# Patient Record
Sex: FEMALE | Race: WHITE | NOT HISPANIC OR LATINO | URBAN - METROPOLITAN AREA
[De-identification: names, ages, dates, MRNs, and addresses within clinical notes are randomized per-mention and may not be internally consistent; named-entity substitution may affect disease eponyms.]

---

## 2017-01-23 ENCOUNTER — EMERGENCY (EMERGENCY)
Facility: HOSPITAL | Age: 32
LOS: 1 days | Discharge: HOME | End: 2017-01-23
Attending: EMERGENCY MEDICINE | Admitting: EMERGENCY MEDICINE
Payer: COMMERCIAL

## 2017-01-23 VITALS
SYSTOLIC BLOOD PRESSURE: 115 MMHG | RESPIRATION RATE: 16 BRPM | HEART RATE: 115 BPM | OXYGEN SATURATION: 95 % | TEMPERATURE: 99 F | DIASTOLIC BLOOD PRESSURE: 74 MMHG

## 2017-01-23 VITALS
DIASTOLIC BLOOD PRESSURE: 76 MMHG | TEMPERATURE: 98 F | SYSTOLIC BLOOD PRESSURE: 109 MMHG | RESPIRATION RATE: 17 BRPM | HEART RATE: 102 BPM | OXYGEN SATURATION: 97 %

## 2017-01-23 DIAGNOSIS — Z91.013 ALLERGY TO SEAFOOD: ICD-10-CM

## 2017-01-23 DIAGNOSIS — R55 SYNCOPE AND COLLAPSE: ICD-10-CM

## 2017-01-23 DIAGNOSIS — Z98.890 OTHER SPECIFIED POSTPROCEDURAL STATES: ICD-10-CM

## 2017-01-23 DIAGNOSIS — Z98.89 OTHER SPECIFIED POSTPROCEDURAL STATES: Chronic | ICD-10-CM

## 2017-01-23 DIAGNOSIS — Z88.0 ALLERGY STATUS TO PENICILLIN: ICD-10-CM

## 2017-01-23 DIAGNOSIS — R19.7 DIARRHEA, UNSPECIFIED: ICD-10-CM

## 2017-01-23 DIAGNOSIS — R10.32 LEFT LOWER QUADRANT PAIN: ICD-10-CM

## 2017-01-23 DIAGNOSIS — R11.0 NAUSEA: ICD-10-CM

## 2017-01-23 LAB
ALBUMIN SERPL ELPH-MCNC: 4.4 G/DL — SIGNIFICANT CHANGE UP (ref 3.3–5)
ALP SERPL-CCNC: 72 U/L — SIGNIFICANT CHANGE UP (ref 40–120)
ALT FLD-CCNC: 19 U/L RC — SIGNIFICANT CHANGE UP (ref 10–45)
ANION GAP SERPL CALC-SCNC: 15 MMOL/L — SIGNIFICANT CHANGE UP (ref 5–17)
AST SERPL-CCNC: 21 U/L — SIGNIFICANT CHANGE UP (ref 10–40)
BASOPHILS # BLD AUTO: 0 K/UL — SIGNIFICANT CHANGE UP (ref 0–0.2)
BASOPHILS NFR BLD AUTO: 0.1 % — SIGNIFICANT CHANGE UP (ref 0–2)
BILIRUB SERPL-MCNC: 1.8 MG/DL — HIGH (ref 0.2–1.2)
BUN SERPL-MCNC: 12 MG/DL — SIGNIFICANT CHANGE UP (ref 7–23)
CALCIUM SERPL-MCNC: 9 MG/DL — SIGNIFICANT CHANGE UP (ref 8.4–10.5)
CHLORIDE SERPL-SCNC: 102 MMOL/L — SIGNIFICANT CHANGE UP (ref 96–108)
CO2 SERPL-SCNC: 22 MMOL/L — SIGNIFICANT CHANGE UP (ref 22–31)
CREAT SERPL-MCNC: 0.67 MG/DL — SIGNIFICANT CHANGE UP (ref 0.5–1.3)
EOSINOPHIL # BLD AUTO: 0 K/UL — SIGNIFICANT CHANGE UP (ref 0–0.5)
EOSINOPHIL NFR BLD AUTO: 0.2 % — SIGNIFICANT CHANGE UP (ref 0–6)
GLUCOSE SERPL-MCNC: 117 MG/DL — HIGH (ref 70–99)
HCT VFR BLD CALC: 43 % — SIGNIFICANT CHANGE UP (ref 34.5–45)
HGB BLD-MCNC: 15.1 G/DL — SIGNIFICANT CHANGE UP (ref 11.5–15.5)
LYMPHOCYTES # BLD AUTO: 0.3 K/UL — LOW (ref 1–3.3)
LYMPHOCYTES # BLD AUTO: 3 % — LOW (ref 13–44)
MAGNESIUM SERPL-MCNC: 1.7 MG/DL — SIGNIFICANT CHANGE UP (ref 1.6–2.6)
MCHC RBC-ENTMCNC: 34.1 PG — HIGH (ref 27–34)
MCHC RBC-ENTMCNC: 35 GM/DL — SIGNIFICANT CHANGE UP (ref 32–36)
MCV RBC AUTO: 97.2 FL — SIGNIFICANT CHANGE UP (ref 80–100)
MONOCYTES # BLD AUTO: 0.2 K/UL — SIGNIFICANT CHANGE UP (ref 0–0.9)
MONOCYTES NFR BLD AUTO: 2 % — SIGNIFICANT CHANGE UP (ref 2–14)
NEUTROPHILS # BLD AUTO: 9.7 K/UL — HIGH (ref 1.8–7.4)
NEUTROPHILS NFR BLD AUTO: 94.7 % — HIGH (ref 43–77)
PLAT MORPH BLD: NORMAL — SIGNIFICANT CHANGE UP
PLATELET # BLD AUTO: 163 K/UL — SIGNIFICANT CHANGE UP (ref 150–400)
POTASSIUM SERPL-MCNC: 4.1 MMOL/L — SIGNIFICANT CHANGE UP (ref 3.5–5.3)
POTASSIUM SERPL-SCNC: 4.1 MMOL/L — SIGNIFICANT CHANGE UP (ref 3.5–5.3)
PROT SERPL-MCNC: 7.4 G/DL — SIGNIFICANT CHANGE UP (ref 6–8.3)
RBC # BLD: 4.43 M/UL — SIGNIFICANT CHANGE UP (ref 3.8–5.2)
RBC # FLD: 10.8 % — SIGNIFICANT CHANGE UP (ref 10.3–14.5)
RBC BLD AUTO: SIGNIFICANT CHANGE UP
SODIUM SERPL-SCNC: 139 MMOL/L — SIGNIFICANT CHANGE UP (ref 135–145)
WBC # BLD: 10.2 K/UL — SIGNIFICANT CHANGE UP (ref 3.8–10.5)
WBC # FLD AUTO: 10.2 K/UL — SIGNIFICANT CHANGE UP (ref 3.8–10.5)

## 2017-01-23 PROCEDURE — 99284 EMERGENCY DEPT VISIT MOD MDM: CPT | Mod: 25

## 2017-01-23 PROCEDURE — 80053 COMPREHEN METABOLIC PANEL: CPT

## 2017-01-23 PROCEDURE — 85027 COMPLETE CBC AUTOMATED: CPT

## 2017-01-23 PROCEDURE — 96374 THER/PROPH/DIAG INJ IV PUSH: CPT

## 2017-01-23 PROCEDURE — 93010 ELECTROCARDIOGRAM REPORT: CPT

## 2017-01-23 PROCEDURE — 83735 ASSAY OF MAGNESIUM: CPT

## 2017-01-23 PROCEDURE — 93005 ELECTROCARDIOGRAM TRACING: CPT

## 2017-01-23 RX ORDER — KETOROLAC TROMETHAMINE 30 MG/ML
30 SYRINGE (ML) INJECTION ONCE
Qty: 0 | Refills: 0 | Status: DISCONTINUED | OUTPATIENT
Start: 2017-01-23 | End: 2017-01-23

## 2017-01-23 RX ADMIN — Medication 30 MILLIGRAM(S): at 15:01

## 2017-01-23 RX ADMIN — Medication 30 MILLIGRAM(S): at 15:31

## 2017-01-23 NOTE — ED PROVIDER NOTE - PROGRESS NOTE DETAILS
Study Investigator note: Dereck Strickland MD.  This patient has been evaluated for an investigational randomized placebo controlled study investigating extended release from of ondansetron.  Past  medical history unremarkable. No history of abdominal obstruction. The patient met all inclusion/exclusion criteria for the [facility] ondansetron clinical trial. The study evaluates the use of an ondansetron tablet which provides blood levels for up to 24 hours. The goal of the study is to determine if this tablet can prevent the need for IV hydration and decrease relapse after leaving the ED. If the patient leaves the ED, the patient receives 3 additional tablets to take every 24 hours. The physical exam was unremarkable.  The patients was asked to participate in the trial and the study was explained, including the placebo arm and potential side effects of ondansetron, 60% receive the active drug and 40% receive placebo.  If the patient is unable to tolerate oral rehydration or remains uncomfortable, an IV may be placed and the trial ended and metoclopramide would be given.   The patient was given the consent and opportunity to read and ask questions. Consent was obtained. Dereck Strickland MD (attending) No further vomiting in ED, soft abdomen, patient requesting to leave sooner than planned due to care of 18 month old and on coming storm.  Patient tolerated at least 1500 cc at this time. Informational study packet and medications given to patient Dereck Strickland MD (attending)

## 2017-01-23 NOTE — ED PROVIDER NOTE - MEDICAL DECISION MAKING DETAILS
After careful review of history, physical, and supporting data, there is very low suspicion for an acute abdomen.  The differential includes appendicitis, torsion, perforated viscus, ischemic bowel, obstruction, and infarct. Dereck Strickland MD (attending)

## 2017-01-23 NOTE — ED PROVIDER NOTE - INTERPRETATION
normal sinus rhythm, Normal axis, Normal DC interval and QRS complex. There are no acute ischemic ST or T-wave changes.

## 2017-01-23 NOTE — ED PROVIDER NOTE - ENMT NEGATIVE STATEMENT, MLM
.Nose: no nasal congestion and no nasal drainage.Mouth/Throat: no dysphagia, no hoarseness and no throat pain.Neck: no lumps, no pain, no stiffness and no swollen glands.

## 2017-01-23 NOTE — ED PROVIDER NOTE - PSYCHIATRIC, MLM
Alert and oriented to person, place, time/situation. normal mood and affect. no apparent risk to self or others. Tearful, crying

## 2017-01-23 NOTE — ED ADULT NURSE REASSESSMENT NOTE - NS ED NURSE REASSESS COMMENT FT1
Patient was brought to CDU from Triage. Patient in ED to receive investigational medicine. IV inserted. Toradol 30 mg IV given for pain.

## 2017-08-10 ENCOUNTER — APPOINTMENT (OUTPATIENT)
Dept: FAMILY MEDICINE | Facility: CLINIC | Age: 32
End: 2017-08-10
Payer: COMMERCIAL

## 2017-08-10 VITALS
BODY MASS INDEX: 27.29 KG/M2 | WEIGHT: 139 LBS | RESPIRATION RATE: 14 BRPM | HEART RATE: 73 BPM | TEMPERATURE: 98.2 F | DIASTOLIC BLOOD PRESSURE: 60 MMHG | HEIGHT: 60 IN | SYSTOLIC BLOOD PRESSURE: 100 MMHG | OXYGEN SATURATION: 98 %

## 2017-08-10 PROCEDURE — 99385 PREV VISIT NEW AGE 18-39: CPT

## 2017-08-11 ENCOUNTER — TRANSCRIPTION ENCOUNTER (OUTPATIENT)
Age: 32
End: 2017-08-11

## 2017-10-09 ENCOUNTER — APPOINTMENT (OUTPATIENT)
Dept: FAMILY MEDICINE | Facility: CLINIC | Age: 32
End: 2017-10-09
Payer: COMMERCIAL

## 2017-10-09 VITALS — TEMPERATURE: 98.6 F

## 2017-10-09 PROCEDURE — G0008: CPT

## 2017-10-09 PROCEDURE — 90688 IIV4 VACCINE SPLT 0.5 ML IM: CPT

## 2017-11-24 ENCOUNTER — TRANSCRIPTION ENCOUNTER (OUTPATIENT)
Age: 32
End: 2017-11-24

## 2017-12-06 ENCOUNTER — EMERGENCY (EMERGENCY)
Facility: HOSPITAL | Age: 32
LOS: 1 days | Discharge: HOME | End: 2017-12-06
Attending: EMERGENCY MEDICINE | Admitting: EMERGENCY MEDICINE
Payer: COMMERCIAL

## 2017-12-06 VITALS
OXYGEN SATURATION: 99 % | TEMPERATURE: 98 F | DIASTOLIC BLOOD PRESSURE: 63 MMHG | RESPIRATION RATE: 20 BRPM | HEART RATE: 114 BPM | SYSTOLIC BLOOD PRESSURE: 112 MMHG

## 2017-12-06 VITALS
DIASTOLIC BLOOD PRESSURE: 72 MMHG | SYSTOLIC BLOOD PRESSURE: 107 MMHG | OXYGEN SATURATION: 97 % | HEART RATE: 95 BPM | RESPIRATION RATE: 18 BRPM

## 2017-12-06 DIAGNOSIS — Z98.89 OTHER SPECIFIED POSTPROCEDURAL STATES: Chronic | ICD-10-CM

## 2017-12-06 LAB
ALBUMIN SERPL ELPH-MCNC: 4 G/DL — SIGNIFICANT CHANGE UP (ref 3.3–5)
ALP SERPL-CCNC: 72 U/L — SIGNIFICANT CHANGE UP (ref 40–120)
ALT FLD-CCNC: 19 U/L RC — SIGNIFICANT CHANGE UP (ref 10–45)
ANION GAP SERPL CALC-SCNC: 12 MMOL/L — SIGNIFICANT CHANGE UP (ref 5–17)
APPEARANCE UR: CLEAR — SIGNIFICANT CHANGE UP
AST SERPL-CCNC: 23 U/L — SIGNIFICANT CHANGE UP (ref 10–40)
BACTERIA # UR AUTO: ABNORMAL /HPF
BASOPHILS # BLD AUTO: 0 K/UL — SIGNIFICANT CHANGE UP (ref 0–0.2)
BASOPHILS NFR BLD AUTO: 0.1 % — SIGNIFICANT CHANGE UP (ref 0–2)
BILIRUB SERPL-MCNC: 0.8 MG/DL — SIGNIFICANT CHANGE UP (ref 0.2–1.2)
BILIRUB UR-MCNC: NEGATIVE — SIGNIFICANT CHANGE UP
BUN SERPL-MCNC: 10 MG/DL — SIGNIFICANT CHANGE UP (ref 7–23)
CALCIUM SERPL-MCNC: 9.3 MG/DL — SIGNIFICANT CHANGE UP (ref 8.4–10.5)
CHLORIDE SERPL-SCNC: 102 MMOL/L — SIGNIFICANT CHANGE UP (ref 96–108)
CO2 SERPL-SCNC: 26 MMOL/L — SIGNIFICANT CHANGE UP (ref 22–31)
COLOR SPEC: COLORLESS — SIGNIFICANT CHANGE UP
CREAT SERPL-MCNC: 0.69 MG/DL — SIGNIFICANT CHANGE UP (ref 0.5–1.3)
DIFF PNL FLD: ABNORMAL
EOSINOPHIL # BLD AUTO: 0 K/UL — SIGNIFICANT CHANGE UP (ref 0–0.5)
EOSINOPHIL NFR BLD AUTO: 0.3 % — SIGNIFICANT CHANGE UP (ref 0–6)
EPI CELLS # UR: SIGNIFICANT CHANGE UP /HPF
GLUCOSE SERPL-MCNC: 106 MG/DL — HIGH (ref 70–99)
GLUCOSE UR QL: NEGATIVE — SIGNIFICANT CHANGE UP
HCT VFR BLD CALC: 40.9 % — SIGNIFICANT CHANGE UP (ref 34.5–45)
HGB BLD-MCNC: 14.6 G/DL — SIGNIFICANT CHANGE UP (ref 11.5–15.5)
KETONES UR-MCNC: NEGATIVE — SIGNIFICANT CHANGE UP
LEUKOCYTE ESTERASE UR-ACNC: NEGATIVE — SIGNIFICANT CHANGE UP
LYMPHOCYTES # BLD AUTO: 0.7 K/UL — LOW (ref 1–3.3)
LYMPHOCYTES # BLD AUTO: 4.9 % — LOW (ref 13–44)
MCHC RBC-ENTMCNC: 35.2 PG — HIGH (ref 27–34)
MCHC RBC-ENTMCNC: 35.6 GM/DL — SIGNIFICANT CHANGE UP (ref 32–36)
MCV RBC AUTO: 99 FL — SIGNIFICANT CHANGE UP (ref 80–100)
MONOCYTES # BLD AUTO: 0.7 K/UL — SIGNIFICANT CHANGE UP (ref 0–0.9)
MONOCYTES NFR BLD AUTO: 4.9 % — SIGNIFICANT CHANGE UP (ref 2–14)
NEUTROPHILS # BLD AUTO: 12.2 K/UL — HIGH (ref 1.8–7.4)
NEUTROPHILS NFR BLD AUTO: 89.8 % — HIGH (ref 43–77)
NITRITE UR-MCNC: NEGATIVE — SIGNIFICANT CHANGE UP
PH UR: 6.5 — SIGNIFICANT CHANGE UP (ref 5–8)
PLATELET # BLD AUTO: 232 K/UL — SIGNIFICANT CHANGE UP (ref 150–400)
POTASSIUM SERPL-MCNC: 4.1 MMOL/L — SIGNIFICANT CHANGE UP (ref 3.5–5.3)
POTASSIUM SERPL-SCNC: 4.1 MMOL/L — SIGNIFICANT CHANGE UP (ref 3.5–5.3)
PROT SERPL-MCNC: 7.3 G/DL — SIGNIFICANT CHANGE UP (ref 6–8.3)
PROT UR-MCNC: NEGATIVE — SIGNIFICANT CHANGE UP
RBC # BLD: 4.13 M/UL — SIGNIFICANT CHANGE UP (ref 3.8–5.2)
RBC # FLD: 10.8 % — SIGNIFICANT CHANGE UP (ref 10.3–14.5)
SODIUM SERPL-SCNC: 140 MMOL/L — SIGNIFICANT CHANGE UP (ref 135–145)
SP GR SPEC: 1.01 — LOW (ref 1.01–1.02)
UROBILINOGEN FLD QL: NEGATIVE — SIGNIFICANT CHANGE UP
WBC # BLD: 13.5 K/UL — HIGH (ref 3.8–10.5)
WBC # FLD AUTO: 13.5 K/UL — HIGH (ref 3.8–10.5)
WBC UR QL: SIGNIFICANT CHANGE UP /HPF (ref 0–5)

## 2017-12-06 PROCEDURE — 99284 EMERGENCY DEPT VISIT MOD MDM: CPT | Mod: 25

## 2017-12-06 PROCEDURE — 99285 EMERGENCY DEPT VISIT HI MDM: CPT | Mod: 25

## 2017-12-06 PROCEDURE — 80053 COMPREHEN METABOLIC PANEL: CPT

## 2017-12-06 PROCEDURE — 76705 ECHO EXAM OF ABDOMEN: CPT | Mod: 26

## 2017-12-06 PROCEDURE — 81001 URINALYSIS AUTO W/SCOPE: CPT

## 2017-12-06 PROCEDURE — 96374 THER/PROPH/DIAG INJ IV PUSH: CPT

## 2017-12-06 PROCEDURE — 76705 ECHO EXAM OF ABDOMEN: CPT

## 2017-12-06 PROCEDURE — 85027 COMPLETE CBC AUTOMATED: CPT

## 2017-12-06 RX ORDER — ONDANSETRON 8 MG/1
1 TABLET, FILM COATED ORAL
Qty: 6 | Refills: 0 | OUTPATIENT
Start: 2017-12-06 | End: 2017-12-08

## 2017-12-06 RX ORDER — ONDANSETRON 8 MG/1
4 TABLET, FILM COATED ORAL ONCE
Qty: 0 | Refills: 0 | Status: COMPLETED | OUTPATIENT
Start: 2017-12-06 | End: 2017-12-06

## 2017-12-06 RX ADMIN — ONDANSETRON 4 MILLIGRAM(S): 8 TABLET, FILM COATED ORAL at 02:38

## 2017-12-06 NOTE — ED PROVIDER NOTE - MEDICAL DECISION MAKING DETAILS
Pt is a 32 yoF with no significant PMH who presents to ED with complaint of new nausea and vomiting, abd discomfort.   Diff diagnosis: gastroenteritis vs food poisoning  - check labs  - zofran  appears euvolemic Pt is a 32 yoF with no significant PMH who presents to ED with complaint of new nausea and vomiting, abd discomfort.   Diff diagnosis: gastroenteritis vs food poisoning vs appendicitis   - check labs - zofran for nausea. appears euvolemic currently   - appendicitis appears less likely given mild tenderness on exam and nontoxic appearance Pt is a 32 yoF with no significant PMH who presents to ED with complaint of new nausea and vomiting, abd discomfort. - check labs - zofran for nausea. appears euvolemic currently   - appendicitis appears less likely given mild tenderness on exam and nontoxic appearance  Attending Quique: 31 y/o female presenting with abdominal discomfort associated with nausea. on exam abd soft. low risk for appendicitis. no free fluid in the pelvis on pocus making ruptured cyst less likely. pt well appearing. initially tachycardic, which resolved without intervention. urine pregnancy negative making ectopic less likely. pt well appearing. close return precautions given. no vaginal discharge or risk factors for std. plan to d/c

## 2017-12-06 NOTE — ED PROCEDURE NOTE - PROCEDURE ADDITIONAL DETAILS
POCUS: Emergency Department Focused Ultrasound performed at patient's bedside.  The complete report will be available in PACS. no evidence of acute choelcystitis. no signficant free fluid in the pelvis

## 2017-12-06 NOTE — ED PROVIDER NOTE - PHYSICAL EXAMINATION
Gen: NAD, A&O x 3, lying in bed conversant  HEENT: PERRL, EOMI, mucous membranes moist, no oropharyngeal exudates  Neck:  supple no lymphadenopathy  Pulmonary: CTAB, no rhonci, wheezes or rales  Cardiac: regular rate and rhythm, normal S1S2, no r/m/g  GI:  soft, nondistended, mild tenderness to palpation  Extremities: warm, well perfused, no peripheral edema  Skin: skin intact, no skin tears or rashes or other lesions  Neuro: A&O x3, moves extremities voluntarily Gen: NAD, A&O x 3, lying in bed conversant  HEENT: PERRL, EOMI, mucous membranes moist, no oropharyngeal exudates  Neck:  supple no lymphadenopathy  Pulmonary: CTAB, no rhonci, wheezes or rales  Cardiac: regular rate and rhythm, normal S1S2, no r/m/g  GI:  soft, nondistended, mild tenderness to palpation  Extremities: warm, well perfused, no peripheral edema  Skin: skin intact, no skin tears or rashes or other lesions  Neuro: A&O x3, moves extremities voluntarily  Attending Lei: Gen: NAD, heent: atrauamtic, eomi, perrla, mmm, op pink, uvula midline, neck; nttp, no nuchal rigidity, chest: nttp, no crepitus, cv: rrr, no murmurs, lungs: ctab, abd: soft, nontender, nondistended, no peritoneal signs, +BS, no guarding, ext: wwp, neg homans, skin: no rash, neuro: awake and alert, following commands, speech clear, sensation and strength intact, no focal deficits

## 2017-12-06 NOTE — ED PROVIDER NOTE - NS ED ROS FT
ROS  Gen:  no fevers,  no chills, + lightheadedness   HEENT: no hearing loss, no pharygnitis, no oral lesions + rhinorrhea  Pulm: no cough, no SOB, no sputum production, no wheezing  Cardiac: no chest pain, no SOB, no orthopnea  GI: + abdominal pain, + N, + V, no diarrhea, no constipation  : no dysuria, no hematuria, no decreased urine output  Skin: no skin lesions  Neuro:  no headache, no focal weakness, no numbness, no syncope

## 2017-12-06 NOTE — ED PROVIDER NOTE - OBJECTIVE STATEMENT
Pt is a 32 yoF with no significant PMH who presents to ED with complaint of new nausea and vomiting, abd discomfort.  Pt was previously at normal state of health this evening. Woke up at midnight with rigors, abdominal pain, nausea. Had normal, nonbloody bowel movement at home, but endorses persistent nausea and nonbloody nonbilious emesis.  Endorses recent rhinorrhea, lightheadedness. Daughter had been ill recently.  Denies recent travel.  Consumed Japanese food earlier this evening, no new foods.

## 2017-12-06 NOTE — ED PROVIDER NOTE - CARE PLAN
Instructions for follow-up, activity and diet:	You were seen in the emergency department for nausea, vomiting and abdominal pain. You had blood work, results are included here.  Please continue to stay hydrated.  If you experience fevers, chills, recurrence of abdominal pain, nausea, vomiting or diarrhea, please notify your primary care doctor and return to the emergency department. Principal Discharge DX:	Abdominal pain  Instructions for follow-up, activity and diet:	You were seen in the emergency department for nausea, vomiting and abdominal pain. You had blood work, results are included here.  Please continue to stay hydrated.  If you experience fevers, chills, recurrence of abdominal pain, nausea, vomiting or diarrhea, please notify your primary care doctor and return to the emergency department.

## 2017-12-06 NOTE — ED PROVIDER NOTE - PROGRESS NOTE DETAILS
Attending Quique: on repeat exam abd soft. d/w pt close return precautions for worsening pain. RLQ discomfort or vomiting. pt comfortable with plan and willl return for worsening

## 2017-12-06 NOTE — ED PROVIDER NOTE - PLAN OF CARE
You were seen in the emergency department for nausea, vomiting and abdominal pain. You had blood work, results are included here.  Please continue to stay hydrated.  If you experience fevers, chills, recurrence of abdominal pain, nausea, vomiting or diarrhea, please notify your primary care doctor and return to the emergency department.

## 2017-12-06 NOTE — ED ADULT NURSE NOTE - OBJECTIVE STATEMENT
woke up from sleeping w chills and nausea w 1 episode of vomiting, denies any blood in the vomiting  c/o LLQ abd pain and discomfort ongoing since last february.  and pressure and cramping in the epigastric, w lightheadedness   denies any chest pain, no shortness of breath

## 2018-05-25 ENCOUNTER — APPOINTMENT (OUTPATIENT)
Dept: FAMILY MEDICINE | Facility: CLINIC | Age: 33
End: 2018-05-25
Payer: COMMERCIAL

## 2018-05-25 VITALS — SYSTOLIC BLOOD PRESSURE: 120 MMHG | TEMPERATURE: 98.2 F | DIASTOLIC BLOOD PRESSURE: 70 MMHG

## 2018-05-25 DIAGNOSIS — J06.9 ACUTE UPPER RESPIRATORY INFECTION, UNSPECIFIED: ICD-10-CM

## 2018-05-25 PROCEDURE — 99213 OFFICE O/P EST LOW 20 MIN: CPT

## 2018-05-25 NOTE — PHYSICAL EXAM
[No Acute Distress] : no acute distress [Normal Oropharynx] : the oropharynx was normal [Supple] : supple [Regular Rhythm] : with a regular rhythm [Normal Anterior Cervical Nodes] : no anterior cervical lymphadenopathy [de-identified] : mild wheeze

## 2018-05-25 NOTE — ASSESSMENT
[FreeTextEntry1] : Pt presents to office for 3 day hx getting worse of sore throat , cough ,congestion and wheezing. Greenish mucus and coughing a lot giving her HA>  No fever. Got worse this early am with HA and cough and  wheezing.  Has asthma triggered by URI. Has pro air inhaler uses infrequently\par \par Pt will start Mucinex-D and Tlenol\par If no improvement in 3-4 days can start Zpack\par Renew Proair

## 2018-05-25 NOTE — HISTORY OF PRESENT ILLNESS
[FreeTextEntry8] : Pt presents to office for 3 day hx getting worse of sore throat , cough ,congestion and wheezing. Greenish mucus and coughing a lot giving her HA>  No fever. Got worse this early am with HA and cough and  wheezing.  Has asthma triggered by URI. Has pro air inhaler uses infrequently

## 2018-05-25 NOTE — REVIEW OF SYSTEMS
[Fatigue] : fatigue [Nasal Discharge] : nasal discharge [Sore Throat] : sore throat [Postnasal Drip] : postnasal drip [Wheezing] : wheezing [Cough] : cough [Negative] : Heme/Lymph

## 2018-06-21 ENCOUNTER — APPOINTMENT (OUTPATIENT)
Dept: FAMILY MEDICINE | Facility: CLINIC | Age: 33
End: 2018-06-21

## 2018-06-22 ENCOUNTER — APPOINTMENT (OUTPATIENT)
Dept: FAMILY MEDICINE | Facility: CLINIC | Age: 33
End: 2018-06-22
Payer: COMMERCIAL

## 2018-06-22 VITALS
DIASTOLIC BLOOD PRESSURE: 74 MMHG | OXYGEN SATURATION: 99 % | HEART RATE: 84 BPM | SYSTOLIC BLOOD PRESSURE: 100 MMHG | TEMPERATURE: 98.8 F | RESPIRATION RATE: 16 BRPM

## 2018-06-22 PROCEDURE — 99213 OFFICE O/P EST LOW 20 MIN: CPT

## 2018-06-22 RX ORDER — AZITHROMYCIN DIHYDRATE 250 MG/1
250 TABLET, FILM COATED ORAL
Qty: 1 | Refills: 0 | Status: DISCONTINUED | COMMUNITY
Start: 2018-05-25 | End: 2018-06-22

## 2018-06-22 RX ORDER — TOBRAMYCIN AND DEXAMETHASONE 3; 1 MG/ML; MG/ML
0.3-0.1 SUSPENSION/ DROPS OPHTHALMIC
Qty: 5 | Refills: 0 | Status: DISCONTINUED | COMMUNITY
Start: 2017-12-21

## 2018-06-22 RX ORDER — CLINDAMYCIN PHOSPHATE 1 G/10ML
1 GEL TOPICAL
Qty: 60 | Refills: 0 | Status: DISCONTINUED | COMMUNITY
Start: 2017-10-23

## 2018-06-22 RX ORDER — NYSTATIN 100000 U/G
100000 OINTMENT TOPICAL
Qty: 15 | Refills: 0 | Status: DISCONTINUED | COMMUNITY
Start: 2018-04-23

## 2018-06-22 RX ORDER — TRIAMCINOLONE ACETONIDE 1 MG/G
0.1 CREAM TOPICAL
Qty: 15 | Refills: 0 | Status: DISCONTINUED | COMMUNITY
Start: 2018-04-23

## 2018-06-28 ENCOUNTER — APPOINTMENT (OUTPATIENT)
Dept: CARDIOLOGY | Facility: CLINIC | Age: 33
End: 2018-06-28
Payer: COMMERCIAL

## 2018-06-28 VITALS
SYSTOLIC BLOOD PRESSURE: 120 MMHG | WEIGHT: 138 LBS | BODY MASS INDEX: 27.09 KG/M2 | HEIGHT: 60 IN | HEART RATE: 76 BPM | DIASTOLIC BLOOD PRESSURE: 68 MMHG

## 2018-06-28 PROCEDURE — 93000 ELECTROCARDIOGRAM COMPLETE: CPT

## 2018-06-28 PROCEDURE — 99204 OFFICE O/P NEW MOD 45 MIN: CPT

## 2018-07-23 ENCOUNTER — APPOINTMENT (OUTPATIENT)
Dept: FAMILY MEDICINE | Facility: CLINIC | Age: 33
End: 2018-07-23
Payer: COMMERCIAL

## 2018-07-23 VITALS
TEMPERATURE: 98.6 F | HEART RATE: 75 BPM | RESPIRATION RATE: 16 BRPM | DIASTOLIC BLOOD PRESSURE: 62 MMHG | BODY MASS INDEX: 27.09 KG/M2 | HEIGHT: 60 IN | SYSTOLIC BLOOD PRESSURE: 98 MMHG | WEIGHT: 138 LBS | OXYGEN SATURATION: 98 %

## 2018-07-23 PROCEDURE — 99395 PREV VISIT EST AGE 18-39: CPT

## 2018-07-26 ENCOUNTER — APPOINTMENT (OUTPATIENT)
Dept: CARDIOLOGY | Facility: CLINIC | Age: 33
End: 2018-07-26
Payer: COMMERCIAL

## 2018-07-26 PROCEDURE — 93306 TTE W/DOPPLER COMPLETE: CPT

## 2018-07-30 ENCOUNTER — NON-APPOINTMENT (OUTPATIENT)
Age: 33
End: 2018-07-30

## 2018-08-09 ENCOUNTER — APPOINTMENT (OUTPATIENT)
Dept: CARDIOLOGY | Facility: CLINIC | Age: 33
End: 2018-08-09
Payer: COMMERCIAL

## 2018-08-09 VITALS
SYSTOLIC BLOOD PRESSURE: 115 MMHG | HEART RATE: 92 BPM | WEIGHT: 138 LBS | DIASTOLIC BLOOD PRESSURE: 74 MMHG | BODY MASS INDEX: 26.95 KG/M2 | OXYGEN SATURATION: 93 %

## 2018-08-09 PROCEDURE — 93306 TTE W/DOPPLER COMPLETE: CPT

## 2018-08-09 PROCEDURE — 99215 OFFICE O/P EST HI 40 MIN: CPT

## 2018-08-16 ENCOUNTER — APPOINTMENT (OUTPATIENT)
Dept: MRI IMAGING | Facility: CLINIC | Age: 33
End: 2018-08-16
Payer: COMMERCIAL

## 2018-08-16 ENCOUNTER — OUTPATIENT (OUTPATIENT)
Dept: OUTPATIENT SERVICES | Facility: HOSPITAL | Age: 33
LOS: 1 days | End: 2018-08-16
Payer: COMMERCIAL

## 2018-08-16 DIAGNOSIS — Z98.89 OTHER SPECIFIED POSTPROCEDURAL STATES: Chronic | ICD-10-CM

## 2018-08-16 DIAGNOSIS — Z00.8 ENCOUNTER FOR OTHER GENERAL EXAMINATION: ICD-10-CM

## 2018-08-16 PROCEDURE — A9585: CPT

## 2018-08-16 PROCEDURE — 75561 CARDIAC MRI FOR MORPH W/DYE: CPT | Mod: 26

## 2018-08-16 PROCEDURE — 75561 CARDIAC MRI FOR MORPH W/DYE: CPT

## 2018-08-17 ENCOUNTER — NON-APPOINTMENT (OUTPATIENT)
Age: 33
End: 2018-08-17

## 2018-08-23 ENCOUNTER — NON-APPOINTMENT (OUTPATIENT)
Age: 33
End: 2018-08-23

## 2018-08-23 ENCOUNTER — APPOINTMENT (OUTPATIENT)
Dept: ELECTROPHYSIOLOGY | Facility: CLINIC | Age: 33
End: 2018-08-23
Payer: COMMERCIAL

## 2018-08-23 VITALS — DIASTOLIC BLOOD PRESSURE: 71 MMHG | OXYGEN SATURATION: 98 % | HEART RATE: 72 BPM | SYSTOLIC BLOOD PRESSURE: 103 MMHG

## 2018-08-23 PROCEDURE — 93000 ELECTROCARDIOGRAM COMPLETE: CPT

## 2018-08-23 PROCEDURE — 99205 OFFICE O/P NEW HI 60 MIN: CPT

## 2018-09-17 ENCOUNTER — APPOINTMENT (OUTPATIENT)
Dept: FAMILY MEDICINE | Facility: CLINIC | Age: 33
End: 2018-09-17
Payer: COMMERCIAL

## 2018-09-17 VITALS — TEMPERATURE: 98.3 F | DIASTOLIC BLOOD PRESSURE: 72 MMHG | SYSTOLIC BLOOD PRESSURE: 102 MMHG

## 2018-09-17 PROCEDURE — 99213 OFFICE O/P EST LOW 20 MIN: CPT | Mod: 25

## 2018-09-17 RX ADMIN — TRIAMCINOLONE ACETONIDE 0.5 MG/ML: 40 INJECTION, SUSPENSION INTRA-ARTICULAR; INTRAMUSCULAR at 00:00

## 2018-09-18 RX ORDER — TRIAMCINOLONE ACETONIDE 40 MG/ML
40 SUSPENSION INTRA-ARTERIAL; INTRAMUSCULAR
Qty: 1 | Refills: 0 | Status: COMPLETED | OUTPATIENT
Start: 2018-09-17

## 2018-10-14 ENCOUNTER — TRANSCRIPTION ENCOUNTER (OUTPATIENT)
Age: 33
End: 2018-10-14

## 2018-10-24 ENCOUNTER — APPOINTMENT (OUTPATIENT)
Dept: FAMILY MEDICINE | Facility: CLINIC | Age: 33
End: 2018-10-24
Payer: COMMERCIAL

## 2018-10-24 VITALS — TEMPERATURE: 98.5 F | SYSTOLIC BLOOD PRESSURE: 128 MMHG | DIASTOLIC BLOOD PRESSURE: 80 MMHG

## 2018-10-24 DIAGNOSIS — Z87.09 PERSONAL HISTORY OF OTHER DISEASES OF THE RESPIRATORY SYSTEM: ICD-10-CM

## 2018-10-24 LAB — S PYO AG SPEC QL IA: NEGATIVE

## 2018-10-24 PROCEDURE — 99213 OFFICE O/P EST LOW 20 MIN: CPT | Mod: 25

## 2018-10-24 PROCEDURE — 87880 STREP A ASSAY W/OPTIC: CPT | Mod: QW

## 2018-10-29 LAB — BACTERIA THROAT CULT: NORMAL

## 2018-12-17 ENCOUNTER — TRANSCRIPTION ENCOUNTER (OUTPATIENT)
Age: 33
End: 2018-12-17

## 2018-12-22 ENCOUNTER — APPOINTMENT (OUTPATIENT)
Dept: ORTHOPEDIC SURGERY | Facility: CLINIC | Age: 33
End: 2018-12-22
Payer: COMMERCIAL

## 2018-12-22 VITALS
HEART RATE: 84 BPM | SYSTOLIC BLOOD PRESSURE: 114 MMHG | HEIGHT: 60 IN | DIASTOLIC BLOOD PRESSURE: 77 MMHG | WEIGHT: 144 LBS | BODY MASS INDEX: 28.27 KG/M2

## 2018-12-22 DIAGNOSIS — Z78.9 OTHER SPECIFIED HEALTH STATUS: ICD-10-CM

## 2018-12-22 PROCEDURE — 99202 OFFICE O/P NEW SF 15 MIN: CPT

## 2019-01-02 ENCOUNTER — APPOINTMENT (OUTPATIENT)
Dept: FAMILY MEDICINE | Facility: CLINIC | Age: 34
End: 2019-01-02
Payer: COMMERCIAL

## 2019-01-02 VITALS — SYSTOLIC BLOOD PRESSURE: 110 MMHG | TEMPERATURE: 98 F | DIASTOLIC BLOOD PRESSURE: 64 MMHG

## 2019-01-02 PROCEDURE — 99213 OFFICE O/P EST LOW 20 MIN: CPT

## 2019-01-23 ENCOUNTER — APPOINTMENT (OUTPATIENT)
Dept: ORTHOPEDIC SURGERY | Facility: CLINIC | Age: 34
End: 2019-01-23

## 2019-02-17 ENCOUNTER — TRANSCRIPTION ENCOUNTER (OUTPATIENT)
Age: 34
End: 2019-02-17

## 2019-02-22 ENCOUNTER — APPOINTMENT (OUTPATIENT)
Dept: PAIN MANAGEMENT | Facility: CLINIC | Age: 34
End: 2019-02-22
Payer: COMMERCIAL

## 2019-02-22 VITALS
HEIGHT: 60 IN | HEART RATE: 68 BPM | DIASTOLIC BLOOD PRESSURE: 77 MMHG | WEIGHT: 141 LBS | BODY MASS INDEX: 27.68 KG/M2 | SYSTOLIC BLOOD PRESSURE: 115 MMHG

## 2019-02-22 PROCEDURE — 99204 OFFICE O/P NEW MOD 45 MIN: CPT

## 2019-02-22 NOTE — HISTORY OF PRESENT ILLNESS
[FreeTextEntry1] : Pt notes that her first migraine was 2007 following college where she was nauseated, vomiting, likely hypotensive and then had LOC.  Has tendency to vomit and then will feel better  however, as this progressed the headaches were less frequent and further apart but not as easily responsible.  Did start to increase again following birth of her daughter.  In last 7 months has variability in her frequency.\par She did start to develop other associated features in last 6 months including icepick events with head turning.\par Now her pressure runs on the low side of normal and drop in bp very rapidly will lead her to pass out.\par On New Year's Renea she had an event where she had a severe migraine where she fainted, fell and hit her head on left.  Was examined 1/2 by primary and recommended to see a neurologist.  (see Dr. Leonard's note from 1/2)  \par She generally uses excedrin successfully.  However, despite her known triggers \par She does note some pulsatile tinnitus in her left ear, some recent visual changes in correction.\par Has had cardiac monitoring for episode of palpitation and noted to have "triplet" but no other cardiac symptoms.\par \par \par  [Headache] : headache [Aura] : aura [Nausea] : nausea [Photophobia] : photophobia [Phonophobia] : phonophobia [Scalp Tenderness] : scalp tenderness [___ Times Per Month] : [unfilled] times per month [> 4 hours] : > 4 hours [stayed the same] : stayed the same [4] : a minimum pain level of 4/10 [9] : a maximum pain level of 9/10 [Stable] : The patient reports ~his/her~ symptoms since the last visit are stable

## 2019-02-22 NOTE — PHYSICAL EXAM
[General Appearance - Alert] : alert [General Appearance - In No Acute Distress] : in no acute distress [General Appearance - Well Nourished] : well nourished [General Appearance - Well Developed] : well developed [General Appearance - Well-Appearing] : healthy appearing [Oriented To Time, Place, And Person] : oriented to person, place, and time [Impaired Insight] : insight and judgment were intact [Affect] : the affect was normal [Mood] : the mood was normal [Memory Recent] : recent memory was not impaired [Memory Remote] : remote memory was not impaired [Person] : oriented to person [Place] : oriented to place [Time] : oriented to time [Short Term Intact] : short term memory intact [Remote Intact] : remote memory intact [Registration Intact] : recent registration memory intact [Concentration Intact] : normal concentrating ability [Visual Intact] : visual attention was ~T not ~L decreased [Naming Objects] : no difficulty naming common objects [Writing A Sentence] : no difficulty writing a sentence [Fluency] : fluency intact [Comprehension] : comprehension intact [Reading] : reading intact [Current Events] : adequate knowledge of current events [Past History] : adequate knowledge of personal past history [Vocabulary] : adequate range of vocabulary [Cranial Nerves Oculomotor (III)] : extraocular motion intact [Cranial Nerves Trigeminal (V)] : facial sensation intact symmetrically [Cranial Nerves Facial (VII)] : face symmetrical [Cranial Nerves Vestibulocochlear (VIII)] : hearing was intact bilaterally [Cranial Nerves Accessory (XI - Cranial And Spinal)] : head turning and shoulder shrug symmetric [Cranial Nerves Hypoglossal (XII)] : there was no tongue deviation with protrusion [Motor Strength] : muscle strength was normal in all four extremities [No Muscle Atrophy] : normal bulk in all four extremities [Motor Handedness Right-Handed] : the patient is right hand dominant [Motor Strength Upper Extremities Bilaterally] : strength was normal in both upper extremities [Motor Strength Lower Extremities Bilaterally] : strength was normal in both lower extremities [Sensation Tactile Decrease] : light touch was intact [Allodynia] : no ~T allodynia present [Balance] : balance was intact [Limited Balance] : balance was intact [Past-pointing] : there was no past-pointing [Tremor] : no tremor present [Dysdiadochokinesia Bilaterally] : not present [Coordination - Dysmetria Impaired Finger-to-Nose Bilateral] : not present [2+] : Patella left 2+ [Sclera] : the sclera and conjunctiva were normal [PERRL With Normal Accommodation] : pupils were equal in size, round, reactive to light, with normal accommodation [Extraocular Movements] : extraocular movements were intact [Outer Ear] : the ears and nose were normal in appearance [FreeTextEntry1] : moderate decrease rom in both paraspinals [Exaggerated Use Of Accessory Muscles For Inspiration] : no accessory muscle use [Edema] : there was no peripheral edema [Abdomen Tenderness] : non-tender [No CVA Tenderness] : no ~M costovertebral angle tenderness [No Spinal Tenderness] : no spinal tenderness [Abnormal Walk] : normal gait [Nail Clubbing] : no clubbing  or cyanosis of the fingernails [Involuntary Movements] : no involuntary movements were seen [Musculoskeletal - Swelling] : no joint swelling seen [Motor Tone] : muscle strength and tone were normal [Skin Color & Pigmentation] : normal skin color and pigmentation [Skin Turgor] : normal skin turgor [] : no rash

## 2019-02-22 NOTE — REVIEW OF SYSTEMS
[Fever] : no fever [Chills] : no chills [Feeling Poorly] : feeling poorly [Feeling Tired] : feeling tired [Eyesight Problems] : no eyesight problems [Loss Of Hearing] : no hearing loss [Chest Pain] : no chest pain [Palpitations] : no palpitations [Cough] : no cough [Constipation] : no constipation [Diarrhea] : no diarrhea [Arthralgias] : arthralgias [Neck Pain] : neck pain [Skin Lesions] : no skin lesions [Skin Wound] : no skin wound [Itching] : no itching [As Noted in HPI] : as noted in HPI [Dizziness] : dizziness [Fainting] : fainting [Sleep Disturbances] : sleep disturbances [Muscle Weakness] : no muscle weakness [Swollen Glands] : no swollen glands [Swollen Glands In The Neck] : no swollen glands in the neck

## 2019-02-28 ENCOUNTER — APPOINTMENT (OUTPATIENT)
Dept: ELECTROPHYSIOLOGY | Facility: CLINIC | Age: 34
End: 2019-02-28
Payer: COMMERCIAL

## 2019-02-28 ENCOUNTER — NON-APPOINTMENT (OUTPATIENT)
Age: 34
End: 2019-02-28

## 2019-02-28 ENCOUNTER — MESSAGE (OUTPATIENT)
Age: 34
End: 2019-02-28

## 2019-02-28 VITALS
BODY MASS INDEX: 27.88 KG/M2 | OXYGEN SATURATION: 99 % | HEART RATE: 69 BPM | DIASTOLIC BLOOD PRESSURE: 71 MMHG | SYSTOLIC BLOOD PRESSURE: 110 MMHG | HEIGHT: 60 IN | WEIGHT: 142 LBS

## 2019-02-28 PROCEDURE — 99213 OFFICE O/P EST LOW 20 MIN: CPT

## 2019-02-28 PROCEDURE — 93000 ELECTROCARDIOGRAM COMPLETE: CPT

## 2019-02-28 RX ORDER — BENZONATATE 200 MG/1
200 CAPSULE ORAL 3 TIMES DAILY
Qty: 30 | Refills: 0 | Status: DISCONTINUED | COMMUNITY
Start: 2018-10-24 | End: 2019-02-28

## 2019-02-28 RX ORDER — ONDANSETRON 4 MG/1
4 TABLET, ORALLY DISINTEGRATING ORAL
Qty: 30 | Refills: 2 | Status: DISCONTINUED | COMMUNITY
Start: 2019-02-22 | End: 2019-02-28

## 2019-02-28 NOTE — HISTORY OF PRESENT ILLNESS
[FreeTextEntry1] : No further episodes.  She has rare palpitations that over the past few weeks/months have withered away, ie happen less often, less intense, and shorter.  She is eating and hydrating better, although she has coffee with her this AM.

## 2019-02-28 NOTE — PHYSICAL EXAM
[General Appearance - Well Developed] : well developed [Normal Appearance] : normal appearance [Well Groomed] : well groomed [General Appearance - Well Nourished] : well nourished [No Deformities] : no deformities [General Appearance - In No Acute Distress] : no acute distress [Normal Conjunctiva] : the conjunctiva exhibited no abnormalities [Eyelids - No Xanthelasma] : the eyelids demonstrated no xanthelasmas [Normal Oral Mucosa] : normal oral mucosa [No Oral Pallor] : no oral pallor [No Oral Cyanosis] : no oral cyanosis [Normal Jugular Venous A Waves Present] : normal jugular venous A waves present [Normal Jugular Venous V Waves Present] : normal jugular venous V waves present [No Jugular Venous Fragoso A Waves] : no jugular venous fragoso A waves [Respiration, Rhythm And Depth] : normal respiratory rhythm and effort [Exaggerated Use Of Accessory Muscles For Inspiration] : no accessory muscle use [Auscultation Breath Sounds / Voice Sounds] : lungs were clear to auscultation bilaterally [Heart Rate And Rhythm] : heart rate and rhythm were normal [Heart Sounds] : normal S1 and S2 [Murmurs] : no murmurs present [Abdomen Soft] : soft [Abdomen Tenderness] : non-tender [Abdomen Mass (___ Cm)] : no abdominal mass palpated [Abnormal Walk] : normal gait [Gait - Sufficient For Exercise Testing] : the gait was sufficient for exercise testing [Nail Clubbing] : no clubbing of the fingernails [Cyanosis, Localized] : no localized cyanosis [Petechial Hemorrhages (___cm)] : no petechial hemorrhages [Skin Color & Pigmentation] : normal skin color and pigmentation [] : no rash [No Venous Stasis] : no venous stasis [Skin Lesions] : no skin lesions [No Skin Ulcers] : no skin ulcer [No Xanthoma] : no  xanthoma was observed

## 2019-03-01 ENCOUNTER — TRANSCRIPTION ENCOUNTER (OUTPATIENT)
Age: 34
End: 2019-03-01

## 2019-03-05 ENCOUNTER — MESSAGE (OUTPATIENT)
Age: 34
End: 2019-03-05

## 2019-03-06 ENCOUNTER — APPOINTMENT (OUTPATIENT)
Dept: NEUROSURGERY | Facility: CLINIC | Age: 34
End: 2019-03-06
Payer: COMMERCIAL

## 2019-03-06 PROCEDURE — 99204 OFFICE O/P NEW MOD 45 MIN: CPT

## 2019-03-07 ENCOUNTER — APPOINTMENT (OUTPATIENT)
Dept: SURGERY | Facility: CLINIC | Age: 34
End: 2019-03-07

## 2019-03-07 ENCOUNTER — FORM ENCOUNTER (OUTPATIENT)
Age: 34
End: 2019-03-07

## 2019-03-08 ENCOUNTER — APPOINTMENT (OUTPATIENT)
Dept: MRI IMAGING | Facility: IMAGING CENTER | Age: 34
End: 2019-03-08
Payer: COMMERCIAL

## 2019-03-08 ENCOUNTER — OUTPATIENT (OUTPATIENT)
Dept: OUTPATIENT SERVICES | Facility: HOSPITAL | Age: 34
LOS: 1 days | End: 2019-03-08
Payer: COMMERCIAL

## 2019-03-08 ENCOUNTER — APPOINTMENT (OUTPATIENT)
Dept: PAIN MANAGEMENT | Facility: CLINIC | Age: 34
End: 2019-03-08

## 2019-03-08 ENCOUNTER — TRANSCRIPTION ENCOUNTER (OUTPATIENT)
Age: 34
End: 2019-03-08

## 2019-03-08 VITALS
DIASTOLIC BLOOD PRESSURE: 74 MMHG | RESPIRATION RATE: 12 BRPM | OXYGEN SATURATION: 100 % | HEART RATE: 74 BPM | TEMPERATURE: 98 F | SYSTOLIC BLOOD PRESSURE: 109 MMHG | HEIGHT: 60 IN | WEIGHT: 136.03 LBS

## 2019-03-08 DIAGNOSIS — Z98.89 OTHER SPECIFIED POSTPROCEDURAL STATES: Chronic | ICD-10-CM

## 2019-03-08 DIAGNOSIS — D32.9 BENIGN NEOPLASM OF MENINGES, UNSPECIFIED: ICD-10-CM

## 2019-03-08 DIAGNOSIS — R00.2 PALPITATIONS: ICD-10-CM

## 2019-03-08 DIAGNOSIS — Z29.9 ENCOUNTER FOR PROPHYLACTIC MEASURES, UNSPECIFIED: ICD-10-CM

## 2019-03-08 DIAGNOSIS — F41.9 ANXIETY DISORDER, UNSPECIFIED: ICD-10-CM

## 2019-03-08 DIAGNOSIS — Z01.818 ENCOUNTER FOR OTHER PREPROCEDURAL EXAMINATION: ICD-10-CM

## 2019-03-08 LAB
ANION GAP SERPL CALC-SCNC: 20 MMOL/L — HIGH (ref 5–17)
BLD GP AB SCN SERPL QL: NEGATIVE — SIGNIFICANT CHANGE UP
BUN SERPL-MCNC: 12 MG/DL — SIGNIFICANT CHANGE UP (ref 7–23)
CALCIUM SERPL-MCNC: 10 MG/DL — SIGNIFICANT CHANGE UP (ref 8.4–10.5)
CHLORIDE SERPL-SCNC: 101 MMOL/L — SIGNIFICANT CHANGE UP (ref 96–108)
CO2 SERPL-SCNC: 20 MMOL/L — LOW (ref 22–31)
CREAT SERPL-MCNC: 0.63 MG/DL — SIGNIFICANT CHANGE UP (ref 0.5–1.3)
GLUCOSE SERPL-MCNC: 117 MG/DL — HIGH (ref 70–99)
HCG SERPL-ACNC: <2 MIU/ML — SIGNIFICANT CHANGE UP
HCT VFR BLD CALC: 42.6 % — SIGNIFICANT CHANGE UP (ref 34.5–45)
HGB BLD-MCNC: 14.3 G/DL — SIGNIFICANT CHANGE UP (ref 11.5–15.5)
MCHC RBC-ENTMCNC: 33.1 PG — SIGNIFICANT CHANGE UP (ref 27–34)
MCHC RBC-ENTMCNC: 33.6 GM/DL — SIGNIFICANT CHANGE UP (ref 32–36)
MCV RBC AUTO: 98.6 FL — SIGNIFICANT CHANGE UP (ref 80–100)
MRSA PCR RESULT.: SIGNIFICANT CHANGE UP
PLATELET # BLD AUTO: 220 K/UL — SIGNIFICANT CHANGE UP (ref 150–400)
POTASSIUM SERPL-MCNC: 3.8 MMOL/L — SIGNIFICANT CHANGE UP (ref 3.5–5.3)
POTASSIUM SERPL-SCNC: 3.8 MMOL/L — SIGNIFICANT CHANGE UP (ref 3.5–5.3)
RBC # BLD: 4.32 M/UL — SIGNIFICANT CHANGE UP (ref 3.8–5.2)
RBC # FLD: 11.8 % — SIGNIFICANT CHANGE UP (ref 10.3–14.5)
RH IG SCN BLD-IMP: POSITIVE — SIGNIFICANT CHANGE UP
S AUREUS DNA NOSE QL NAA+PROBE: DETECTED
SODIUM SERPL-SCNC: 141 MMOL/L — SIGNIFICANT CHANGE UP (ref 135–145)
WBC # BLD: 8.84 K/UL — SIGNIFICANT CHANGE UP (ref 3.8–10.5)
WBC # FLD AUTO: 8.84 K/UL — SIGNIFICANT CHANGE UP (ref 3.8–10.5)

## 2019-03-08 PROCEDURE — 86850 RBC ANTIBODY SCREEN: CPT

## 2019-03-08 PROCEDURE — 80048 BASIC METABOLIC PNL TOTAL CA: CPT

## 2019-03-08 PROCEDURE — 70553 MRI BRAIN STEM W/O & W/DYE: CPT | Mod: 26

## 2019-03-08 PROCEDURE — 85027 COMPLETE CBC AUTOMATED: CPT

## 2019-03-08 PROCEDURE — 87640 STAPH A DNA AMP PROBE: CPT

## 2019-03-08 PROCEDURE — 70553 MRI BRAIN STEM W/O & W/DYE: CPT

## 2019-03-08 PROCEDURE — A9585: CPT

## 2019-03-08 PROCEDURE — 87641 MR-STAPH DNA AMP PROBE: CPT

## 2019-03-08 PROCEDURE — G0463: CPT

## 2019-03-08 PROCEDURE — 84702 CHORIONIC GONADOTROPIN TEST: CPT

## 2019-03-08 PROCEDURE — 86901 BLOOD TYPING SEROLOGIC RH(D): CPT

## 2019-03-08 PROCEDURE — 86900 BLOOD TYPING SEROLOGIC ABO: CPT

## 2019-03-08 RX ORDER — VANCOMYCIN HCL 1 G
1000 VIAL (EA) INTRAVENOUS ONCE
Qty: 0 | Refills: 0 | Status: DISCONTINUED | OUTPATIENT
Start: 2019-03-09 | End: 2019-03-11

## 2019-03-08 RX ORDER — LIDOCAINE HCL 20 MG/ML
0.2 VIAL (ML) INJECTION ONCE
Qty: 0 | Refills: 0 | Status: DISCONTINUED | OUTPATIENT
Start: 2019-03-09 | End: 2019-03-09

## 2019-03-08 RX ORDER — SODIUM CHLORIDE 9 MG/ML
3 INJECTION INTRAMUSCULAR; INTRAVENOUS; SUBCUTANEOUS EVERY 8 HOURS
Qty: 0 | Refills: 0 | Status: DISCONTINUED | OUTPATIENT
Start: 2019-03-09 | End: 2019-03-09

## 2019-03-08 NOTE — H&P PST ADULT - ASSESSMENT
CAPRINI SCORE [CLOT]    AGE RELATED RISK FACTORS                                                       MOBILITY RELATED FACTORS  [ ] Age 41-60 years                                            (1 Point)                  [ ] Bed rest                                                        (1 Point)  [ ] Age: 61-74 years                                           (2 Points)                 [ ] Plaster cast                                                   (2 Points)  [ ] Age= 75 years                                              (3 Points)                 [ ] Bed bound for more than 72 hours                 (2 Points)    DISEASE RELATED RISK FACTORS                                               GENDER SPECIFIC FACTORS  [ ] Edema in the lower extremities                       (1 Point)                  [ ] Pregnancy                                                     (1 Point)  [ ] Varicose veins                                               (1 Point)                  [ ] Post-partum < 6 weeks                                   (1 Point)             [x ] BMI > 25 Kg/m2                                            (1 Point)                  [ ] Hormonal therapy  or oral contraception          (1 Point)                 [ ] Sepsis (in the previous month)                        (1 Point)                  [ ] History of pregnancy complications                 (1 point)  [ ] Pneumonia or serious lung disease                                               [ ] Unexplained or recurrent                     (1 Point)           (in the previous month)                               (1 Point)  [ ] Abnormal pulmonary function test                     (1 Point)                 SURGERY RELATED RISK FACTORS  [ ] Acute myocardial infarction                              (1 Point)                 [ ]  Section                                             (1 Point)  [ ] Congestive heart failure (in the previous month)  (1 Point)               [ ] Minor surgery                                                  (1 Point)   [ ] Inflammatory bowel disease                             (1 Point)                 [ ] Arthroscopic surgery                                        (2 Points)  [ ] Central venous access                                      (2 Points)                [x ] General surgery lasting more than 45 minutes   (2 Points)       [ ] Stroke (in the previous month)                          (5 Points)               [ ] Elective arthroplasty                                         (5 Points)                                                                                                                                               HEMATOLOGY RELATED FACTORS                                                 TRAUMA RELATED RISK FACTORS  [ ] Prior episodes of VTE                                     (3 Points)                 [ ] Fracture of the hip, pelvis, or leg                       (5 Points)  [ ] Positive family history for VTE                         (3 Points)                 [ ] Acute spinal cord injury (in the previous month)  (5 Points)  [ ] Prothrombin 89748 A                                     (3 Points)                 [ ] Paralysis  (less than 1 month)                             (5 Points)  [ ] Factor V Leiden                                             (3 Points)                  [ ] Multiple Trauma within 1 month                        (5 Points)  [ ] Lupus anticoagulants                                     (3 Points)                                                           [ ] Anticardiolipin antibodies                               (3 Points)                                                       [ ] High homocysteine in the blood                      (3 Points)                                             [ ] Other congenital or acquired thrombophilia      (3 Points)                                                [ ] Heparin induced thrombocytopenia                  (3 Points)                                          Total Score [    3      ]

## 2019-03-08 NOTE — H&P PST ADULT - ATTENDING COMMENTS
66 year old male POD 3 s/p flex bronch, robotic assisted left VATS, left upper lobe volume reduction, mechanical pleurodesis  -Posterior chest tube removed, follow up x-ray  -Pain control  -OOB and ambulate  -Plan to discharge today vs. 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 Her headache pattern has changed recently and she had an episode of vomiting 3 days ago without associated migraine. Plan: right posterior fossa craniotomy and ventriculostomy placement. I have discussed the risks, benefits, and alternatives to surgery with the patient and answered all questions.

## 2019-03-08 NOTE — H&P PST ADULT - NSANTHOSAYNRD_GEN_A_CORE
No. TESS screening performed.  STOP BANG Legend: 0-2 = LOW Risk; 3-4 = INTERMEDIATE Risk; 5-8 = HIGH Risk

## 2019-03-08 NOTE — H&P PST ADULT - PROBLEM SELECTOR PLAN 2
Instructed to take Xanax as needed including DOS with small sip of water.  She is requesting some medication before being brought into OR due to her high anxiety.

## 2019-03-08 NOTE — H&P PST ADULT - HISTORY OF PRESENT ILLNESS
Mrs. Schaefer is a 33 year old woman with PMH Migraines and anxiety who has been having increasing headaches with vomiting and her neurologist Dr. James ordered an MRI/MRV and diagnosed with meningioma which she is now scheduled for left posterior fossa craniotomy for tumor removal 3/9/19.  Dr. Garner started her on dexamethasone 4mg every 8 hours until surgery and due to increasing anxiety about diagnosis and planned surgery started her on Alprazolam 0.25mg.  She has no c/o headache today, however is extremely anxious about the surgery tomorrow.

## 2019-03-08 NOTE — H&P PST ADULT - PROBLEM SELECTOR PLAN 1
Scheduled for left posterior fossa craniotomy for tumor.  Preop instructions given.  Labs pending including pregnancy test, nasal swab for MRSA

## 2019-03-09 ENCOUNTER — APPOINTMENT (OUTPATIENT)
Dept: NEUROSURGERY | Facility: HOSPITAL | Age: 34
End: 2019-03-09

## 2019-03-09 ENCOUNTER — INPATIENT (INPATIENT)
Facility: HOSPITAL | Age: 34
LOS: 4 days | Discharge: HOME | DRG: 25 | End: 2019-03-14
Attending: NEUROLOGICAL SURGERY | Admitting: NEUROLOGICAL SURGERY
Payer: COMMERCIAL

## 2019-03-09 VITALS
DIASTOLIC BLOOD PRESSURE: 74 MMHG | RESPIRATION RATE: 18 BRPM | WEIGHT: 136.03 LBS | HEART RATE: 76 BPM | OXYGEN SATURATION: 99 % | TEMPERATURE: 98 F | HEIGHT: 60 IN | SYSTOLIC BLOOD PRESSURE: 118 MMHG

## 2019-03-09 DIAGNOSIS — Z98.89 OTHER SPECIFIED POSTPROCEDURAL STATES: Chronic | ICD-10-CM

## 2019-03-09 DIAGNOSIS — D32.9 BENIGN NEOPLASM OF MENINGES, UNSPECIFIED: ICD-10-CM

## 2019-03-09 LAB
GAS PNL BLDA: SIGNIFICANT CHANGE UP
HCG UR QL: NEGATIVE — SIGNIFICANT CHANGE UP

## 2019-03-09 PROCEDURE — 61519 REMOVE BRAIN LINING LESION: CPT | Mod: 22

## 2019-03-09 PROCEDURE — 61120 BURR HOLE FOR VENTR PUNCTURE: CPT

## 2019-03-09 PROCEDURE — 61781 SCAN PROC CRANIAL INTRA: CPT

## 2019-03-09 PROCEDURE — 99291 CRITICAL CARE FIRST HOUR: CPT

## 2019-03-09 RX ORDER — PANTOPRAZOLE SODIUM 20 MG/1
40 TABLET, DELAYED RELEASE ORAL DAILY
Qty: 0 | Refills: 0 | Status: DISCONTINUED | OUTPATIENT
Start: 2019-03-09 | End: 2019-03-11

## 2019-03-09 RX ORDER — HYDROMORPHONE HYDROCHLORIDE 2 MG/ML
0.5 INJECTION INTRAMUSCULAR; INTRAVENOUS; SUBCUTANEOUS
Qty: 0 | Refills: 0 | Status: DISCONTINUED | OUTPATIENT
Start: 2019-03-09 | End: 2019-03-09

## 2019-03-09 RX ORDER — DOCUSATE SODIUM 100 MG
100 CAPSULE ORAL THREE TIMES A DAY
Qty: 0 | Refills: 0 | Status: DISCONTINUED | OUTPATIENT
Start: 2019-03-09 | End: 2019-03-14

## 2019-03-09 RX ORDER — OXYCODONE AND ACETAMINOPHEN 5; 325 MG/1; MG/1
1 TABLET ORAL EVERY 4 HOURS
Qty: 0 | Refills: 0 | Status: DISCONTINUED | OUTPATIENT
Start: 2019-03-09 | End: 2019-03-14

## 2019-03-09 RX ORDER — OXYCODONE AND ACETAMINOPHEN 5; 325 MG/1; MG/1
2 TABLET ORAL EVERY 6 HOURS
Qty: 0 | Refills: 0 | Status: DISCONTINUED | OUTPATIENT
Start: 2019-03-09 | End: 2019-03-14

## 2019-03-09 RX ORDER — METOCLOPRAMIDE HCL 10 MG
10 TABLET ORAL ONCE
Qty: 0 | Refills: 0 | Status: COMPLETED | OUTPATIENT
Start: 2019-03-09 | End: 2019-03-09

## 2019-03-09 RX ORDER — NAFCILLIN 10 G/100ML
1 INJECTION, POWDER, FOR SOLUTION INTRAVENOUS EVERY 4 HOURS
Qty: 0 | Refills: 0 | Status: DISCONTINUED | OUTPATIENT
Start: 2019-03-09 | End: 2019-03-09

## 2019-03-09 RX ORDER — MORPHINE SULFATE 50 MG/1
1 CAPSULE, EXTENDED RELEASE ORAL EVERY 4 HOURS
Qty: 0 | Refills: 0 | Status: DISCONTINUED | OUTPATIENT
Start: 2019-03-09 | End: 2019-03-09

## 2019-03-09 RX ORDER — VANCOMYCIN HCL 1 G
1000 VIAL (EA) INTRAVENOUS DAILY
Qty: 0 | Refills: 0 | Status: COMPLETED | OUTPATIENT
Start: 2019-03-09 | End: 2019-03-10

## 2019-03-09 RX ORDER — ALPRAZOLAM 0.25 MG
0.25 TABLET ORAL EVERY 6 HOURS
Qty: 0 | Refills: 0 | Status: DISCONTINUED | OUTPATIENT
Start: 2019-03-09 | End: 2019-03-10

## 2019-03-09 RX ORDER — DEXAMETHASONE 0.5 MG/5ML
4 ELIXIR ORAL EVERY 6 HOURS
Qty: 0 | Refills: 0 | Status: DISCONTINUED | OUTPATIENT
Start: 2019-03-09 | End: 2019-03-11

## 2019-03-09 RX ORDER — DEXTROSE MONOHYDRATE, SODIUM CHLORIDE, AND POTASSIUM CHLORIDE 50; .745; 4.5 G/1000ML; G/1000ML; G/1000ML
1000 INJECTION, SOLUTION INTRAVENOUS
Qty: 0 | Refills: 0 | Status: DISCONTINUED | OUTPATIENT
Start: 2019-03-09 | End: 2019-03-10

## 2019-03-09 RX ORDER — ONDANSETRON 8 MG/1
4 TABLET, FILM COATED ORAL ONCE
Qty: 0 | Refills: 0 | Status: COMPLETED | OUTPATIENT
Start: 2019-03-09 | End: 2019-03-09

## 2019-03-09 RX ADMIN — HYDROMORPHONE HYDROCHLORIDE 0.5 MILLIGRAM(S): 2 INJECTION INTRAMUSCULAR; INTRAVENOUS; SUBCUTANEOUS at 16:00

## 2019-03-09 RX ADMIN — HYDROMORPHONE HYDROCHLORIDE 0.5 MILLIGRAM(S): 2 INJECTION INTRAMUSCULAR; INTRAVENOUS; SUBCUTANEOUS at 16:25

## 2019-03-09 RX ADMIN — DEXTROSE MONOHYDRATE, SODIUM CHLORIDE, AND POTASSIUM CHLORIDE 85 MILLILITER(S): 50; .745; 4.5 INJECTION, SOLUTION INTRAVENOUS at 18:29

## 2019-03-09 RX ADMIN — Medication 250 MILLIGRAM(S): at 18:18

## 2019-03-09 RX ADMIN — Medication 10 MILLIGRAM(S): at 17:14

## 2019-03-09 RX ADMIN — HYDROMORPHONE HYDROCHLORIDE 0.5 MILLIGRAM(S): 2 INJECTION INTRAMUSCULAR; INTRAVENOUS; SUBCUTANEOUS at 16:50

## 2019-03-09 RX ADMIN — Medication 4 MILLIGRAM(S): at 18:11

## 2019-03-09 RX ADMIN — HYDROMORPHONE HYDROCHLORIDE 0.5 MILLIGRAM(S): 2 INJECTION INTRAMUSCULAR; INTRAVENOUS; SUBCUTANEOUS at 15:30

## 2019-03-09 RX ADMIN — ONDANSETRON 4 MILLIGRAM(S): 8 TABLET, FILM COATED ORAL at 15:10

## 2019-03-09 RX ADMIN — HYDROMORPHONE HYDROCHLORIDE 0.5 MILLIGRAM(S): 2 INJECTION INTRAMUSCULAR; INTRAVENOUS; SUBCUTANEOUS at 18:25

## 2019-03-09 RX ADMIN — Medication 4 MILLIGRAM(S): at 23:00

## 2019-03-09 RX ADMIN — HYDROMORPHONE HYDROCHLORIDE 0.5 MILLIGRAM(S): 2 INJECTION INTRAMUSCULAR; INTRAVENOUS; SUBCUTANEOUS at 15:10

## 2019-03-09 RX ADMIN — HYDROMORPHONE HYDROCHLORIDE 0.5 MILLIGRAM(S): 2 INJECTION INTRAMUSCULAR; INTRAVENOUS; SUBCUTANEOUS at 18:11

## 2019-03-09 RX ADMIN — Medication 0.25 MILLIGRAM(S): at 17:15

## 2019-03-09 RX ADMIN — SODIUM CHLORIDE 3 MILLILITER(S): 9 INJECTION INTRAMUSCULAR; INTRAVENOUS; SUBCUTANEOUS at 06:37

## 2019-03-09 NOTE — PROGRESS NOTE ADULT - SUBJECTIVE AND OBJECTIVE BOX
34yo woman POD#0 L SOC for meningioma resection    Vitals/labs/meds/imaging reviewed  alert, fully oriented, PERRL, EOMI, FS, TML  BUE no drift 5/5, BLE 5/5    CTH in am  decadron for cerebral edema  EVD at [id number] per neurosurgery  pain control  -160  incentive spirometry  advance diet as tolerated  IVL  D/C rapp  monitor for fevers    critical care time 30min

## 2019-03-09 NOTE — PROGRESS NOTE ADULT - SUBJECTIVE AND OBJECTIVE BOX
Chief Complaint/Reason for Visit/HPI:    Reason for Admission:  Reason for Admission	I am having a meningioma removed from my brain     History of Present Illness:  History of Present Illness	  Mrs. Schaefer is a 33 year old woman with PMH Migraines and anxiety who has been having increasing headaches with vomiting and her neurologist Dr. James ordered an MRI/MRV and diagnosed with meningioma which she is now scheduled for left posterior fossa craniotomy for tumor removal 3/9/19.  Dr. Garner started her on dexamethasone 4mg every 8 hours until surgery and due to increasing anxiety about diagnosis and planned surgery started her on Alprazolam 0.25mg.  She has no c/o headache today, however is extremely anxious about the surgery tomorrow.    Allergies/Medications:   Allergies:        Allergies:  	dermabond: Miscellaneous, Hives, Blisters, dermabond  	penicillin: Drug, Unknown, Pt does not know reaction  	shellfish: Food, Unknown, shrimp/crab- pt unsure of reaction found on allergy testing    Home Medications:   * Patient Currently Takes Medications as of 08-Mar-2019 08:01 documented in Structured Notes  · 	Co Q-10 100 mg oral capsule: Last Dose Taken:  , 1 cap(s) orally once a day  · 	magnesium: Last Dose Taken:  , 400 tab(s) orally once a day  · 	ALPRAZolam 0.5 mg oral tablet: Last Dose Taken:  , 0.5 tab(s) orally 3 times a day, As Needed  · 	dexamethasone 4 mg oral tablet: Last Dose Taken:  , 1 tab(s) orally every 8 hours until surgery  · 	Colace 50 mg oral capsule: Last Dose Taken:  , 1 cap(s) orally 2 times a day  · 	famotidine 40 mg oral tablet: Last Dose Taken:  , 1 tab(s) orally once a day (at bedtime)    PMH/PSH/FH/SH:    Past Medical History:  Anxiety  Since dx with meningioma  Migraine without aura and without status migrainosus, not intractable    Mild intermittent asthma without complication  only when ill with cold  Palpitations  Work up done by Dr. Noel 2018  Syncope, unspecified syncope type  Only from intense vomiting from migraine, followed by Bert.     Past Surgical History:  H/O:   .     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  · Lives With	children; spouse     Substance Use History:  · Substance Use	caffeine  · Caffeine Type	coffee  · Caffeine Amount/Frequency	3-4 cups/cans per day  · Caffeine Withdrawal Pattern	na     Alcohol Use History:  · Alcohol Use Comment	once a week, one glass of wine or beer     Tobacco Usage:  · Tobacco Usage: Never smoker      SURGERY: Craniotomy          REVIEW OF SYSTEMS: [ ] Unable to Assess due to neurologic exam   [ ] All ROS addressed below are non-contributory, except:  Neuro: [ ] Headache [ ] Back pain [ ] Numbness [ ] Weakness [ ] Ataxia [ ] Dizziness [ ] Aphasia [ ] Dysarthria [ ] Visual disturbance  Resp: [ ] Shortness of breath/dyspnea, [ ] Orthopnea [ ] Cough  CV: [ ] Chest pain [ ] Palpitation [ ] Lightheadedness [ ] Syncope  Renal: [ ] Thirst [ ] Edema  GI: [ ] Nausea [ ] Emesis [ ] Abdominal pain [ ] Constipation [ ] Diarrhea  Hem: [ ] Hematemesis [ ] bright red blood per rectum  ID: [ ] Fever [ ] Chills [ ] Dysuria  ENT: [ ] Rhinorrhea          ICU Vital Signs Last 24 Hrs  T(C): 36.8 (09 Mar 2019 07:16), Max: 36.8 (09 Mar 2019 06:18)  T(F): 98.2 (09 Mar 2019 06:18), Max: 98.2 (09 Mar 2019 06:18)  HR: 76 (09 Mar 2019 07:16) (76 - 76)  BP: 118/74 (09 Mar 2019 07:16) (118/74 - 118/74)  BP(mean): --  ABP: --  ABP(mean): --  RR: 18 (09 Mar 2019 07:16) (18 - 18)  SpO2: 99% (09 Mar 2019 07:16) (99% - 99%)                            14.3   8.84  )-----------( 220      ( 08 Mar 2019 13:46 )             42.6    03-08    141  |  101  |  12  ----------------------------<  117<H>  3.8   |  20<L>  |  0.63    Ca    10.0      08 Mar 2019 09:58     ABG - ( 09 Mar 2019 10:38 )  pH, Arterial: 7.51  pH, Blood: x     /  pCO2: 28    /  pO2: 571   / HCO3: 22    / Base Excess: .7    /  SaO2: 100                      PHYSICAL EXAM:    General: No Acute Distress     Neurological: Awake, alert oriented to person, place and time, Following Commands, PERRL, EOMI, Face Symmetrical, Speech Fluent, Moving all extremities, Muscle Strength normal in all four extremities, No Drift, Sensation to Light Touch Intact    Pulmonary: Clear to Auscultation, No Rales, No Rhonchi, No Wheezes     Cardiovascular: S1, S2, Regular Rate and Rhythm     Gastrointestinal: Soft, Nontender, Nondistended     Extremities: No calf tenderness     Incision:       MEDICATIONS:  Antibiotics:   vancomycin  IVPB 1000 milliGRAM(s) IV Intermittent once     Neurological:     Cardiac:     Pulm:    Heme:     Other:        DEVICES: [] Restraints [] SHINE/HMV []LD [] ET tube [] Trach [] Chest Tube [] A-line [] Rapp [] NGT [] Rectal Tube       Impression: Homogeneous enhancing extra axial lesion involving the left   posterior fossa region is identified.    A/P:  Mrs. Schaefer is a 34 yo with left posterior fossa mass, most likely meningeoma, s/p craniotomy today     Neuro:  CT head tomorrow morning neuro checks q 1 hr, MRI brain wwo in few days, decadron for vasogenic edema   EVD at 10 cm water for mild hydrocephalus per NS   keep ICP< 20 mmhg, CPP> 60 mmhg   Respiratory: RA  CV:SBP goal 100-150 mmhg , a line   Endocrine: finger sticks q 6 hrs, ISS  Heme/Onc:   cbc, INR           DVT ppx: SCD, hold lovenox as she is POD0, will need dopplers as she is high risk for DVT given brain tumor   Renal: NS 75 ml/hr , rapp   ID: afebrile, mery-op ancef   GI: advance diet as tolerated   Social/Family: updated at bedside   Discharge planning: ICU     Code Status: [x] Full Code [] DNR [] DNI [] Goals of Care:   Disposition: [x] ICU [] Stroke Unit [] RCU []PCU []Floor [] Discharge Home     Patient at high risk for neurologic deterioration, critical care time, excluding procedures: 35 minutes Chief Complaint/Reason for Visit/HPI:    Reason for Admission:  Reason for Admission	I am having a meningioma removed from my brain     History of Present Illness:  History of Present Illness	  Mrs. Schaefer is a 33 year old woman with PMH Migraines and anxiety who has been having increasing headaches with vomiting and her neurologist Dr. James ordered an MRI/MRV and diagnosed with meningioma which she is now scheduled for left posterior fossa craniotomy for tumor removal 3/9/19.  Dr. Garner started her on dexamethasone 4mg every 8 hours until surgery and due to increasing anxiety about diagnosis and planned surgery started her on Alprazolam 0.25mg.  She has no c/o headache today, however is extremely anxious about the surgery tomorrow.    Allergies/Medications:   Allergies:        Allergies:  	dermabond: Miscellaneous, Hives, Blisters, dermabond  	penicillin: Drug, Unknown, Pt does not know reaction  	shellfish: Food, Unknown, shrimp/crab- pt unsure of reaction found on allergy testing    Home Medications:   * Patient Currently Takes Medications as of 08-Mar-2019 08:01 documented in Structured Notes  · 	Co Q-10 100 mg oral capsule: Last Dose Taken:  , 1 cap(s) orally once a day  · 	magnesium: Last Dose Taken:  , 400 tab(s) orally once a day  · 	ALPRAZolam 0.5 mg oral tablet: Last Dose Taken:  , 0.5 tab(s) orally 3 times a day, As Needed  · 	dexamethasone 4 mg oral tablet: Last Dose Taken:  , 1 tab(s) orally every 8 hours until surgery  · 	Colace 50 mg oral capsule: Last Dose Taken:  , 1 cap(s) orally 2 times a day  · 	famotidine 40 mg oral tablet: Last Dose Taken:  , 1 tab(s) orally once a day (at bedtime)    PMH/PSH/FH/SH:    Past Medical History:  Anxiety  Since dx with meningioma  Migraine without aura and without status migrainosus, not intractable    Mild intermittent asthma without complication  only when ill with cold  Palpitations  Work up done by Dr. Noel 2018  Syncope, unspecified syncope type  Only from intense vomiting from migraine, followed by Bert.     Past Surgical History:  H/O:   .     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  · Lives With	children; spouse     Substance Use History:  · Substance Use	caffeine  · Caffeine Type	coffee  · Caffeine Amount/Frequency	3-4 cups/cans per day  · Caffeine Withdrawal Pattern	na     Alcohol Use History:  · Alcohol Use Comment	once a week, one glass of wine or beer     Tobacco Usage:  · Tobacco Usage: Never smoker      SURGERY: Craniotomy          REVIEW OF SYSTEMS: [ ] Unable to Assess due to neurologic exam   [x ] All ROS addressed below are non-contributory, except:  Neuro: [x ] Headache [ ] Back pain [ ] Numbness [ ] Weakness [ ] Ataxia [ ] Dizziness [ ] Aphasia [ ] Dysarthria [ ] Visual disturbance anxiety   Resp: [ ] Shortness of breath/dyspnea, [ ] Orthopnea [ ] Cough  CV: [ ] Chest pain [ ] Palpitation [ ] Lightheadedness [ ] Syncope  Renal: [ ] Thirst [ ] Edema  GI: [ ] Nausea [ ] Emesis [ ] Abdominal pain [ ] Constipation [ ] Diarrhea  Hem: [ ] Hematemesis [ ] bright red blood per rectum  ID: [ ] Fever [ ] Chills [ ] Dysuria  ENT: [ ] Rhinorrhea          ICU Vital Signs Last 24 Hrs  T(C): 36.8 (09 Mar 2019 07:16), Max: 36.8 (09 Mar 2019 06:18)  T(F): 98.2 (09 Mar 2019 06:18), Max: 98.2 (09 Mar 2019 06:18)  HR: 76 (09 Mar 2019 07:16) (76 - 76)  BP: 118/74 (09 Mar 2019 07:16) (118/74 - 118/74)  BP(mean): --  ABP: --  ABP(mean): --  RR: 18 (09 Mar 2019 07:16) (18 - 18)  SpO2: 99% (09 Mar 2019 07:16) (99% - 99%)                            14.3   8.84  )-----------( 220      ( 08 Mar 2019 13:46 )             42.6    03-08    141  |  101  |  12  ----------------------------<  117<H>  3.8   |  20<L>  |  0.63    Ca    10.0      08 Mar 2019 09:58     ABG - ( 09 Mar 2019 10:38 )  pH, Arterial: 7.51  pH, Blood: x     /  pCO2: 28    /  pO2: 571   / HCO3: 22    / Base Excess: .7    /  SaO2: 100                      PHYSICAL EXAM:    General: No Acute Distress     Neurological: Awake, alert oriented to person, place and time, Following Commands, PERRL, EOMI, Face Symmetrical, Speech Fluent, Moving all extremities, Muscle Strength normal in all four extremities, No Drift, Sensation to Light Touch Intact    Pulmonary: Clear to Auscultation, No Rales, No Rhonchi, No Wheezes     Cardiovascular: S1, S2, Regular Rate and Rhythm     Gastrointestinal: Soft, Nontender, Nondistended     Extremities: No calf tenderness     Incision:       MEDICATIONS:  Antibiotics:   vancomycin  IVPB 1000 milliGRAM(s) IV Intermittent once     Neurological:     Cardiac:     Pulm:    Heme:     Other:        DEVICES: [] Restraints [] SHINE/HMV []LD [] ET tube [] Trach [] Chest Tube [] A-line [] Rapp [] NGT [] Rectal Tube       Impression: Homogeneous enhancing extra axial lesion involving the left   posterior fossa region is identified.    A/P:  Mrs. Schaefer is a 32 yo with left posterior fossa mass, most likely meningeoma, s/p craniotomy today     Neuro:  CT head tomorrow morning neuro checks q 1 hr, MRI brain wwo in few days, decadron for vasogenic edema   EVD at 10 cm water for mild hydrocephalus per NS   xanax for anxiety   keep ICP< 20 mmhg, CPP> 60 mmhg   Respiratory: RA  CV:SBP goal 100-150 mmhg , a line   Endocrine: finger sticks q 6 hrs, ISS  Heme/Onc:   cbc, INR           DVT ppx: SCD, hold lovenox as she is POD0, will need dopplers as she is high risk for DVT given brain tumor   Renal: NS 75 ml/hr , rapp   ID: afebrile, mery-op ancef   GI: advance diet as tolerated   Social/Family: updated at bedside   Discharge planning: ICU     Code Status: [x] Full Code [] DNR [] DNI [] Goals of Care:   Disposition: [x] ICU [] Stroke Unit [] RCU []PCU []Floor [] Discharge Home     Patient at high risk for neurologic deterioration, critical care time, excluding procedures: 35 minutes

## 2019-03-09 NOTE — CHART NOTE - NSCHARTNOTEFT_GEN_A_CORE
CAPRINI SCORE [CLOT] Score on Admission for     AGE RELATED RISK FACTORS                                                       MOBILITY RELATED FACTORS  [ ] Age 41-60 years                                            (1 Point)                  [ ] Bed rest                                                        (1 Point)  [ ] Age: 61-74 years                                           (2 Points)                 [ ] Plaster cast                                                   (2 Points)  [ ] Age= 75 years                                              (3 Points)                 [ ] Bed bound for more than 72 hours                 (2 Points)    DISEASE RELATED RISK FACTORS                                               GENDER SPECIFIC FACTORS  [ ] Edema in the lower extremities                       (1 Point)                  [ ] Pregnancy                                                     (1 Point)  [ ] Varicose veins                                               (1 Point)                  [ ] Post-partum < 6 weeks                                   (1 Point)             [x ] BMI > 25 Kg/m2                                            (1 Point)                  [ ] Hormonal therapy  or oral contraception          (1 Point)                 [ ] Sepsis (in the previous month)                        (1 Point)                  [ ] History of pregnancy complications                 (1 point)  [ ] Pneumonia or serious lung disease                                               [ ] Unexplained or recurrent                     (1 Point)           (in the previous month)                               (1 Point)  [ ] Abnormal pulmonary function test                     (1 Point)                 SURGERY RELATED RISK FACTORS (include planned surgeries)  [ ] Acute myocardial infarction                              (1 Point)                 [ ]  Section                                             (1 Point)  [ ] Congestive heart failure (in the previous month)  (1 Point)               [ ] Minor surgery                                                  (1 Point)   [ ] Inflammatory bowel disease                             (1 Point)                 [ ] Arthroscopic surgery                                        (2 Points)  [ ] Central venous access                                      (2 Points)                [x ] General surgery lasting more than 45 minutes   (2 Points)       [ ] Stroke (in the previous month)                          (5 Points)               [ ] Elective arthroplasty                                         (5 Points)            [ ] Current or past malignancy                                (2 Points)                                                                                                     HEMATOLOGY RELATED FACTORS                                                 TRAUMA RELATED RISK FACTORS  [ ] Prior episodes of VTE                                     (3 Points)                [ ] Fracture of the hip, pelvis, or leg                       (5 Points)  [ ] Positive family history for VTE                         (3 Points)                 [ ] Acute spinal cord injury (in the previous month)  (5 Points)  [ ] Prothrombin 19899 A                                     (3 Points)                 [ ] Paralysis  (less than 1 month)                             (5 Points)  [ ] Factor V Leiden                                             (3 Points)                  [ ] Multiple Trauma within 1 month                        (5 Points)  [ ] Lupus anticoagulants                                     (3 Points)                                                           [ ] Anticardiolipin antibodies                               (3 Points)                                                       [ ] High homocysteine in the blood                      (3 Points)                                             [ ] Other congenital or acquired thrombophilia      (3 Points)                                                [ ] Heparin induced thrombocytopenia                  (3 Points)                                          Total Score [   3       ]    Risk:  Very low 0   Low 1 to 2   Moderate 3 to 4   High =5       VTE Prophylasix Recommednations:  [x ] mechanical pneumatic compression devices                                      [ ] contraindicated: _____________________  [ ] chemo prophylasix                                                                                   [x ] contraindicated __Patient is day 0 from s/p lt SOC for tumor___________________    **** HIGH LIKELIHOOD DVT PRESENT ON ADMISSION  [ x] (please order LE dopplers within 24 hours of admission)

## 2019-03-10 ENCOUNTER — RESULT REVIEW (OUTPATIENT)
Age: 34
End: 2019-03-10

## 2019-03-10 LAB
ANION GAP SERPL CALC-SCNC: 14 MMOL/L — SIGNIFICANT CHANGE UP (ref 5–17)
ANION GAP SERPL CALC-SCNC: 15 MMOL/L — SIGNIFICANT CHANGE UP (ref 5–17)
BUN SERPL-MCNC: 7 MG/DL — SIGNIFICANT CHANGE UP (ref 7–23)
BUN SERPL-MCNC: 7 MG/DL — SIGNIFICANT CHANGE UP (ref 7–23)
CALCIUM SERPL-MCNC: 8.4 MG/DL — SIGNIFICANT CHANGE UP (ref 8.4–10.5)
CALCIUM SERPL-MCNC: 8.7 MG/DL — SIGNIFICANT CHANGE UP (ref 8.4–10.5)
CHLORIDE SERPL-SCNC: 101 MMOL/L — SIGNIFICANT CHANGE UP (ref 96–108)
CHLORIDE SERPL-SCNC: 104 MMOL/L — SIGNIFICANT CHANGE UP (ref 96–108)
CO2 SERPL-SCNC: 20 MMOL/L — LOW (ref 22–31)
CO2 SERPL-SCNC: 20 MMOL/L — LOW (ref 22–31)
CREAT SERPL-MCNC: 0.41 MG/DL — LOW (ref 0.5–1.3)
CREAT SERPL-MCNC: 0.52 MG/DL — SIGNIFICANT CHANGE UP (ref 0.5–1.3)
GLUCOSE SERPL-MCNC: 134 MG/DL — HIGH (ref 70–99)
GLUCOSE SERPL-MCNC: 158 MG/DL — HIGH (ref 70–99)
HCT VFR BLD CALC: 34.7 % — SIGNIFICANT CHANGE UP (ref 34.5–45)
HCT VFR BLD CALC: 36.4 % — SIGNIFICANT CHANGE UP (ref 34.5–45)
HGB BLD-MCNC: 12.4 G/DL — SIGNIFICANT CHANGE UP (ref 11.5–15.5)
HGB BLD-MCNC: 13.1 G/DL — SIGNIFICANT CHANGE UP (ref 11.5–15.5)
MAGNESIUM SERPL-MCNC: 2 MG/DL — SIGNIFICANT CHANGE UP (ref 1.6–2.6)
MAGNESIUM SERPL-MCNC: 2 MG/DL — SIGNIFICANT CHANGE UP (ref 1.6–2.6)
MCHC RBC-ENTMCNC: 34.5 PG — HIGH (ref 27–34)
MCHC RBC-ENTMCNC: 34.6 PG — HIGH (ref 27–34)
MCHC RBC-ENTMCNC: 35.8 GM/DL — SIGNIFICANT CHANGE UP (ref 32–36)
MCHC RBC-ENTMCNC: 35.9 GM/DL — SIGNIFICANT CHANGE UP (ref 32–36)
MCV RBC AUTO: 96.3 FL — SIGNIFICANT CHANGE UP (ref 80–100)
MCV RBC AUTO: 96.4 FL — SIGNIFICANT CHANGE UP (ref 80–100)
PHOSPHATE SERPL-MCNC: 2.5 MG/DL — SIGNIFICANT CHANGE UP (ref 2.5–4.5)
PHOSPHATE SERPL-MCNC: 2.7 MG/DL — SIGNIFICANT CHANGE UP (ref 2.5–4.5)
PLATELET # BLD AUTO: 213 K/UL — SIGNIFICANT CHANGE UP (ref 150–400)
PLATELET # BLD AUTO: 245 K/UL — SIGNIFICANT CHANGE UP (ref 150–400)
POTASSIUM SERPL-MCNC: 3.6 MMOL/L — SIGNIFICANT CHANGE UP (ref 3.5–5.3)
POTASSIUM SERPL-MCNC: 3.7 MMOL/L — SIGNIFICANT CHANGE UP (ref 3.5–5.3)
POTASSIUM SERPL-SCNC: 3.6 MMOL/L — SIGNIFICANT CHANGE UP (ref 3.5–5.3)
POTASSIUM SERPL-SCNC: 3.7 MMOL/L — SIGNIFICANT CHANGE UP (ref 3.5–5.3)
RBC # BLD: 3.61 M/UL — LOW (ref 3.8–5.2)
RBC # BLD: 3.77 M/UL — LOW (ref 3.8–5.2)
RBC # FLD: 11 % — SIGNIFICANT CHANGE UP (ref 10.3–14.5)
RBC # FLD: 11.5 % — SIGNIFICANT CHANGE UP (ref 10.3–14.5)
SODIUM SERPL-SCNC: 136 MMOL/L — SIGNIFICANT CHANGE UP (ref 135–145)
SODIUM SERPL-SCNC: 138 MMOL/L — SIGNIFICANT CHANGE UP (ref 135–145)
WBC # BLD: 11.7 K/UL — HIGH (ref 3.8–10.5)
WBC # BLD: 13 K/UL — HIGH (ref 3.8–10.5)
WBC # FLD AUTO: 11.7 K/UL — HIGH (ref 3.8–10.5)
WBC # FLD AUTO: 13 K/UL — HIGH (ref 3.8–10.5)

## 2019-03-10 PROCEDURE — 99291 CRITICAL CARE FIRST HOUR: CPT

## 2019-03-10 PROCEDURE — 88342 IMHCHEM/IMCYTCHM 1ST ANTB: CPT | Mod: 26,59

## 2019-03-10 PROCEDURE — 88341 IMHCHEM/IMCYTCHM EA ADD ANTB: CPT | Mod: 26,59

## 2019-03-10 PROCEDURE — 88360 TUMOR IMMUNOHISTOCHEM/MANUAL: CPT | Mod: 26

## 2019-03-10 PROCEDURE — 99292 CRITICAL CARE ADDL 30 MIN: CPT

## 2019-03-10 PROCEDURE — 70450 CT HEAD/BRAIN W/O DYE: CPT | Mod: 26

## 2019-03-10 PROCEDURE — 88307 TISSUE EXAM BY PATHOLOGIST: CPT | Mod: 26

## 2019-03-10 RX ORDER — POTASSIUM CHLORIDE 20 MEQ
40 PACKET (EA) ORAL ONCE
Qty: 0 | Refills: 0 | Status: COMPLETED | OUTPATIENT
Start: 2019-03-10 | End: 2019-03-10

## 2019-03-10 RX ORDER — LACOSAMIDE 50 MG/1
100 TABLET ORAL
Qty: 0 | Refills: 0 | Status: DISCONTINUED | OUTPATIENT
Start: 2019-03-10 | End: 2019-03-14

## 2019-03-10 RX ORDER — CHLORHEXIDINE GLUCONATE 213 G/1000ML
1 SOLUTION TOPICAL
Qty: 0 | Refills: 0 | Status: DISCONTINUED | OUTPATIENT
Start: 2019-03-10 | End: 2019-03-14

## 2019-03-10 RX ORDER — LEVETIRACETAM 250 MG/1
500 TABLET, FILM COATED ORAL
Qty: 0 | Refills: 0 | Status: DISCONTINUED | OUTPATIENT
Start: 2019-03-10 | End: 2019-03-10

## 2019-03-10 RX ORDER — ALPRAZOLAM 0.25 MG
0.25 TABLET ORAL EVERY 6 HOURS
Qty: 0 | Refills: 0 | Status: DISCONTINUED | OUTPATIENT
Start: 2019-03-10 | End: 2019-03-14

## 2019-03-10 RX ADMIN — OXYCODONE AND ACETAMINOPHEN 2 TABLET(S): 5; 325 TABLET ORAL at 22:00

## 2019-03-10 RX ADMIN — Medication 0.25 MILLIGRAM(S): at 12:06

## 2019-03-10 RX ADMIN — Medication 100 MILLIGRAM(S): at 22:01

## 2019-03-10 RX ADMIN — PANTOPRAZOLE SODIUM 40 MILLIGRAM(S): 20 TABLET, DELAYED RELEASE ORAL at 12:06

## 2019-03-10 RX ADMIN — LACOSAMIDE 100 MILLIGRAM(S): 50 TABLET ORAL at 17:16

## 2019-03-10 RX ADMIN — Medication 40 MILLIEQUIVALENT(S): at 23:29

## 2019-03-10 RX ADMIN — Medication 100 MILLIGRAM(S): at 05:14

## 2019-03-10 RX ADMIN — OXYCODONE AND ACETAMINOPHEN 1 TABLET(S): 5; 325 TABLET ORAL at 06:30

## 2019-03-10 RX ADMIN — Medication 0.25 MILLIGRAM(S): at 05:15

## 2019-03-10 RX ADMIN — OXYCODONE AND ACETAMINOPHEN 2 TABLET(S): 5; 325 TABLET ORAL at 22:30

## 2019-03-10 RX ADMIN — OXYCODONE AND ACETAMINOPHEN 1 TABLET(S): 5; 325 TABLET ORAL at 00:30

## 2019-03-10 RX ADMIN — Medication 4 MILLIGRAM(S): at 12:06

## 2019-03-10 RX ADMIN — OXYCODONE AND ACETAMINOPHEN 2 TABLET(S): 5; 325 TABLET ORAL at 15:07

## 2019-03-10 RX ADMIN — Medication 0.25 MILLIGRAM(S): at 23:15

## 2019-03-10 RX ADMIN — Medication 100 MILLIGRAM(S): at 14:02

## 2019-03-10 RX ADMIN — Medication 250 MILLIGRAM(S): at 12:17

## 2019-03-10 RX ADMIN — OXYCODONE AND ACETAMINOPHEN 1 TABLET(S): 5; 325 TABLET ORAL at 11:30

## 2019-03-10 RX ADMIN — CHLORHEXIDINE GLUCONATE 1 APPLICATION(S): 213 SOLUTION TOPICAL at 22:01

## 2019-03-10 RX ADMIN — Medication 4 MILLIGRAM(S): at 05:14

## 2019-03-10 RX ADMIN — OXYCODONE AND ACETAMINOPHEN 1 TABLET(S): 5; 325 TABLET ORAL at 00:00

## 2019-03-10 RX ADMIN — Medication 4 MILLIGRAM(S): at 23:30

## 2019-03-10 RX ADMIN — OXYCODONE AND ACETAMINOPHEN 1 TABLET(S): 5; 325 TABLET ORAL at 10:56

## 2019-03-10 RX ADMIN — Medication 0.25 MILLIGRAM(S): at 19:35

## 2019-03-10 RX ADMIN — OXYCODONE AND ACETAMINOPHEN 2 TABLET(S): 5; 325 TABLET ORAL at 15:45

## 2019-03-10 RX ADMIN — OXYCODONE AND ACETAMINOPHEN 1 TABLET(S): 5; 325 TABLET ORAL at 06:00

## 2019-03-10 RX ADMIN — Medication 4 MILLIGRAM(S): at 17:16

## 2019-03-10 NOTE — PROGRESS NOTE ADULT - SUBJECTIVE AND OBJECTIVE BOX
Chief Complaint/Reason for Visit/HPI:    Reason for Admission:  Reason for Admission	I am having a meningioma removed from my brain     History of Present Illness:  History of Present Illness	  Mrs. Schaefer is a 33 year old woman with PMH Migraines and anxiety who has been having increasing headaches with vomiting and her neurologist Dr. James ordered an MRI/MRV and diagnosed with meningioma which she is now scheduled for left posterior fossa craniotomy for tumor removal 3/9/19.  Dr. Garner started her on dexamethasone 4mg every 8 hours until surgery and due to increasing anxiety about diagnosis and planned surgery started her on Alprazolam 0.25mg.  She has no c/o headache today, however is extremely anxious about the surgery tomorrow.    Allergies/Medications:   Allergies:        Allergies:  	dermabond: Miscellaneous, Hives, Blisters, dermabond  	penicillin: Drug, Unknown, Pt does not know reaction  	shellfish: Food, Unknown, shrimp/crab- pt unsure of reaction found on allergy testing    Home Medications:   * Patient Currently Takes Medications as of 08-Mar-2019 08:01 documented in Structured Notes  · 	Co Q-10 100 mg oral capsule: Last Dose Taken:  , 1 cap(s) orally once a day  · 	magnesium: Last Dose Taken:  , 400 tab(s) orally once a day  · 	ALPRAZolam 0.5 mg oral tablet: Last Dose Taken:  , 0.5 tab(s) orally 3 times a day, As Needed  · 	dexamethasone 4 mg oral tablet: Last Dose Taken:  , 1 tab(s) orally every 8 hours until surgery  · 	Colace 50 mg oral capsule: Last Dose Taken:  , 1 cap(s) orally 2 times a day  · 	famotidine 40 mg oral tablet: Last Dose Taken:  , 1 tab(s) orally once a day (at bedtime)    PMH/PSH/FH/SH:    Past Medical History:  Anxiety  Since dx with meningioma  Migraine without aura and without status migrainosus, not intractable    Mild intermittent asthma without complication  only when ill with cold  Palpitations  Work up done by Dr. Noel 2018  Syncope, unspecified syncope type  Only from intense vomiting from migraine, followed by Bert.     Past Surgical History:  H/O:   .     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  · Lives With	children; spouse     Substance Use History:  · Substance Use	caffeine  · Caffeine Type	coffee  · Caffeine Amount/Frequency	3-4 cups/cans per day  · Caffeine Withdrawal Pattern	na     Alcohol Use History:  · Alcohol Use Comment	once a week, one glass of wine or beer     Tobacco Usage:  · Tobacco Usage: Never smoker      SURGERY: Craniotomy          REVIEW OF SYSTEMS: [ ] Unable to Assess due to neurologic exam   [x ] All ROS addressed below are non-contributory, except:  Neuro: [x ] Headache [ ] Back pain [ ] Numbness [ ] Weakness [ ] Ataxia [ ] Dizziness [ ] Aphasia [ ] Dysarthria [ ] Visual disturbance anxiety   Resp: [ ] Shortness of breath/dyspnea, [ ] Orthopnea [ ] Cough  CV: [ ] Chest pain [ ] Palpitation [ ] Lightheadedness [ ] Syncope  Renal: [ ] Thirst [ ] Edema  GI: [ ] Nausea [ ] Emesis [ ] Abdominal pain [ ] Constipation [ ] Diarrhea  Hem: [ ] Hematemesis [ ] bright red blood per rectum  ID: [ ] Fever [ ] Chills [ ] Dysuria  ENT: [ ] Rhinorrhea          ICU Vital Signs Last 24 Hrs  T(C): 36.8 (09 Mar 2019 07:16), Max: 36.8 (09 Mar 2019 06:18)  T(F): 98.2 (09 Mar 2019 06:18), Max: 98.2 (09 Mar 2019 06:18)  HR: 76 (09 Mar 2019 07:16) (76 - 76)  BP: 118/74 (09 Mar 2019 07:16) (118/74 - 118/74)  BP(mean): --  ABP: --  ABP(mean): --  RR: 18 (09 Mar 2019 07:16) (18 - 18)  SpO2: 99% (09 Mar 2019 07:16) (99% - 99%)                            14.3   8.84  )-----------( 220      ( 08 Mar 2019 13:46 )             42.6    03-08    141  |  101  |  12  ----------------------------<  117<H>  3.8   |  20<L>  |  0.63    Ca    10.0      08 Mar 2019 09:58     ABG - ( 09 Mar 2019 10:38 )  pH, Arterial: 7.51  pH, Blood: x     /  pCO2: 28    /  pO2: 571   / HCO3: 22    / Base Excess: .7    /  SaO2: 100                      PHYSICAL EXAM:    General: No Acute Distress     Neurological: Awake, alert oriented to person, place and time, Following Commands, PERRL, EOMI, Face Symmetrical, Speech Fluent, Moving all extremities, Muscle Strength normal in all four extremities, No Drift, Sensation to Light Touch Intact    Pulmonary: Clear to Auscultation, No Rales, No Rhonchi, No Wheezes     Cardiovascular: S1, S2, Regular Rate and Rhythm     Gastrointestinal: Soft, Nontender, Nondistended     Extremities: No calf tenderness     Incision:       MEDICATIONS:  Antibiotics:   vancomycin  IVPB 1000 milliGRAM(s) IV Intermittent once     Neurological:     Cardiac:     Pulm:    Heme:     Other:        DEVICES: [] Restraints [] SHINE/HMV []LD [] ET tube [] Trach [] Chest Tube [] A-line [] Rapp [] NGT [] Rectal Tube       Impression: Homogeneous enhancing extra axial lesion involving the left   posterior fossa region is identified.    A/P:  Mrs. Schaefer is a 34 yo with left posterior fossa mass, most likely meningeoma, s/p craniotomy today     Neuro:  CT head tomorrow morning neuro checks q 1 hr, MRI brain wwo in few days, decadron for vasogenic edema   EVD at 10 cm water for mild hydrocephalus per NS   xanax for anxiety   keep ICP< 20 mmhg, CPP> 60 mmhg   Respiratory: RA  CV:SBP goal 100-150 mmhg , a line   Endocrine: finger sticks q 6 hrs, ISS  Heme/Onc:   cbc, INR           DVT ppx: SCD, hold lovenox as she is POD0, will need dopplers as she is high risk for DVT given brain tumor   Renal: NS 75 ml/hr , rapp   ID: afebrile, mery-op ancef   GI: advance diet as tolerated   Social/Family: updated at bedside   Discharge planning: ICU     Code Status: [x] Full Code [] DNR [] DNI [] Goals of Care:   Disposition: [x] ICU [] Stroke Unit [] RCU []PCU []Floor [] Discharge Home     Patient at high risk for neurologic deterioration, critical care time, excluding procedures: 35 minutes Chief Complaint/Reason for Visit/HPI:    Reason for Admission:  Reason for Admission	I am having a meningioma removed from my brain     History of Present Illness:  History of Present Illness	  Mrs. Schaefer is a 33 year old woman with PMH Migraines and anxiety who has been having increasing headaches with vomiting and her neurologist Dr. James ordered an MRI/MRV and diagnosed with meningioma which she is now scheduled for left posterior fossa craniotomy for tumor removal 3/9/19.  Dr. Garner started her on dexamethasone 4mg every 8 hours until surgery and due to increasing anxiety about diagnosis and planned surgery started her on Alprazolam 0.25mg.  She has no c/o headache today, however is extremely anxious about the surgery tomorrow.    Allergies/Medications:   Allergies:        Allergies:  	dermabond: Miscellaneous, Hives, Blisters, dermabond  	penicillin: Drug, Unknown, Pt does not know reaction  	shellfish: Food, Unknown, shrimp/crab- pt unsure of reaction found on allergy testing    Home Medications:   * Patient Currently Takes Medications as of 08-Mar-2019 08:01 documented in Structured Notes  · 	Co Q-10 100 mg oral capsule: Last Dose Taken:  , 1 cap(s) orally once a day  · 	magnesium: Last Dose Taken:  , 400 tab(s) orally once a day  · 	ALPRAZolam 0.5 mg oral tablet: Last Dose Taken:  , 0.5 tab(s) orally 3 times a day, As Needed  · 	dexamethasone 4 mg oral tablet: Last Dose Taken:  , 1 tab(s) orally every 8 hours until surgery  · 	Colace 50 mg oral capsule: Last Dose Taken:  , 1 cap(s) orally 2 times a day  · 	famotidine 40 mg oral tablet: Last Dose Taken:  , 1 tab(s) orally once a day (at bedtime)    PMH/PSH/FH/SH:    Past Medical History:  Anxiety  Since dx with meningioma  Migraine without aura and without status migrainosus, not intractable    Mild intermittent asthma without complication  only when ill with cold  Palpitations  Work up done by Dr. Noel 2018  Syncope, unspecified syncope type  Only from intense vomiting from migraine, followed by Bert.     Past Surgical History:  H/O:   .     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  · Lives With	children; spouse     Substance Use History:  · Substance Use	caffeine  · Caffeine Type	coffee  · Caffeine Amount/Frequency	3-4 cups/cans per day  · Caffeine Withdrawal Pattern	na     Alcohol Use History:  · Alcohol Use Comment	once a week, one glass of wine or beer     Tobacco Usage:  · Tobacco Usage: Never smoker      SURGERY: Craniotomy          REVIEW OF SYSTEMS: [ ] Unable to Assess due to neurologic exam   [x ] All ROS addressed below are non-contributory, except:  Neuro: [x ] Headache [ ] Back pain [ ] Numbness [ ] Weakness [ ] Ataxia [ ] Dizziness [ ] Aphasia [ ] Dysarthria [ ] Visual disturbance anxiety   Resp: [ ] Shortness of breath/dyspnea, [ ] Orthopnea [ ] Cough  CV: [ ] Chest pain [ ] Palpitation [ ] Lightheadedness [ ] Syncope  Renal: [ ] Thirst [ ] Edema  GI: [ ] Nausea [ ] Emesis [ ] Abdominal pain [ ] Constipation [ ] Diarrhea  Hem: [ ] Hematemesis [ ] bright red blood per rectum  ID: [ ] Fever [ ] Chills [ ] Dysuria  ENT: [ ] Rhinorrhea    O: EVD 10 cm water       T(C): 37.3 (03-10-19 @ 07:00), Max: 37.3 (03-10-19 @ 07:00)  HR: 63 (03-10-19 @ 10:00) (60 - 89)  BP: 116/75 (19 @ 22:00) (99/58 - 147/74)  RR: 14 (03-10-19 @ 10:00) (11 - 20)  SpO2: 100% (03-10-19 @ 10:00) (99% - 100%)  19 @ 06:01  -  03-10-19 @ 07:00  --------------------------------------------------------  IN: 1930 mL / OUT: 1192 mL / NET: 738 mL    03-10-19 @ 07:01  -  03-10-19 @ 10:51  --------------------------------------------------------  IN: 0 mL / OUT: 34 mL / NET: -34 mL    ALPRAZolam 0.25 milliGRAM(s) Oral every 6 hours PRN  chlorhexidine 4% Liquid 1 Application(s) Topical <User Schedule>  dexamethasone  Injectable 4 milliGRAM(s) IV Push every 6 hours  docusate sodium 100 milliGRAM(s) Oral three times a day  levETIRAcetam 500 milliGRAM(s) Oral two times a day  oxyCODONE    5 mG/acetaminophen 325 mG 1 Tablet(s) Oral every 4 hours PRN  oxyCODONE    5 mG/acetaminophen 325 mG 2 Tablet(s) Oral every 6 hours PRN  pantoprazole  Injectable 40 milliGRAM(s) IV Push daily  vancomycin  IVPB 1000 milliGRAM(s) IV Intermittent once  vancomycin  IVPB 1000 milliGRAM(s) IV Intermittent daily                 PHYSICAL EXAM:    General: No Acute Distress     Neurological: Awake, alert oriented to person, place and time, Following Commands, PERRL, EOMI, Face Symmetrical, Speech Fluent, Moving all extremities, Muscle Strength normal in all four extremities, No Drift, Sensation to Light Touch Intact    Pulmonary: Clear to Auscultation, No Rales, No Rhonchi, No Wheezes     Cardiovascular: S1, S2, Regular Rate and Rhythm     Gastrointestinal: Soft, Nontender, Nondistended     Extremities: No calf tenderness     Incision:       Impression: Homogeneous enhancing extra axial lesion involving the left   posterior fossa region is identified.    A/P:  Mrs. Schaefer is a 34 yo with left posterior fossa mass, most likely meningeoma, s/p craniotomy, EVD for hydrocephalus   post-op ICH   Neuro:  CT head tomorrow as she has small IPH, neuro  q 1 hr, MRI brain wwo in few days, decadron for vasogenic edema   EVD at 10 cm water for mild hydrocephalus per NS   xanax for anxiety   change keprpa to vimpat as she has lots of anxiety   keep ICP< 20 mmhg, CPP> 60 mmhg   Respiratory: RA  CV:SBP goal 100-150 mmhg  d/c a line   Endocrine: finger sticks q 6 hrs, ISS   Heme/Onc:   cbc, INR           DVT ppx: SCD, hold lovenox as she has post-op ICH , will repeat CT head if ICH stable, will start lovenox, dopplers    Renal: IVL   ID: afebrile, mery-op ancef   GI: advance diet as tolerated   Social/Family: updated at bedside   Discharge planning: ICU     Code Status: [x] Full Code [] DNR [] DNI [] Goals of Care:   Disposition: [x] ICU [] Stroke Unit [] RCU []PCU []Floor [] Discharge Home     Patient at high risk for neurologic deterioration, critical care time, excluding procedures: 35 minutes Chief Complaint/Reason for Visit/HPI:    Reason for Admission:  Reason for Admission	I am having a meningioma removed from my brain     History of Present Illness:  History of Present Illness	  Mrs. Schaefer is a 33 year old woman with PMH Migraines and anxiety who has been having increasing headaches with vomiting and her neurologist Dr. James ordered an MRI/MRV and diagnosed with meningioma which she is now scheduled for left posterior fossa craniotomy for tumor removal 3/9/19.  Dr. Garner started her on dexamethasone 4mg every 8 hours until surgery and due to increasing anxiety about diagnosis and planned surgery started her on Alprazolam 0.25mg.  She has no c/o headache today, however is extremely anxious about the surgery tomorrow.    Allergies/Medications:   Allergies:        Allergies:  	dermabond: Miscellaneous, Hives, Blisters, dermabond  	penicillin: Drug, Unknown, Pt does not know reaction  	shellfish: Food, Unknown, shrimp/crab- pt unsure of reaction found on allergy testing    Home Medications:   * Patient Currently Takes Medications as of 08-Mar-2019 08:01 documented in Structured Notes  · 	Co Q-10 100 mg oral capsule: Last Dose Taken:  , 1 cap(s) orally once a day  · 	magnesium: Last Dose Taken:  , 400 tab(s) orally once a day  · 	ALPRAZolam 0.5 mg oral tablet: Last Dose Taken:  , 0.5 tab(s) orally 3 times a day, As Needed  · 	dexamethasone 4 mg oral tablet: Last Dose Taken:  , 1 tab(s) orally every 8 hours until surgery  · 	Colace 50 mg oral capsule: Last Dose Taken:  , 1 cap(s) orally 2 times a day  · 	famotidine 40 mg oral tablet: Last Dose Taken:  , 1 tab(s) orally once a day (at bedtime)    PMH/PSH/FH/SH:    Past Medical History:  Anxiety  Since dx with meningioma  Migraine without aura and without status migrainosus, not intractable    Mild intermittent asthma without complication  only when ill with cold  Palpitations  Work up done by Dr. Noel 2018  Syncope, unspecified syncope type  Only from intense vomiting from migraine, followed by Bert.     Past Surgical History:  H/O:   .     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  · Lives With	children; spouse     Substance Use History:  · Substance Use	caffeine  · Caffeine Type	coffee  · Caffeine Amount/Frequency	3-4 cups/cans per day  · Caffeine Withdrawal Pattern	na     Alcohol Use History:  · Alcohol Use Comment	once a week, one glass of wine or beer     Tobacco Usage:  · Tobacco Usage: Never smoker      SURGERY: Craniotomy          REVIEW OF SYSTEMS: [ ] Unable to Assess due to neurologic exam   [x ] All ROS addressed below are non-contributory, except:  Neuro: [x ] Headache [ ] Back pain [ ] Numbness [ ] Weakness [ ] Ataxia [ ] Dizziness [ ] Aphasia [ ] Dysarthria [ ] Visual disturbance anxiety   Resp: [ ] Shortness of breath/dyspnea, [ ] Orthopnea [ ] Cough  CV: [ ] Chest pain [ ] Palpitation [ ] Lightheadedness [ ] Syncope  Renal: [ ] Thirst [ ] Edema  GI: [ ] Nausea [ ] Emesis [ ] Abdominal pain [ ] Constipation [ ] Diarrhea  Hem: [ ] Hematemesis [ ] bright red blood per rectum  ID: [ ] Fever [ ] Chills [ ] Dysuria  ENT: [ ] Rhinorrhea    O: EVD 10 cm water       T(C): 37.3 (03-10-19 @ 07:00), Max: 37.3 (03-10-19 @ 07:00)  HR: 63 (03-10-19 @ 10:00) (60 - 89)  BP: 116/75 (19 @ 22:00) (99/58 - 147/74)  RR: 14 (03-10-19 @ 10:00) (11 - 20)  SpO2: 100% (03-10-19 @ 10:00) (99% - 100%)  19 @ 06:01  -  03-10-19 @ 07:00  --------------------------------------------------------  IN: 1930 mL / OUT: 1192 mL / NET: 738 mL    03-10-19 @ 07:01  -  03-10-19 @ 10:51  --------------------------------------------------------  IN: 0 mL / OUT: 34 mL / NET: -34 mL    ALPRAZolam 0.25 milliGRAM(s) Oral every 6 hours PRN  chlorhexidine 4% Liquid 1 Application(s) Topical <User Schedule>  dexamethasone  Injectable 4 milliGRAM(s) IV Push every 6 hours  docusate sodium 100 milliGRAM(s) Oral three times a day  levETIRAcetam 500 milliGRAM(s) Oral two times a day  oxyCODONE    5 mG/acetaminophen 325 mG 1 Tablet(s) Oral every 4 hours PRN  oxyCODONE    5 mG/acetaminophen 325 mG 2 Tablet(s) Oral every 6 hours PRN  pantoprazole  Injectable 40 milliGRAM(s) IV Push daily  vancomycin  IVPB 1000 milliGRAM(s) IV Intermittent once  vancomycin  IVPB 1000 milliGRAM(s) IV Intermittent daily                 PHYSICAL EXAM:    General: No Acute Distress     Neurological: Awake, alert oriented to person, place and time, Following Commands, PERRL, EOMI, Face Symmetrical, Speech Fluent, Moving all extremities, Muscle Strength normal in all four extremities, No Drift, Sensation to Light Touch Intact    Pulmonary: Clear to Auscultation, No Rales, No Rhonchi, No Wheezes     Cardiovascular: S1, S2, Regular Rate and Rhythm     Gastrointestinal: Soft, Nontender, Nondistended     Extremities: No calf tenderness     Incision:       Impression: Homogeneous enhancing extra axial lesion involving the left   posterior fossa region is identified.    A/P:  Mrs. Schaefer is a 32 yo with left posterior fossa mass, most likely meningeoma, s/p craniotomy, EVD for hydrocephalus   post-op ICH   Neuro:  CT head tomorrow as she has small IPH, neuro  q 4 hr, MRI brain wwo in few days, decadron for vasogenic edema   EVD at 10 cm water for mild hydrocephalus per NS   xanax for anxiety   PT/OT  change keprpa to vimpat as she has lots of anxiety   keep ICP< 20 mmhg, CPP> 60 mmhg   Respiratory: RA  CV:SBP goal 100-150 mmhg  d/c a line   Endocrine: finger sticks q 6 hrs, ISS   Heme/Onc:   cbc, INR           DVT ppx: SCD, hold lovenox as she has post-op ICH , will repeat CT head if ICH stable, will start lovenox, dopplers    Renal: IVL   ID: afebrile, mery-op ancef   GI: advance diet as tolerated   Social/Family: updated at bedside   Discharge planning: ICU     Code Status: [x] Full Code [] DNR [] DNI [] Goals of Care:   Disposition: [x] ICU [] Stroke Unit [] RCU []PCU []Floor [] Discharge Home     Patient at high risk for neurologic deterioration, critical care time, excluding procedures: 35 minutes

## 2019-03-10 NOTE — PHYSICAL THERAPY INITIAL EVALUATION ADULT - ACTIVE RANGE OF MOTION EXAMINATION, REHAB EVAL
R shoulder flexion - limited due to pain/bilateral  lower extremity Active ROM was WFL (within functional limits)/deficits as listed below/bilateral upper extremity Active ROM was WFL (within functional limits)

## 2019-03-10 NOTE — PROGRESS NOTE ADULT - ASSESSMENT
33F s/p suboccipital craniotomy for resection of meningioma. Patient is doing well.    Q1 hour neurocheck  - continue EVD  - CTH in AM

## 2019-03-10 NOTE — PROGRESS NOTE ADULT - ASSESSMENT
Meningioma, post-operative day 1  - external ventricular drain for posterior fossa compression and risk for hydrocephalus   - steroids for brain edema  - pain control  - Xanax for anxiety    40 minutes critical care time

## 2019-03-10 NOTE — PHYSICAL THERAPY INITIAL EVALUATION ADULT - GENERAL OBSERVATIONS, REHAB EVAL
. Pt rec'ed seated in bedside chair, +EVD (clamped by RN/Janee for session), R radial a-line, +IV, +tele, dagoberto PT eval w/o adverse reaction

## 2019-03-10 NOTE — PHYSICAL THERAPY INITIAL EVALUATION ADULT - ADDITIONAL COMMENTS
Pt lives in a private house w/  & 3 steps to enter (no handrails) and a flight of steps to get to bedroom/bathroom w/ handrails. Pt notes she has a couch & half bath on the first floor. PTA pt (I) w/ functional mobility & ADL w/o AD

## 2019-03-10 NOTE — PHYSICAL THERAPY INITIAL EVALUATION ADULT - PLANNED THERAPY INTERVENTIONS, PT EVAL
gait training/transfer training/balance training/GOAL: pt will negotiate a flight of steps (I) w/i 2wks

## 2019-03-10 NOTE — PROGRESS NOTE ADULT - SUBJECTIVE AND OBJECTIVE BOX
Neurosurgery Resident Note    Overnight Events: no acute events    Clinical Course: 33yof s/p suboccipital craniotomy for resection of posterior fossa meningioma. POD # 1. The patient has been doing well since surgery.    VS: T(C): 36.9 (03-09-19 @ 23:00)  HR: 79 (03-10-19 @ 01:00)  BP: 116/75 (03-09-19 @ 22:00)  RR: 20 (03-10-19 @ 01:00)  SpO2: 100% (03-10-19 @ 01:00)  Wt(kg): --    Exam:   AAOx3  PERRL, EOMI, face symmetric, no tongue deviation  5/5 throughout, no pronator drift  Sensation intact to light touch throughout  Reflexes 2+ throughout  No dysmetria    Drains:   evd draining up to 20 cc/hr    Medications:                           12.4   13.0  )-----------( 213      ( 10 Mar 2019 00:05 )             34.7     03-10    138  |  104  |  7   ----------------------------<  134<H>  3.6   |  20<L>  |  0.52    Ca    8.4      10 Mar 2019 00:05  Phos  2.5     03-10  Mg     2.0     03-10

## 2019-03-10 NOTE — PHYSICAL THERAPY INITIAL EVALUATION ADULT - PERTINENT HX OF CURRENT PROBLEM, REHAB EVAL
33yoF w/ PMH Migraines & anxiety who has been having increasing HA w/ vomiting & her neurologist Dr. James ordered an MRI/MRV & dx w/ meningioma. Pt now s/p left suboccipital craniotomy for tumor removal 3/9/19.

## 2019-03-11 PROCEDURE — 99291 CRITICAL CARE FIRST HOUR: CPT

## 2019-03-11 PROCEDURE — 70450 CT HEAD/BRAIN W/O DYE: CPT | Mod: 26

## 2019-03-11 PROCEDURE — 99292 CRITICAL CARE ADDL 30 MIN: CPT

## 2019-03-11 RX ORDER — DEXAMETHASONE 0.5 MG/5ML
4 ELIXIR ORAL EVERY 6 HOURS
Qty: 0 | Refills: 0 | Status: DISCONTINUED | OUTPATIENT
Start: 2019-03-11 | End: 2019-03-14

## 2019-03-11 RX ORDER — ENOXAPARIN SODIUM 100 MG/ML
40 INJECTION SUBCUTANEOUS
Qty: 0 | Refills: 0 | Status: DISCONTINUED | OUTPATIENT
Start: 2019-03-11 | End: 2019-03-14

## 2019-03-11 RX ORDER — FAMOTIDINE 10 MG/ML
20 INJECTION INTRAVENOUS DAILY
Qty: 0 | Refills: 0 | Status: DISCONTINUED | OUTPATIENT
Start: 2019-03-11 | End: 2019-03-11

## 2019-03-11 RX ORDER — FAMOTIDINE 10 MG/ML
20 INJECTION INTRAVENOUS DAILY
Qty: 0 | Refills: 0 | Status: DISCONTINUED | OUTPATIENT
Start: 2019-03-11 | End: 2019-03-14

## 2019-03-11 RX ADMIN — Medication 0.25 MILLIGRAM(S): at 08:36

## 2019-03-11 RX ADMIN — FAMOTIDINE 20 MILLIGRAM(S): 10 INJECTION INTRAVENOUS at 12:01

## 2019-03-11 RX ADMIN — Medication 4 MILLIGRAM(S): at 06:30

## 2019-03-11 RX ADMIN — Medication 0.25 MILLIGRAM(S): at 21:15

## 2019-03-11 RX ADMIN — Medication 4 MILLIGRAM(S): at 12:01

## 2019-03-11 RX ADMIN — OXYCODONE AND ACETAMINOPHEN 1 TABLET(S): 5; 325 TABLET ORAL at 21:30

## 2019-03-11 RX ADMIN — Medication 100 MILLIGRAM(S): at 06:30

## 2019-03-11 RX ADMIN — Medication 100 MILLIGRAM(S): at 21:16

## 2019-03-11 RX ADMIN — OXYCODONE AND ACETAMINOPHEN 2 TABLET(S): 5; 325 TABLET ORAL at 06:30

## 2019-03-11 RX ADMIN — CHLORHEXIDINE GLUCONATE 1 APPLICATION(S): 213 SOLUTION TOPICAL at 21:16

## 2019-03-11 RX ADMIN — OXYCODONE AND ACETAMINOPHEN 1 TABLET(S): 5; 325 TABLET ORAL at 21:00

## 2019-03-11 RX ADMIN — OXYCODONE AND ACETAMINOPHEN 2 TABLET(S): 5; 325 TABLET ORAL at 12:38

## 2019-03-11 RX ADMIN — OXYCODONE AND ACETAMINOPHEN 2 TABLET(S): 5; 325 TABLET ORAL at 13:15

## 2019-03-11 RX ADMIN — Medication 100 MILLIGRAM(S): at 13:22

## 2019-03-11 RX ADMIN — LACOSAMIDE 100 MILLIGRAM(S): 50 TABLET ORAL at 06:32

## 2019-03-11 RX ADMIN — Medication 4 MILLIGRAM(S): at 17:18

## 2019-03-11 RX ADMIN — LACOSAMIDE 100 MILLIGRAM(S): 50 TABLET ORAL at 17:17

## 2019-03-11 RX ADMIN — OXYCODONE AND ACETAMINOPHEN 2 TABLET(S): 5; 325 TABLET ORAL at 07:00

## 2019-03-11 RX ADMIN — ENOXAPARIN SODIUM 40 MILLIGRAM(S): 100 INJECTION SUBCUTANEOUS at 17:17

## 2019-03-11 NOTE — PROGRESS NOTE ADULT - SUBJECTIVE AND OBJECTIVE BOX
Vital Signs Last 24 Hrs  T(C): 36.7 (10 Mar 2019 23:00), Max: 37.3 (10 Mar 2019 07:00)  T(F): 98 (10 Mar 2019 23:00), Max: 99.2 (10 Mar 2019 07:00)  HR: 57 (10 Mar 2019 23:00) (57 - 79)  BP: --  BP(mean): --  RR: 16 (10 Mar 2019 23:00) (11 - 22)  SpO2: 99% (10 Mar 2019 23:00) (99% - 100%)    Exam:   AAOx3  PERRL, EOMI, face symmetric, no tongue deviation  5/5 throughout, no pronator drift  Sensation intact to light touch throughout  No dysmetria

## 2019-03-11 NOTE — DIETITIAN INITIAL EVALUATION ADULT. - ENERGY NEEDS
32 y/o female with history of migraines and anxiety who has been having increasing headaches with vomiting. MRI/MRV showed meningioma, pt now POD #2 s/p suboccipital craniotomy for resection of meningioma  Ht: 60"         Wt: 136 lbs        BMI: 26.6 kg/m2         IBW: 100 lbs      %IBW: 136  %  As per chart: no pressure ulcers; no edema

## 2019-03-11 NOTE — ANESTHESIA FOLLOW-UP NOTE - NSEVALATION_GEN_ALL_CORE
No apparent complications or complaints regarding anesthesia care at this time
All questions were answered/No apparent complications or complaints regarding anesthesia care at this time

## 2019-03-11 NOTE — PROGRESS NOTE ADULT - ASSESSMENT
33F s/p suboccipital craniotomy for resection of meningioma. Patient is doing well.    Q1 hour neurocheck  - continue EVD  - CTH in AM  - dex 4q6 (wc 11.7)  - Na 136- eunatremic goal

## 2019-03-11 NOTE — DIETITIAN INITIAL EVALUATION ADULT. - NS AS NUTRI INTERV MEDICAL AND FOOD SUPPLEMENTS
Protein supplements available if PO intake can not meet estimated needs Commercial beverage/Pt requesting Promote drink 2 x daily to help meet protein needs

## 2019-03-11 NOTE — PROGRESS NOTE ADULT - ASSESSMENT
Meningioma, post-operative day 2  - external ventricular drain for posterior fossa compression and risk for hydrocephalus   - steroids for brain edema  - pain control  - Xanax for anxiety    40 minutes critical care time

## 2019-03-11 NOTE — PROGRESS NOTE ADULT - SUBJECTIVE AND OBJECTIVE BOX
Ambulated around unit with assistance.     Awake, alert, fully oriented, PERRL, face symmetric, no drift, full strength

## 2019-03-11 NOTE — DIETITIAN INITIAL EVALUATION ADULT. - ORAL INTAKE PTA
fair/Pt reports a good PO intake usually, however had a reduced appetite and intake for 3 days PTA due to anxiety about upcoming surgery. Confirms allergy to shellfish and reports taking CoQ-10 and Magnesium for the past 2 weeks.

## 2019-03-11 NOTE — PROGRESS NOTE ADULT - SUBJECTIVE AND OBJECTIVE BOX
Mrs. Schaefer is a 33 year old woman with PMH Migraines and anxiety who has been having increasing headaches with vomiting and her neurologist Dr. James ordered an MRI/MRV and diagnosed with meningioma which she is now scheduled for left posterior fossa craniotomy for tumor removal 3/9/19.  Dr. Garner started her on dexamethasone 4mg every 8 hours until surgery and due to increasing anxiety about diagnosis and planned surgery started her on Alprazolam 0.25mg.  She has no c/o headache today, however is extremely anxious about the surgery tomorrow.    REVIEW OF SYSTEMS: [ ] Unable to Assess due to neurologic exam   [x ] All ROS addressed below are non-contributory, except:  Neuro: [ ] Headache [ ] Back pain [ ] Numbness [ ] Weakness [ ] Ataxia [ ] Dizziness [ ] Aphasia [ ] Dysarthria [ ] Visual disturbance anxiety   Resp: [ ] Shortness of breath/dyspnea, [ ] Orthopnea [ ] Cough  CV: [ ] Chest pain [ ] Palpitation [ ] Lightheadedness [ ] Syncope  Renal: [ ] Thirst [ ] Edema  GI: [ ] Nausea [ ] Emesis [ ] Abdominal pain [ ] Constipation [ ] Diarrhea  Hem: [ ] Hematemesis [ ] bright red blood per rectum  ID: [ ] Fever [ ] Chills [ ] Dysuria  ENT: [ ] Rhinorrhea    EVD raised to [id number]    Vitals/labs/meds/imaging reviewed    PHYSICAL EXAM:    General: No Acute Distress     Neurological: Awake, alert oriented to person, place and time, Following Commands, PERRL, EOMI, Face Symmetrical, Speech Fluent, Moving all extremities, Muscle Strength normal in all four extremities, No Drift, Sensation to Light Touch Intact    Pulmonary: Clear to Auscultation, No Rales, No Rhonchi, No Wheezes     Cardiovascular: S1, S2, Regular Rate and Rhythm     Gastrointestinal: Soft, Nontender, Nondistended     Extremities: No calf tenderness     Incision:

## 2019-03-11 NOTE — DIETITIAN INITIAL EVALUATION ADULT. - OTHER INFO
Pt seen for LOS on NSCU. Pt seen for LOS on NSCU. Pt reports appetite has been improving and able to consume at least 50% of meals. No c/o nausea/ vomiting or constipation or diarrhea. Last BM 3/9. No difficulties chewing or swallowing reported. Encouraged pt to consume protein to aid in healing, pt requesting Promote drink 2 x daily to help meet increased protein needs.

## 2019-03-11 NOTE — PROGRESS NOTE ADULT - ASSESSMENT
Impression: Homogeneous enhancing extra axial lesion involving the left   posterior fossa region is identified.    A/P:  Mrs. Schaefer is a 34 yo with left posterior fossa mass, most likely meningeoma, s/p craniotomy, EVD for hydrocephalus   POD#2  repeat CTH stable   Neuro:  CT head tomorrow as she has small IPH  MRI brain wwo in few days  decadron for vasogenic edema   EVD at 20 cm water for mild hydrocephalus per NS   xanax for anxiety   PT/OT  change keprpa to vimpat as she has lots of anxiety   keep ICP< 20 mmhg, CPP> 60 mmhg   Respiratory: RA  CV:SBP goal 100-150 mmhg  d/c a line   Endocrine: finger sticks q 6 hrs, ISS   Heme/Onc:   cbc, INR           DVT ppx: SCD, start lovenox  Renal: IVL   ID: afebrile, mery-op ancef   GI: advance diet as tolerated   Social/Family: updated at bedside   Discharge planning: ICU     Code Status: [x] Full Code [] DNR [] DNI [] Goals of Care:   Disposition: [x] ICU [] Stroke Unit [] RCU []PCU []Floor [] Discharge Home     Patient at high risk for neurologic deterioration, critical care time, excluding procedures: 45 minutes

## 2019-03-12 LAB
ANION GAP SERPL CALC-SCNC: 13 MMOL/L — SIGNIFICANT CHANGE UP (ref 5–17)
BUN SERPL-MCNC: 14 MG/DL — SIGNIFICANT CHANGE UP (ref 7–23)
CALCIUM SERPL-MCNC: 8.9 MG/DL — SIGNIFICANT CHANGE UP (ref 8.4–10.5)
CHLORIDE SERPL-SCNC: 98 MMOL/L — SIGNIFICANT CHANGE UP (ref 96–108)
CO2 SERPL-SCNC: 24 MMOL/L — SIGNIFICANT CHANGE UP (ref 22–31)
CREAT SERPL-MCNC: 0.62 MG/DL — SIGNIFICANT CHANGE UP (ref 0.5–1.3)
GLUCOSE SERPL-MCNC: 160 MG/DL — HIGH (ref 70–99)
MAGNESIUM SERPL-MCNC: 1.8 MG/DL — SIGNIFICANT CHANGE UP (ref 1.6–2.6)
PHOSPHATE SERPL-MCNC: 2.6 MG/DL — SIGNIFICANT CHANGE UP (ref 2.5–4.5)
POTASSIUM SERPL-MCNC: 3.9 MMOL/L — SIGNIFICANT CHANGE UP (ref 3.5–5.3)
POTASSIUM SERPL-SCNC: 3.9 MMOL/L — SIGNIFICANT CHANGE UP (ref 3.5–5.3)
SODIUM SERPL-SCNC: 135 MMOL/L — SIGNIFICANT CHANGE UP (ref 135–145)
SURGICAL PATHOLOGY STUDY: SIGNIFICANT CHANGE UP

## 2019-03-12 PROCEDURE — 93970 EXTREMITY STUDY: CPT | Mod: 26

## 2019-03-12 PROCEDURE — 99291 CRITICAL CARE FIRST HOUR: CPT

## 2019-03-12 RX ORDER — SENNA PLUS 8.6 MG/1
2 TABLET ORAL AT BEDTIME
Qty: 0 | Refills: 0 | Status: DISCONTINUED | OUTPATIENT
Start: 2019-03-12 | End: 2019-03-14

## 2019-03-12 RX ORDER — POLYETHYLENE GLYCOL 3350 17 G/17G
17 POWDER, FOR SOLUTION ORAL EVERY 12 HOURS
Qty: 0 | Refills: 0 | Status: DISCONTINUED | OUTPATIENT
Start: 2019-03-12 | End: 2019-03-14

## 2019-03-12 RX ADMIN — FAMOTIDINE 20 MILLIGRAM(S): 10 INJECTION INTRAVENOUS at 11:26

## 2019-03-12 RX ADMIN — Medication 100 MILLIGRAM(S): at 13:51

## 2019-03-12 RX ADMIN — Medication 100 MILLIGRAM(S): at 21:02

## 2019-03-12 RX ADMIN — LACOSAMIDE 100 MILLIGRAM(S): 50 TABLET ORAL at 05:11

## 2019-03-12 RX ADMIN — OXYCODONE AND ACETAMINOPHEN 2 TABLET(S): 5; 325 TABLET ORAL at 04:30

## 2019-03-12 RX ADMIN — LACOSAMIDE 100 MILLIGRAM(S): 50 TABLET ORAL at 17:24

## 2019-03-12 RX ADMIN — SENNA PLUS 2 TABLET(S): 8.6 TABLET ORAL at 21:02

## 2019-03-12 RX ADMIN — OXYCODONE AND ACETAMINOPHEN 2 TABLET(S): 5; 325 TABLET ORAL at 13:30

## 2019-03-12 RX ADMIN — Medication 4 MILLIGRAM(S): at 17:24

## 2019-03-12 RX ADMIN — Medication 4 MILLIGRAM(S): at 11:26

## 2019-03-12 RX ADMIN — Medication 4 MILLIGRAM(S): at 00:25

## 2019-03-12 RX ADMIN — Medication 4 MILLIGRAM(S): at 05:11

## 2019-03-12 RX ADMIN — OXYCODONE AND ACETAMINOPHEN 2 TABLET(S): 5; 325 TABLET ORAL at 21:30

## 2019-03-12 RX ADMIN — Medication 0.25 MILLIGRAM(S): at 04:22

## 2019-03-12 RX ADMIN — OXYCODONE AND ACETAMINOPHEN 2 TABLET(S): 5; 325 TABLET ORAL at 12:59

## 2019-03-12 RX ADMIN — CHLORHEXIDINE GLUCONATE 1 APPLICATION(S): 213 SOLUTION TOPICAL at 21:02

## 2019-03-12 RX ADMIN — ENOXAPARIN SODIUM 40 MILLIGRAM(S): 100 INJECTION SUBCUTANEOUS at 17:24

## 2019-03-12 RX ADMIN — POLYETHYLENE GLYCOL 3350 17 GRAM(S): 17 POWDER, FOR SOLUTION ORAL at 17:24

## 2019-03-12 RX ADMIN — OXYCODONE AND ACETAMINOPHEN 2 TABLET(S): 5; 325 TABLET ORAL at 05:00

## 2019-03-12 RX ADMIN — OXYCODONE AND ACETAMINOPHEN 2 TABLET(S): 5; 325 TABLET ORAL at 22:30

## 2019-03-12 NOTE — PROGRESS NOTE ADULT - ASSESSMENT
Impression: Homogeneous enhancing extra axial lesion involving the left   posterior fossa region is identified.    A/P:  Mrs. Schaefer is a 32 yo with left posterior fossa mass, most likely meningeoma, s/p craniotomy, EVD for hydrocephalus   POD#3  repeat CTH stable   Neuro:  CT head tomorrow as she has small IPH  MRI brain wwo in few days  decadron for vasogenic edema   EVD at 20 cm water; clamp at 6pm per neurousrgery  xanax for anxiety   PT/OT  change keprpa to vimpat as she has lots of anxiety   keep ICP< 20 mmhg, CPP> 60 mmhg   Respiratory: RA  CV:SBP goal 100-150 mmhg  d/c a line   Endocrine: finger sticks q 6 hrs, ISS   Heme/Onc:   cbc, INR           DVT ppx: SCD, start lovenox  Renal: IVL   ID: afebrile, mery-op ancef   GI: advance diet as tolerated   Social/Family: updated at bedside   Discharge planning: ICU     Code Status: [x] Full Code [] DNR [] DNI [] Goals of Care:   Disposition: [x] ICU [] Stroke Unit [] RCU []PCU []Floor [] Discharge Home     Patient at high risk for neurologic deterioration, critical care time, excluding procedures: 30 minutes

## 2019-03-12 NOTE — PROGRESS NOTE ADULT - SUBJECTIVE AND OBJECTIVE BOX
Mrs. Schaefer is a 33 year old woman with PMH Migraines and anxiety who has been having increasing headaches with vomiting and her neurologist Dr. James ordered an MRI/MRV and diagnosed with meningioma which she is now scheduled for left posterior fossa craniotomy for tumor removal 3/9/19.  Dr. Garner started her on dexamethasone 4mg every 8 hours until surgery and due to increasing anxiety about diagnosis and planned surgery started her on Alprazolam 0.25mg.  She has no c/o headache today, however is extremely anxious about the surgery tomorrow.    REVIEW OF SYSTEMS: [ ] Unable to Assess due to neurologic exam   [x ] All ROS addressed below are non-contributory, except:  Neuro: [ ] Headache [ ] Back pain [ ] Numbness [ ] Weakness [ ] Ataxia [ ] Dizziness [ ] Aphasia [ ] Dysarthria [ ] Visual disturbance anxiety   Resp: [ ] Shortness of breath/dyspnea, [ ] Orthopnea [ ] Cough  CV: [ ] Chest pain [ ] Palpitation [ ] Lightheadedness [ ] Syncope  Renal: [ ] Thirst [ ] Edema  GI: [ ] Nausea [ ] Emesis [ ] Abdominal pain [ ] Constipation [ ] Diarrhea  Hem: [ ] Hematemesis [ ] bright red blood per rectum  ID: [ ] Fever [ ] Chills [ ] Dysuria  ENT: [ ] Rhinorrhea    EVD raised to [id number]  ambulating well    Vitals/labs/meds/imaging reviewed    PHYSICAL EXAM:    General: No Acute Distress     Neurological: Awake, alert oriented to person, place and time, Following Commands, PERRL, EOMI, Face Symmetrical, Speech Fluent, Moving all extremities, Muscle Strength normal in all four extremities, No Drift, Sensation to Light Touch Intact    Pulmonary: Clear to Auscultation, No Rales, No Rhonchi, No Wheezes     Cardiovascular: S1, S2, Regular Rate and Rhythm     Gastrointestinal: Soft, Nontender, Nondistended     Extremities: No calf tenderness     Incision:

## 2019-03-12 NOTE — PROGRESS NOTE ADULT - SUBJECTIVE AND OBJECTIVE BOX
Vital Signs Last 24 Hrs  T(C): 36.7 (11 Mar 2019 19:00), Max: 36.9 (11 Mar 2019 03:00)  T(F): 98 (11 Mar 2019 19:00), Max: 98.4 (11 Mar 2019 03:00)  HR: 73 (11 Mar 2019 19:00) (59 - 78)  BP: 126/79 (11 Mar 2019 19:00) (114/76 - 126/79)  BP(mean): 92 (11 Mar 2019 19:00) (76 - 92)  RR: 14 (11 Mar 2019 19:00) (14 - 22)  SpO2: 100% (11 Mar 2019 19:00) (100% - 100%)    Exam:   AAOx3  PERRL, EOMI, face symmetric, no tongue deviation  5/5 throughout, no pronator drift  Sensation intact to light touch throughout

## 2019-03-12 NOTE — PROGRESS NOTE ADULT - ASSESSMENT
33F s/p suboccipital craniotomy for resection of meningioma. Patient is doing well.    Q1 hour neurocheck  - continue EVD @ 20  - dex 4q6 (wc 11.7)- possible taper  - Na 136- eunatremic goal

## 2019-03-13 ENCOUNTER — APPOINTMENT (OUTPATIENT)
Dept: NEUROSURGERY | Facility: CLINIC | Age: 34
End: 2019-03-13

## 2019-03-13 PROCEDURE — 99292 CRITICAL CARE ADDL 30 MIN: CPT

## 2019-03-13 PROCEDURE — 99291 CRITICAL CARE FIRST HOUR: CPT

## 2019-03-13 PROCEDURE — 70450 CT HEAD/BRAIN W/O DYE: CPT | Mod: 26

## 2019-03-13 RX ORDER — MAGNESIUM SULFATE 500 MG/ML
2 VIAL (ML) INJECTION ONCE
Qty: 0 | Refills: 0 | Status: COMPLETED | OUTPATIENT
Start: 2019-03-13 | End: 2019-03-13

## 2019-03-13 RX ADMIN — POLYETHYLENE GLYCOL 3350 17 GRAM(S): 17 POWDER, FOR SOLUTION ORAL at 17:03

## 2019-03-13 RX ADMIN — Medication 4 MILLIGRAM(S): at 17:02

## 2019-03-13 RX ADMIN — Medication 4 MILLIGRAM(S): at 11:06

## 2019-03-13 RX ADMIN — Medication 100 MILLIGRAM(S): at 21:22

## 2019-03-13 RX ADMIN — Medication 100 MILLIGRAM(S): at 13:25

## 2019-03-13 RX ADMIN — Medication 50 GRAM(S): at 03:38

## 2019-03-13 RX ADMIN — ENOXAPARIN SODIUM 40 MILLIGRAM(S): 100 INJECTION SUBCUTANEOUS at 17:02

## 2019-03-13 RX ADMIN — OXYCODONE AND ACETAMINOPHEN 1 TABLET(S): 5; 325 TABLET ORAL at 08:15

## 2019-03-13 RX ADMIN — OXYCODONE AND ACETAMINOPHEN 2 TABLET(S): 5; 325 TABLET ORAL at 12:40

## 2019-03-13 RX ADMIN — SENNA PLUS 2 TABLET(S): 8.6 TABLET ORAL at 21:22

## 2019-03-13 RX ADMIN — OXYCODONE AND ACETAMINOPHEN 2 TABLET(S): 5; 325 TABLET ORAL at 21:28

## 2019-03-13 RX ADMIN — Medication 4 MILLIGRAM(S): at 05:11

## 2019-03-13 RX ADMIN — Medication 100 MILLIGRAM(S): at 05:11

## 2019-03-13 RX ADMIN — OXYCODONE AND ACETAMINOPHEN 1 TABLET(S): 5; 325 TABLET ORAL at 07:43

## 2019-03-13 RX ADMIN — OXYCODONE AND ACETAMINOPHEN 2 TABLET(S): 5; 325 TABLET ORAL at 11:56

## 2019-03-13 RX ADMIN — LACOSAMIDE 100 MILLIGRAM(S): 50 TABLET ORAL at 05:11

## 2019-03-13 RX ADMIN — OXYCODONE AND ACETAMINOPHEN 2 TABLET(S): 5; 325 TABLET ORAL at 22:00

## 2019-03-13 RX ADMIN — CHLORHEXIDINE GLUCONATE 1 APPLICATION(S): 213 SOLUTION TOPICAL at 21:22

## 2019-03-13 RX ADMIN — POLYETHYLENE GLYCOL 3350 17 GRAM(S): 17 POWDER, FOR SOLUTION ORAL at 05:11

## 2019-03-13 RX ADMIN — FAMOTIDINE 20 MILLIGRAM(S): 10 INJECTION INTRAVENOUS at 11:06

## 2019-03-13 RX ADMIN — Medication 4 MILLIGRAM(S): at 00:50

## 2019-03-13 RX ADMIN — LACOSAMIDE 100 MILLIGRAM(S): 50 TABLET ORAL at 17:02

## 2019-03-13 NOTE — PROGRESS NOTE ADULT - ASSESSMENT
33F s/p suboccipital craniotomy for resection of meningioma. Patient is doing well.    Q1 hour neurocheck  - continue EVD @ clamped  - dex 4q6 (wc 11.7)- possible taper  - Na 135- eunatremic goal

## 2019-03-13 NOTE — PROGRESS NOTE ADULT - ASSESSMENT
ASSESSMENT:  32 yo with left posterior fossa mass, most likely meningeoma, s/p craniotomy POD # 5.   EVD clamped 3/12    Neuro:  CT head tomorrow as she has small IPH  MRI brain wwo in few days  decadron for vasogenic edema   EVD at 20 cm water; clamp at 6pm per neurousrgery  xanax for anxiety   PT/OT  change keprpa to vimpat as she has lots of anxiety     Respiratory: RA  CV:SBP goal 100-150 mmhg  d/c a line     GI: advance diet as tolerated     Renal: IVL     Endocrine: finger sticks q 6 hrs, ISS     Heme/Onc:   cbc, INR           DVT ppx: SCD, start lovenox    ID: afebrile, mery-op ancef     Social/Family: updated at bedside   Discharge planning: ICU     Code Status: [x] Full Code [] DNR [] DNI [] Goals of Care:   Disposition: [x] ICU [] Stroke Unit [] RCU []PCU []Floor [] Discharge Home     Patient at high risk for neurologic deterioration, critical care time, excluding procedures: 30 minutes ASSESSMENT:  34 yo with left posterior fossa mass, most likely meningeoma, s/p craniotomy POD # 4.   EVD clamped 3/12    Neuro:  CT head - increased ventricular size  Will check w/ NSGY regarding EVD  MRI brain wwo in few days  decadron for vasogenic edema   Cont Vimpat 100 BID  xanax for anxiety   PT/OT    Respiratory: RA  CV:SBP goal 100-150 mmhg  d/c a line     GI: advance diet as tolerated     Renal: IVL     Endocrine: finger sticks q 6 hrs, ISS     Heme/Onc:   cbc, INR           DVT ppx: SCD, on lovenox    ID: afebrile, mery-op ancef     Social/Family: updated at bedside   Discharge planning: ICU     Code Status: [x] Full Code [] DNR [] DNI [] Goals of Care:   Disposition: [x] ICU [] Stroke Unit [] RCU []PCU []Floor [] Discharge Home     Patient at high risk for neurologic deterioration, critical care time, excluding procedures: 30 minutes ASSESSMENT:  34 yo with left posterior fossa mass, most likely meningeoma, s/p craniotomy POD # 4.   EVD clamped 3/12    Neuro:  CT head - slightly increased ventricular size  Will check w/ NSGY regarding EVD  MRI brain wwo in few days  decadron for vasogenic edema   Cont Vimpat 100 BID  xanax for anxiety   PT/OT    Respiratory: RA  CV:SBP goal 100-150 mmhg  d/c a line     GI: advance diet as tolerated     Renal: IVL     Endocrine: finger sticks q 6 hrs, ISS     Heme/Onc:   cbc, INR           DVT ppx: SCD, on lovenox    ID: afebrile, mery-op ancef     Social/Family: updated at bedside   Discharge planning: ICU     Code Status: [x] Full Code [] DNR [] DNI [] Goals of Care:   Disposition: [x] ICU [] Stroke Unit [] RCU []PCU []Floor [] Discharge Home     Patient at high risk for neurologic deterioration, critical care time, excluding procedures: 30 minutes ASSESSMENT:  32 yo with left posterior fossa mass, most likely meningeoma, s/p craniotomy POD # 4.   EVD clamped 3/12    Neuro:  CT head - slightly increased ventricular size. However, will DC EVD.  MRI brain wwo in few days  decadron for vasogenic edema   Cont Vimpat 100 BID  xanax for anxiety   PT/OT    Respiratory: RA  CV:SBP goal 100-150 mmhg  d/c a line     GI: advance diet as tolerated     Renal: IVL     Endocrine: finger sticks q 6 hrs, ISS     Heme/Onc:   cbc, INR           DVT ppx: SCD, on lovenox    ID: afebrile, mery-op ancef     Social/Family: updated at bedside   Discharge planning: ICU     Code Status: [x] Full Code [] DNR [] DNI [] Goals of Care:   Disposition: [x] ICU [] Stroke Unit [] RCU []PCU []Floor [] Discharge Home     Patient at high risk for neurologic deterioration, critical care time, excluding procedures: 30 minutes

## 2019-03-13 NOTE — PROGRESS NOTE ADULT - SUBJECTIVE AND OBJECTIVE BOX
Vital Signs Last 24 Hrs  T(C): 36.7 (12 Mar 2019 23:00), Max: 36.9 (12 Mar 2019 07:00)  T(F): 98 (12 Mar 2019 23:00), Max: 98.4 (12 Mar 2019 07:00)  HR: 64 (12 Mar 2019 23:00) (64 - 89)  BP: 107/66 (12 Mar 2019 23:00) (107/66 - 123/73)  BP(mean): 78 (12 Mar 2019 23:00) (78 - 95)  RR: 14 (12 Mar 2019 23:00) (13 - 17)  SpO2: 98% (12 Mar 2019 23:00) (98% - 100%)    Exam:   AOx3, FC, PERRL, EOMI, no facial   5/5 throughout, no drift  SILT  no clonus

## 2019-03-13 NOTE — PROGRESS NOTE ADULT - ASSESSMENT
Meningioma, post-operative day 4  - CT Head post drain pull; monitor for posterior fossa edema  - steroids for brain edema  - pain control  - Xanax for anxiety    40 minutes critical care time

## 2019-03-13 NOTE — PROGRESS NOTE ADULT - ASSESSMENT
Meningioma, post-operative day 4  - external ventricular drain for posterior fossa compression and risk for hydrocephalus   - CT head in the AM   - steroids for cerebral edema  - pain control  - Xanax for anxiety    40 minutes critical care time

## 2019-03-13 NOTE — PROGRESS NOTE ADULT - SUBJECTIVE AND OBJECTIVE BOX
SUMMARY: 33 year old woman with PMH Migraines and anxiety who presented w/ worsening headaches and vomiting and found to have a meningioma s/p L SOC craniectomy for meningioma resection (3/8).    ADMISSION SCORES:  GCS: 15    HOSPITAL COURSE:  3/8: s/p L SOC craniectomy  3/12: EVD Clamped.    OVERNIGHT EVENTS: Neurologically stable.     Vitals/labs/meds/imaging reviewed    PHYSICAL EXAM:    General: No Acute Distress   Neurological: Awake, alert oriented to person, place and time, Following Commands, PERRL, EOMI, Face Symmetrical, Speech Fluent, Moving all extremities, Muscle Strength normal in all four extremities, No Drift, Sensation to Light Touch Intact  Pulmonary: Clear to Auscultation, No Rales, No Rhonchi, No Wheezes   Cardiovascular: S1, S2, Regular Rate and Rhythm   Gastrointestinal: Soft, Nontender, Nondistended   Extremities: No calf tenderness

## 2019-03-13 NOTE — CHART NOTE - NSCHARTNOTEFT_GEN_A_CORE
ALISTAIR MOBLEY [id number]      Drain type: []SD []SG [] SHINE [] HMV [] Lumbar drain [x] EVD [] ICP [place] [] Abd drain [] Central Line _________    Patient's position while drain removed: Supine    [x] Patient tolerated well [x] No complications [] complications:     Exit Site secured with: [x] _2_ staples [] __ suture (please specify how many of each): [] occlusive dressing    Additional Info:

## 2019-03-14 ENCOUNTER — TRANSCRIPTION ENCOUNTER (OUTPATIENT)
Age: 34
End: 2019-03-14

## 2019-03-14 VITALS
TEMPERATURE: 98 F | RESPIRATION RATE: 20 BRPM | SYSTOLIC BLOOD PRESSURE: 108 MMHG | HEART RATE: 75 BPM | OXYGEN SATURATION: 100 % | DIASTOLIC BLOOD PRESSURE: 66 MMHG

## 2019-03-14 DIAGNOSIS — F41.9 ANXIETY DISORDER, UNSPECIFIED: ICD-10-CM

## 2019-03-14 PROBLEM — J45.20 MILD INTERMITTENT ASTHMA, UNCOMPLICATED: Chronic | Status: ACTIVE | Noted: 2019-03-08

## 2019-03-14 PROCEDURE — 81025 URINE PREGNANCY TEST: CPT

## 2019-03-14 PROCEDURE — 83605 ASSAY OF LACTIC ACID: CPT

## 2019-03-14 PROCEDURE — 88360 TUMOR IMMUNOHISTOCHEM/MANUAL: CPT

## 2019-03-14 PROCEDURE — 82330 ASSAY OF CALCIUM: CPT

## 2019-03-14 PROCEDURE — 83735 ASSAY OF MAGNESIUM: CPT

## 2019-03-14 PROCEDURE — 99024 POSTOP FOLLOW-UP VISIT: CPT

## 2019-03-14 PROCEDURE — 88341 IMHCHEM/IMCYTCHM EA ADD ANTB: CPT

## 2019-03-14 PROCEDURE — 88307 TISSUE EXAM BY PATHOLOGIST: CPT

## 2019-03-14 PROCEDURE — C1769: CPT

## 2019-03-14 PROCEDURE — 84295 ASSAY OF SERUM SODIUM: CPT

## 2019-03-14 PROCEDURE — 70450 CT HEAD/BRAIN W/O DYE: CPT

## 2019-03-14 PROCEDURE — 70450 CT HEAD/BRAIN W/O DYE: CPT | Mod: 26

## 2019-03-14 PROCEDURE — C1729: CPT

## 2019-03-14 PROCEDURE — C1763: CPT

## 2019-03-14 PROCEDURE — 88342 IMHCHEM/IMCYTCHM 1ST ANTB: CPT

## 2019-03-14 PROCEDURE — 84132 ASSAY OF SERUM POTASSIUM: CPT

## 2019-03-14 PROCEDURE — 97116 GAIT TRAINING THERAPY: CPT

## 2019-03-14 PROCEDURE — C1889: CPT

## 2019-03-14 PROCEDURE — 93970 EXTREMITY STUDY: CPT

## 2019-03-14 PROCEDURE — 82803 BLOOD GASES ANY COMBINATION: CPT

## 2019-03-14 PROCEDURE — 85014 HEMATOCRIT: CPT

## 2019-03-14 PROCEDURE — 84100 ASSAY OF PHOSPHORUS: CPT

## 2019-03-14 PROCEDURE — 82947 ASSAY GLUCOSE BLOOD QUANT: CPT

## 2019-03-14 PROCEDURE — 85027 COMPLETE CBC AUTOMATED: CPT

## 2019-03-14 PROCEDURE — 82435 ASSAY OF BLOOD CHLORIDE: CPT

## 2019-03-14 PROCEDURE — 80048 BASIC METABOLIC PNL TOTAL CA: CPT

## 2019-03-14 PROCEDURE — C1713: CPT

## 2019-03-14 PROCEDURE — 97161 PT EVAL LOW COMPLEX 20 MIN: CPT

## 2019-03-14 RX ORDER — DEXAMETHASONE 0.5 MG/5ML
1 ELIXIR ORAL
Qty: 12 | Refills: 0
Start: 2019-03-14 | End: 2019-03-17

## 2019-03-14 RX ORDER — ALPRAZOLAM 0.25 MG
0.5 TABLET ORAL
Qty: 0 | Refills: 0 | COMMUNITY

## 2019-03-14 RX ORDER — ACETAMINOPHEN 500 MG
975 TABLET ORAL EVERY 6 HOURS
Qty: 0 | Refills: 0 | Status: DISCONTINUED | OUTPATIENT
Start: 2019-03-14 | End: 2019-03-14

## 2019-03-14 RX ORDER — DEXAMETHASONE 0.5 MG/5ML
1 ELIXIR ORAL
Qty: 0 | Refills: 0 | COMMUNITY

## 2019-03-14 RX ORDER — OXYCODONE HYDROCHLORIDE 5 MG/1
1 TABLET ORAL
Qty: 20 | Refills: 0
Start: 2019-03-14

## 2019-03-14 RX ORDER — DOCUSATE SODIUM 100 MG
1 CAPSULE ORAL
Qty: 0 | Refills: 0 | COMMUNITY

## 2019-03-14 RX ORDER — ALPRAZOLAM 0.25 MG
0.5 TABLET ORAL
Qty: 14 | Refills: 0
Start: 2019-03-14

## 2019-03-14 RX ORDER — LACOSAMIDE 50 MG/1
1 TABLET ORAL
Qty: 60 | Refills: 0
Start: 2019-03-14

## 2019-03-14 RX ORDER — OXYCODONE HYDROCHLORIDE 5 MG/1
5 TABLET ORAL EVERY 4 HOURS
Qty: 0 | Refills: 0 | Status: DISCONTINUED | OUTPATIENT
Start: 2019-03-14 | End: 2019-03-14

## 2019-03-14 RX ADMIN — POLYETHYLENE GLYCOL 3350 17 GRAM(S): 17 POWDER, FOR SOLUTION ORAL at 05:23

## 2019-03-14 RX ADMIN — Medication 975 MILLIGRAM(S): at 10:30

## 2019-03-14 RX ADMIN — FAMOTIDINE 20 MILLIGRAM(S): 10 INJECTION INTRAVENOUS at 11:44

## 2019-03-14 RX ADMIN — Medication 4 MILLIGRAM(S): at 00:08

## 2019-03-14 RX ADMIN — Medication 4 MILLIGRAM(S): at 11:44

## 2019-03-14 RX ADMIN — LACOSAMIDE 100 MILLIGRAM(S): 50 TABLET ORAL at 05:23

## 2019-03-14 RX ADMIN — Medication 100 MILLIGRAM(S): at 05:23

## 2019-03-14 RX ADMIN — Medication 975 MILLIGRAM(S): at 10:00

## 2019-03-14 RX ADMIN — Medication 4 MILLIGRAM(S): at 05:23

## 2019-03-14 NOTE — DISCHARGE NOTE PROVIDER - NSDCACTIVITY_GEN_ALL_CORE
Do not drive or operate machinery/Walking - Indoors allowed/No heavy lifting/straining/Stairs allowed/Do not make important decisions

## 2019-03-14 NOTE — DISCHARGE NOTE NURSING/CASE MANAGEMENT/SOCIAL WORK - NSDCDPATPORTLINK_GEN_ALL_CORE
You can access the [facility] Portal, offered by [facility], by registering with the following website: [email or web address]

## 2019-03-14 NOTE — PROGRESS NOTE ADULT - SUBJECTIVE AND OBJECTIVE BOX
Vital Signs Last 24 Hrs  T(C): 36.8 (13 Mar 2019 19:00), Max: 36.8 (13 Mar 2019 07:00)  T(F): 98.3 (13 Mar 2019 19:00), Max: 98.3 (13 Mar 2019 19:00)  HR: 75 (13 Mar 2019 19:00) (58 - 75)  BP: 116/60 (13 Mar 2019 19:00) (108/71 - 120/71)  BP(mean): 75 (13 Mar 2019 19:00) (75 - 86)  RR: 17 (13 Mar 2019 19:00) (15 - 17)  SpO2: 99% (13 Mar 2019 19:00) (98% - 99%)    Exam:   AOx3, FC, PERRL, EOMI, no facial   5/5 throughout, no drift  SILT  no clonus

## 2019-03-14 NOTE — PROGRESS NOTE ADULT - ASSESSMENT
ASSESSMENT:  34 yo with left posterior fossa mass, most likely meningeoma, s/p craniotomy POD # 4.   EVD d/c'ed 3/13    Neuro:  Post pull CT this AM  MRI brain wwo in few days  decadron for vasogenic edema   Cont Vimpat 100 BID  xanax for anxiety   PT/OT    Respiratory: RA  CV:SBP goal 100-150 mmhg  d/c a line     GI: advance diet as tolerated     Renal: IVL     Endocrine: finger sticks q 6 hrs, ISS     Heme/Onc:   cbc, INR           DVT ppx: SCD, on lovenox    ID: afebrile, mery-op ancef     Social/Family: updated at bedside   Discharge planning: ICU     Code Status: [x] Full Code [] DNR [] DNI [] Goals of Care:   Disposition: [x] ICU [] Stroke Unit [] RCU []PCU []Floor [] Discharge Home     Patient at high risk for neurologic deterioration, critical care time, excluding procedures: 30 minutes ASSESSMENT:  34 yo with left posterior fossa mass, most likely meningeoma, s/p craniotomy POD # 5.    EVD d/c'ed 3/13    Neuro:  Post pull CT this AM - no hemorrhage  MRI brain wwo in few days  decadron for vasogenic edema   Cont Vimpat 100 BID  xanax for anxiety   PT/OT    Respiratory: RA     CV: SBP goal 100-150 mmhg      GI: Regular diet    Renal: IVL     Endocrine: finger sticks q 6 hrs, ISS     Heme/Onc:   cbc, INR           DVT ppx: SCD, on lovenox    ID: afebrile, mery-op ancef     Social/Family: updated at bedside   Discharge planning: Floor vs DC home    Code Status: [x] Full Code [] DNR [] DNI [] Goals of Care:   Disposition: [x] ICU [] Stroke Unit [] RCU []PCU []Floor [] Discharge Home     Patient at high risk for neurologic deterioration, critical care time, excluding procedures: 30 minutes ASSESSMENT:  34 yo with left posterior fossa mass, most likely meningeoma, s/p craniotomy POD # 5.    EVD d/c'ed 3/13    Neuro:  Post pull CT this AM - no hemorrhage  MRI brain wwo in few days  decadron for vasogenic edema   Cont Vimpat 100 BID  xanax for anxiety   PT/OT    Respiratory: RA     CV: SBP goal 100-150 mmhg      GI: Regular diet    Renal: IVL     Endocrine: finger sticks q 6 hrs, ISS     Heme/Onc:   cbc, INR           DVT ppx: SCD, on lovenox    ID: afebrile, mery-op ancef     Social/Family: updated at bedside   Discharge planning: Floor vs DC home    Code Status: [x] Full Code [] DNR [] DNI [] Goals of Care:   Disposition: [] ICU [] Stroke Unit [] RCU []PCU [x]Floor vs [x] Discharge Home     not critically ill

## 2019-03-14 NOTE — PROGRESS NOTE ADULT - SUBJECTIVE AND OBJECTIVE BOX
SUMMARY: 33 year old woman with PMH Migraines and anxiety who presented w/ worsening headaches and vomiting and found to have a meningioma s/p L SOC craniectomy for meningioma resection (3/8).    ADMISSION SCORES:  GCS: 15    HOSPITAL COURSE:  3/8: s/p L SOC craniectomy  3/12: EVD Clamped.  3/13: EVD d/c'ed.     OVERNIGHT EVENTS: Neurologically stable.     Vitals/labs/meds/imaging reviewed    PHYSICAL EXAM:    General: No Acute Distress   Neurological: Awake, alert oriented to person, place and time, Following Commands, PERRL, EOMI, Face Symmetrical, Speech Fluent, Moving all extremities, Muscle Strength normal in all four extremities, No Drift, Sensation to Light Touch Intact  Pulmonary: Clear to Auscultation, No Rales, No Rhonchi, No Wheezes   Cardiovascular: S1, S2, Regular Rate and Rhythm   Gastrointestinal: Soft, Nontender, Nondistended   Extremities: No calf tenderness SUMMARY: 33 year old woman with PMH Migraines and anxiety who presented w/ worsening headaches and vomiting and found to have a meningioma s/p L SOC craniectomy for meningioma resection (3/8).    ADMISSION SCORES:  GCS: 15    HOSPITAL COURSE:  3/8: s/p L SOC craniectomy  3/12: EVD Clamped.  3/13: EVD d/c'ed.     OVERNIGHT EVENTS: Neurologically stable. Patient requesting she would like to go home instead of the floor.     Vitals/labs/meds/imaging reviewed    PHYSICAL EXAM:    General: No Acute Distress   Neurological: Awake, alert oriented to person, place and time, Following Commands, PERRL, EOMI, Face Symmetrical, Speech Fluent, Moving all extremities, Muscle Strength normal in all four extremities, No Drift, Sensation to Light Touch Intact  Pulmonary: Clear to Auscultation, No Rales, No Rhonchi, No Wheezes   Cardiovascular: S1, S2, Regular Rate and Rhythm   Gastrointestinal: Soft, Nontender, Nondistended   Extremities: No calf tenderness

## 2019-03-14 NOTE — DISCHARGE NOTE PROVIDER - CARE PROVIDER_API CALL
Thierry Garner)  Neurological Surgery  [address] [place] [address]  Phone: [phone]  Fax: [phone]  Follow Up Time:

## 2019-03-14 NOTE — PROGRESS NOTE ADULT - PROVIDER SPECIALTY LIST ADULT
NSICU
Neurosurgery

## 2019-03-14 NOTE — PROGRESS NOTE ADULT - ASSESSMENT
33F s/p suboccipital craniotomy for resection of meningioma. Patient is doing well.    Q1 hour neurocheck  - CTH post pull  - dex 4q6 (wc 11.7)- possible taper  - Na 135- eunatremic goal

## 2019-03-14 NOTE — DISCHARGE NOTE PROVIDER - NSDCCPCAREPLAN_GEN_ALL_CORE_FT
PRINCIPAL DISCHARGE DIAGNOSIS  Diagnosis: Status post resection of meningioma  Assessment and Plan of Treatment: continue vimpat, taper off of decdron      SECONDARY DISCHARGE DIAGNOSES  Diagnosis: Anxiety  Assessment and Plan of Treatment: continue xanax, follow up with PMD

## 2019-03-14 NOTE — DISCHARGE NOTE PROVIDER - HOSPITAL COURSE
34 yo female admitted electively for menigioma resection.  Patient went to OR on 3/9/19 for Left sub occipital craniotomy for resection of mass.  An EVD was placed intra-operatively, which was managed post op in the Neuro ICU.  Post op CT scan showed small amount of hemorrhage in post op bed.  The EVD was removed on 3/13/19, a repeat CT was done 3/14 and was stable.  The patient also has anxiety, which was managed with xanax prn.  Patient prophylactically placed on vimpat for seizures.  She has been cleared by PT for discharge home, she will remain on vimpat and be tappered off decadron.

## 2019-03-15 NOTE — DISCUSSION/SUMMARY
[Home] : patient was discharged to home [FreeTextEntry1] : "Reason for Admission	meningioma resection\par \par Hospital Course	\par 32 yo female admitted electively for menigioma resection.  Patient went to OR on 3/9/19 for Left sub occipital craniotomy for resection of mass.  An EVD was placed intra-operatively, which was managed post op in the Neuro ICU.  Post op CT scan showed small amount of hemorrhage in post op bed.  The EVD was removed on 3/13/19, a repeat CT was done 3/14 and was stable.  The patient also has anxiety, which was managed with xanax prn.  Patient prophylactically placed on vimpat for seizures.  She has been cleared by PT for discharge home, she will remain on vimpat and be tappered off decadron.  \par \par \par Care Plan/Procedures:\par Discharge Diagnoses, Assessment and Plan of Treatment	PRINCIPAL DISCHARGE DIAGNOSIS\par Diagnosis: Status post resection of meningioma\par Assessment and Plan of Treatment: continue vimpat, taper off of decdron\par \par \par SECONDARY DISCHARGE DIAGNOSES\par Diagnosis: Anxiety\par Assessment and Plan of Treatment: continue xanax, follow up with PMD\par Discharge Procedures, Findings and Treatment	PRINCIPAL PROCEDURE\par Procedure: Craniotomy for meningioma"\par \par \par  [FreeTextEntry3] : Spoke with patient over the phone. Patient states that she "is doing well and everything is going as expected". Patient states that she has not needed to take Xanax in the past three days. She has  a f/u appointment with the Surgeon Dr. Garner on 3/20/2019. Patient made aware to call surgeon or PCP office if patient has any concerns or issues. Patient does not want to f/u with the PCP office, stating "I do not want to go to any office I do not need to yet because I am afraid that I might catch something".

## 2019-03-18 RX ORDER — DEXAMETHASONE 0.5 MG/5ML
1 ELIXIR ORAL
Qty: 5 | Refills: 0
Start: 2019-03-18 | End: 2019-03-22

## 2019-03-20 ENCOUNTER — APPOINTMENT (OUTPATIENT)
Dept: NEUROSURGERY | Facility: CLINIC | Age: 34
End: 2019-03-20
Payer: COMMERCIAL

## 2019-03-20 PROCEDURE — 99024 POSTOP FOLLOW-UP VISIT: CPT

## 2019-04-01 ENCOUNTER — FORM ENCOUNTER (OUTPATIENT)
Age: 34
End: 2019-04-01

## 2019-04-02 ENCOUNTER — APPOINTMENT (OUTPATIENT)
Age: 34
End: 2019-04-02
Payer: COMMERCIAL

## 2019-04-02 ENCOUNTER — OUTPATIENT (OUTPATIENT)
Dept: OUTPATIENT SERVICES | Facility: HOSPITAL | Age: 34
LOS: 1 days | End: 2019-04-02
Payer: COMMERCIAL

## 2019-04-02 DIAGNOSIS — Z00.8 ENCOUNTER FOR OTHER GENERAL EXAMINATION: ICD-10-CM

## 2019-04-02 DIAGNOSIS — Z98.89 OTHER SPECIFIED POSTPROCEDURAL STATES: Chronic | ICD-10-CM

## 2019-04-02 PROCEDURE — 70553 MRI BRAIN STEM W/O & W/DYE: CPT

## 2019-04-02 PROCEDURE — 70553 MRI BRAIN STEM W/O & W/DYE: CPT | Mod: 26

## 2019-04-02 PROCEDURE — A9585: CPT

## 2019-04-03 ENCOUNTER — APPOINTMENT (OUTPATIENT)
Dept: NEUROSURGERY | Facility: CLINIC | Age: 34
End: 2019-04-03
Payer: COMMERCIAL

## 2019-04-03 DIAGNOSIS — Z87.09 PERSONAL HISTORY OF OTHER DISEASES OF THE RESPIRATORY SYSTEM: ICD-10-CM

## 2019-04-03 PROCEDURE — 99024 POSTOP FOLLOW-UP VISIT: CPT

## 2019-04-04 VITALS
SYSTOLIC BLOOD PRESSURE: 109 MMHG | OXYGEN SATURATION: 99 % | RESPIRATION RATE: 18 BRPM | DIASTOLIC BLOOD PRESSURE: 67 MMHG | HEART RATE: 75 BPM | TEMPERATURE: 98.2 F

## 2019-04-04 PROBLEM — Z87.09 HISTORY OF ASTHMA: Status: RESOLVED | Noted: 2018-12-22 | Resolved: 2019-04-04

## 2019-04-04 NOTE — REASON FOR VISIT
[Family Member] : family member [de-identified] : left suboccipital craniotomy for resection of meningioma [de-identified] : 3/9/2019

## 2019-04-04 NOTE — HISTORY OF PRESENT ILLNESS
[FreeTextEntry1] : Ms. ALISTAIR MOBLEY is a 34 yo female with recent MRI finding of a large left posterior fossa meningioma during migraine evaluation. On 3/9/19, she underwent left suboccipital craniotomy for resection with EVD placement which was subsequently removed. Post operative course was uneventful and repeat CT head on 3/14 was stable. Final patho result showed WHO grade I meningioma. She was discharged to home on 3/14 with Decadron taper and Vimpat for seizure prophylaxis. \par Today she returns for one month post op evaluation and states she is doing well at home. She finished course of vimpat/decadrone taper about a week ago. Her headache/nausea/vomiting almost completely resolved after the surgery and denies dizziness, visual changes, weakness, fever/chills, seizure activity or any other focal neurologic deficits. Surgical incision appears to be healing well. Repeat MRI on 4/2 showed good post op resection without evidence of residual/hemorrhage/hydrocephalus. \par She has been doing home exercise (eliptical) daily for 30 mins without difficulty.

## 2019-04-04 NOTE — PHYSICAL EXAM
[General Appearance - Alert] : alert [General Appearance - In No Acute Distress] : in no acute distress [General Appearance - Well Nourished] : well nourished [General Appearance - Well-Appearing] : healthy appearing [Longitudinal] : longitudinal [Clean] : clean [Dry] : dry [Healing Well] : healing well [No Drainage] : without drainage [Normal Skin] : normal [Oriented To Time, Place, And Person] : oriented to person, place, and time [Impaired Insight] : insight and judgment were intact [Affect] : the affect was normal [Memory Recent] : recent memory was not impaired [Person] : oriented to person [Place] : oriented to place [Time] : oriented to time [Short Term Intact] : short term memory intact [Remote Intact] : remote memory intact [Registration Intact] : recent registration memory intact [Span Intact] : the attention span was normal [Concentration Intact] : normal concentrating ability [Fluency] : fluency intact [Comprehension] : comprehension intact [Current Events] : adequate knowledge of current events [Past History] : adequate knowledge of personal past history [Vocabulary] : adequate range of vocabulary [I: Normal Smell] : smell intact bilaterally [Cranial Nerves Optic (II)] : visual acuity intact bilaterally,  pupils equal round and reactive to light [Cranial Nerves Oculomotor (III)] : extraocular motion intact [Cranial Nerves Trigeminal (V)] : facial sensation intact symmetrically [Cranial Nerves Facial (VII)] : face symmetrical [Cranial Nerves Vestibulocochlear (VIII)] : hearing was intact bilaterally [Cranial Nerves Glossopharyngeal (IX)] : tongue and palate midline [Cranial Nerves Accessory (XI - Cranial And Spinal)] : head turning and shoulder shrug symmetric [Cranial Nerves Hypoglossal (XII)] : there was no tongue deviation with protrusion [Motor Strength] : muscle strength was normal in all four extremities [5] : S1 toe walking 5/5 [Sensation Tactile Decrease] : light touch was intact [Sensation Pain / Temperature Decrease] : pain and temperature was intact [Balance] : balance was intact [2+] : Ankle jerk left 2+ [No Visual Abnormalities] : no visible abnormailities [Normal] : normal [Able to toe walk] : the patient was able to toe walk [Able to heel walk] : the patient was able to heel walk [Sclera] : the sclera and conjunctiva were normal [PERRL With Normal Accommodation] : pupils were equal in size, round, reactive to light, with normal accommodation [Extraocular Movements] : extraocular movements were intact [Outer Ear] : the ears and nose were normal in appearance [Hearing Threshold Finger Rub Not Comal] : hearing was normal [Examination Of The Oral Cavity] : the lips and gums were normal [Neck Appearance] : the appearance of the neck was normal [Neck Cervical Mass (___cm)] : no neck mass was observed [] : no respiratory distress [Exaggerated Use Of Accessory Muscles For Inspiration] : no accessory muscle use [Edema] : there was no peripheral edema [No CVA Tenderness] : no ~M costovertebral angle tenderness [No Spinal Tenderness] : no spinal tenderness [Abnormal Walk] : normal gait [Motor Tone] : muscle strength and tone were normal [Erythema] : not erythematous [Tender] : not tender [Warm] : not warm [Indurated] : not indurated [Fluctuant] : not fluctuant [Over the Past 2 Weeks, Have You Felt Down, Depressed, or Hopeless?] : 1.) Over the past 2 weeks, have you felt down, depressed, or hopeless? No [Over the Past 2 Weeks, Have You Felt Little Interest or Pleasure Doing Things?] : 2.) Over the past 2 weeks, have you felt little interest or pleasure doing things? No [FreeTextEntry1] : denies suicidal ideation [Romberg's Sign] : Romberg's sign was negtive [Lira] : Lira's sign was not demonstrated

## 2019-04-09 ENCOUNTER — APPOINTMENT (OUTPATIENT)
Dept: SURGERY | Facility: CLINIC | Age: 34
End: 2019-04-09
Payer: COMMERCIAL

## 2019-04-09 VITALS
BODY MASS INDEX: 27.88 KG/M2 | HEART RATE: 78 BPM | RESPIRATION RATE: 15 BRPM | SYSTOLIC BLOOD PRESSURE: 116 MMHG | DIASTOLIC BLOOD PRESSURE: 62 MMHG | OXYGEN SATURATION: 98 % | WEIGHT: 142 LBS | TEMPERATURE: 98.2 F | HEIGHT: 60 IN

## 2019-04-09 PROCEDURE — 99244 OFF/OP CNSLTJ NEW/EST MOD 40: CPT

## 2019-04-09 RX ORDER — ALPRAZOLAM 0.25 MG/1
0.25 TABLET ORAL 4 TIMES DAILY
Qty: 10 | Refills: 0 | Status: DISCONTINUED | COMMUNITY
Start: 2019-03-06 | End: 2019-04-09

## 2019-04-09 RX ORDER — FAMOTIDINE 40 MG/1
40 TABLET, FILM COATED ORAL TWICE DAILY
Qty: 20 | Refills: 0 | Status: DISCONTINUED | COMMUNITY
Start: 2019-03-06 | End: 2019-04-09

## 2019-04-09 RX ORDER — DEXAMETHASONE 4 MG/1
4 TABLET ORAL
Qty: 20 | Refills: 0 | Status: DISCONTINUED | COMMUNITY
Start: 2019-03-06 | End: 2019-04-09

## 2019-04-09 RX ORDER — HYDROXYZINE PAMOATE 25 MG/1
25 CAPSULE ORAL EVERY 6 HOURS
Qty: 120 | Refills: 2 | Status: DISCONTINUED | COMMUNITY
Start: 2019-03-05 | End: 2019-04-09

## 2019-04-09 NOTE — CONSULT LETTER
[Consult Letter:] : I had the pleasure of evaluating your patient, [unfilled]. [Dear  ___] : Dear  [unfilled], [Please see my note below.] : Please see my note below. [Consult Closing:] : Thank you very much for allowing me to participate in the care of this patient.  If you have any questions, please do not hesitate to contact me. [Sincerely,] : Sincerely, [FreeTextEntry3] : I have reviewed all the documentation for this encounter with the patient and have edited where appropriate\par \par Dr. John Sheikh

## 2019-04-09 NOTE — PHYSICAL EXAM
[JVD] : no jugular venous distention  [Abdominal Masses] : No abdominal masses [Abdomen Tenderness] : ~T ~M No abdominal tenderness [No HSM] : no hepatosplenomegaly [No Rash or Lesion] : No rash or lesion [Oriented to Person] : oriented to person [Alert] : alert [Oriented to Place] : oriented to place [Oriented to Time] : oriented to time [Calm] : calm [de-identified] : Well-developed well-nourished white female in no acute. [de-identified] : wnl [de-identified] : No groin hernias were appreciated. [de-identified] : Normal strength and gait. Examination of both groins fails to reveal any obvious lymphadenopathy. I confirmed this with the patient as she no longer can feel the lump that she previously felt.

## 2019-04-09 NOTE — ASSESSMENT
[FreeTextEntry1] : I discussed with the patient that there is no indication to do a lymph node biopsy in as I could not feel any specific lymph node to go after and the patient states that then lymph node that she was concerned about is nolonhg palpable.

## 2019-04-09 NOTE — HISTORY OF PRESENT ILLNESS
[de-identified] : Patient was in her usual state of health when back in December she noted a swelling in her right groin this has been persistent to January and February. The patient states that the lump was nontender. Did not vary in size. The patient denies any fever chills night sweats etc. Recently the patient is than the lump has gotten much smaller. The patient had a CT scan of the abdomen and pelvis which showed some very small groin lymph nodes. The patient at the present time she should no longer is able to feel the lump. I reviewed the CT images with the patient.

## 2019-04-30 ENCOUNTER — FORM ENCOUNTER (OUTPATIENT)
Age: 34
End: 2019-04-30

## 2019-05-01 ENCOUNTER — APPOINTMENT (OUTPATIENT)
Dept: NEUROSURGERY | Facility: CLINIC | Age: 34
End: 2019-05-01
Payer: COMMERCIAL

## 2019-05-01 ENCOUNTER — OUTPATIENT (OUTPATIENT)
Dept: OUTPATIENT SERVICES | Facility: HOSPITAL | Age: 34
LOS: 1 days | End: 2019-05-01
Payer: COMMERCIAL

## 2019-05-01 ENCOUNTER — APPOINTMENT (OUTPATIENT)
Dept: CT IMAGING | Facility: IMAGING CENTER | Age: 34
End: 2019-05-01
Payer: COMMERCIAL

## 2019-05-01 DIAGNOSIS — Z98.89 OTHER SPECIFIED POSTPROCEDURAL STATES: Chronic | ICD-10-CM

## 2019-05-01 DIAGNOSIS — D32.9 BENIGN NEOPLASM OF MENINGES, UNSPECIFIED: ICD-10-CM

## 2019-05-01 PROCEDURE — 70450 CT HEAD/BRAIN W/O DYE: CPT | Mod: 26

## 2019-05-01 PROCEDURE — 99024 POSTOP FOLLOW-UP VISIT: CPT

## 2019-05-01 PROCEDURE — 70450 CT HEAD/BRAIN W/O DYE: CPT

## 2019-05-01 NOTE — REASON FOR VISIT
[Follow-Up: _____] : a [unfilled] follow-up visit [FreeTextEntry1] : Ms. ALISTAIR MOBLEY is a 34 yo female with recent MRI finding of a large left posterior fossa meningioma during migraine evaluation. On 3/9/19, she underwent left suboccipital craniotomy for resection with EVD placement which was subsequently removed. Post operative course was uneventful and repeat CT head on 3/14 was stable. Final patho result showed WHO grade I meningioma. She was discharged to home on 3/14 with Decadron taper and Vimpat for seizure prophylaxis. \par \par Today she returns for 2 months follow up visit. She finished her course of vimpat/decadrone taper. Her headache/nausea/vomiting initially completely resolved after the surgery. 10 days ago she started having dizziness, fatigue which has persisted over last 10 days. She admits at times she has trouble concentrating on one object when looking at it. Denies tremors, shaking, lost of consciousness. Denies nausea/vomiting.  Surgical incision appears to be healing well. She admits to occasional clicking over the left craniotomy (which is a common finding). \par \par Medications: She is currently only taking sulfamethizole for a axillary infection (followed by dermatology)\par \par Her PCP is Madison Mcdaniel.  \par \par Her CTH today 5/1 was stable, no hemorrhage or evidence of hydrocephalus.\par \par Repeat MRI on 4/2 showed good post op resection without evidence of residual/hemorrhage/hydrocephalus. 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

## 2019-05-01 NOTE — ASSESSMENT
[FreeTextEntry1] : 33 year old female now 2 months postoperative from resection of left posterior fossa meningioma\par \par -CTH today stable, patient counseled regarding waxing / waning symptomalogy following posterior fossa cranial neurosurgery\par \par -Continue course off steroids and AEDs\par \par -MRI in 6 months time w/ w/o contrast with follow up at that time (October 2019)\par \par -May return to daily activities as tolerated, may return to work as tolerated

## 2019-05-01 NOTE — PHYSICAL EXAM
[FreeTextEntry1] : AAOX3\par PERRLA EOMI\par NO FACIAL ASYMMETRY\par NO HEARING LOSS\par TONGUE MIDLINE\par NO DYSMETRIA\par NO DIFFICULTY WALKING OR TANDEM GAIT\par 5/5 UE AND LE\par REFLEXES +2 B/L UPPERS AND LOWERS\par \par \par INCISION WELL HEALED, MILD AMOUNT ALOPECIA AROUND INCISION

## 2019-05-17 ENCOUNTER — APPOINTMENT (OUTPATIENT)
Dept: PEDIATRIC MEDICAL GENETICS | Facility: CLINIC | Age: 34
End: 2019-05-17
Payer: COMMERCIAL

## 2019-05-17 PROCEDURE — 99241 OFFICE CONSULTATION NEW/ESTAB PATIENT 15 MIN: CPT

## 2019-05-24 NOTE — CONSULT LETTER
[Dear  ___] : Dear  [unfilled], [Consult Letter:] : I had the pleasure of evaluating your patient, [unfilled]. [Sincerely,] : Sincerely, [FreeTextEntry3] : Gigi Caal MD [Please see my note below.] : Please see my note below.

## 2019-06-01 ENCOUNTER — TRANSCRIPTION ENCOUNTER (OUTPATIENT)
Age: 34
End: 2019-06-01

## 2019-06-13 ENCOUNTER — OTHER (OUTPATIENT)
Age: 34
End: 2019-06-13

## 2019-06-14 ENCOUNTER — APPOINTMENT (OUTPATIENT)
Dept: PEDIATRIC MEDICAL GENETICS | Facility: CLINIC | Age: 34
End: 2019-06-14

## 2019-06-14 ENCOUNTER — OUTPATIENT (OUTPATIENT)
Dept: OUTPATIENT SERVICES | Facility: HOSPITAL | Age: 34
LOS: 1 days | End: 2019-06-14

## 2019-06-14 VITALS
DIASTOLIC BLOOD PRESSURE: 80 MMHG | HEIGHT: 59 IN | TEMPERATURE: 99 F | SYSTOLIC BLOOD PRESSURE: 120 MMHG | WEIGHT: 145.06 LBS | RESPIRATION RATE: 16 BRPM | HEART RATE: 70 BPM

## 2019-06-14 DIAGNOSIS — K40.30 UNILATERAL INGUINAL HERNIA, WITH OBSTRUCTION, WITHOUT GANGRENE, NOT SPECIFIED AS RECURRENT: ICD-10-CM

## 2019-06-14 DIAGNOSIS — Z98.890 OTHER SPECIFIED POSTPROCEDURAL STATES: Chronic | ICD-10-CM

## 2019-06-14 DIAGNOSIS — Z98.89 OTHER SPECIFIED POSTPROCEDURAL STATES: Chronic | ICD-10-CM

## 2019-06-14 LAB
HCT VFR BLD CALC: 38.3 % — SIGNIFICANT CHANGE UP (ref 34.5–45)
HGB BLD-MCNC: 13.2 G/DL — SIGNIFICANT CHANGE UP (ref 11.5–15.5)
MCHC RBC-ENTMCNC: 33.4 PG — SIGNIFICANT CHANGE UP (ref 27–34)
MCHC RBC-ENTMCNC: 34.5 % — SIGNIFICANT CHANGE UP (ref 32–36)
MCV RBC AUTO: 97 FL — SIGNIFICANT CHANGE UP (ref 80–100)
NRBC # FLD: 0 K/UL — SIGNIFICANT CHANGE UP (ref 0–0)
PLATELET # BLD AUTO: 224 K/UL — SIGNIFICANT CHANGE UP (ref 150–400)
PMV BLD: 11 FL — SIGNIFICANT CHANGE UP (ref 7–13)
RBC # BLD: 3.95 M/UL — SIGNIFICANT CHANGE UP (ref 3.8–5.2)
RBC # FLD: 11.8 % — SIGNIFICANT CHANGE UP (ref 10.3–14.5)
WBC # BLD: 6.89 K/UL — SIGNIFICANT CHANGE UP (ref 3.8–10.5)
WBC # FLD AUTO: 6.89 K/UL — SIGNIFICANT CHANGE UP (ref 3.8–10.5)

## 2019-06-14 RX ORDER — FAMOTIDINE 10 MG/ML
1 INJECTION INTRAVENOUS
Qty: 0 | Refills: 0 | DISCHARGE

## 2019-06-14 RX ORDER — UBIDECARENONE 100 MG
1 CAPSULE ORAL
Qty: 0 | Refills: 0 | DISCHARGE

## 2019-06-14 NOTE — H&P PST ADULT - NEGATIVE ENMT SYMPTOMS
no post-nasal discharge/no nose bleeds/no recurrent cold sores/no nasal congestion/no nasal obstruction/no ear pain/no hearing difficulty/no vertigo/no tinnitus/no sinus symptoms/no nasal discharge no post-nasal discharge/no throat pain/no dysphagia/no recurrent cold sores/no abnormal taste sensation/no sinus symptoms/no nasal obstruction/no nose bleeds/no dry mouth/no ear pain/no tinnitus/no hearing difficulty/no vertigo/no nasal congestion/no nasal discharge

## 2019-06-14 NOTE — H&P PST ADULT - NEGATIVE OPHTHALMOLOGIC SYMPTOMS
no diplopia/no lacrimation L/no lacrimation R/no blurred vision L/no blurred vision R/no discharge R/no discharge L/no pain L/no irritation L/no loss of vision R/no irritation R/no pain R/no loss of vision L/no photophobia

## 2019-06-14 NOTE — H&P PST ADULT - HISTORY OF PRESENT ILLNESS
34  year old  female with PMH Migraines, anxiety, Meningioma S/P left posterior fossa craniotomy for tumor removal 3/9/19. S/P Ct scan of pelvis on 02/28/2019 secondary to right inguinal enlarged lymph node. Pt presents to PST for pre op evaluation in preparation for right inguinal hernia repair on 06/0/19 34  year old  female with PMH Migraines, anxiety, Meningioma S/P left posterior fossa craniotomy for tumor removal 3/9/19. S/P Ct scan of pelvis on 02/28/2019 secondary to right inguinal enlarged lymph node. Pt presents to PST for pre op evaluation in preparation for right inguinal hernia repair on 06/20/19

## 2019-06-14 NOTE — H&P PST ADULT - NSICDXPROBLEM_GEN_ALL_CORE_FT
PROBLEM DIAGNOSES  Problem: Unilateral inguinal hernia, with obstruction, without gangrene, not specified as recurrent  Assessment and Plan: Scheduled for right inguinal hernia repair on 06/20/19. Pre op instructions, famotidine, chlorhexidine gluconate soap given and explained. Pt verbalized understanding with return demonstration  Pt instructed to bring urine specimen on day of surgery, container given and explained to pt who verbalized understanding.     Pending last neurology note (05/01/19)

## 2019-06-14 NOTE — H&P PST ADULT - ASSESSMENT
35 y/o female presents with pre op diagnosis: unilateral inguinal hernia, with obstruction, without gangrene

## 2019-06-14 NOTE — H&P PST ADULT - GASTROINTESTINAL DETAILS
no distention/soft/nontender/no masses palpable no masses palpable/nontender/no distention/no rebound tenderness/no rigidity/bowel sounds normal/no bruit/no guarding/no organomegaly/soft

## 2019-06-14 NOTE — H&P PST ADULT - NEGATIVE GENERAL SYMPTOMS
no weight loss/no chills/no fever/no sweating/no anorexia/no weight gain no weight loss/no weight gain/no polyphagia/no chills/no sweating/no anorexia/no polyuria/no malaise/no fever/no polydipsia

## 2019-06-14 NOTE — H&P PST ADULT - NSICDXPASTMEDICALHX_GEN_ALL_CORE_FT
PAST MEDICAL HISTORY:  Anxiety Since dx with meningioma    Migraine without aura and without status migrainosus, not intractable     Mild intermittent asthma without complication only when ill with cold    Palpitations Work up done by Dr. Noel 2018    Syncope, unspecified syncope type Only from intense vomiting from migraine, followed by Bert

## 2019-06-14 NOTE — H&P PST ADULT - NEGATIVE NEUROLOGICAL SYMPTOMS
no difficulty walking/no paresthesias/no facial palsy/no headache/no generalized seizures/no focal seizures/no loss of consciousness/no tremors/no vertigo/no loss of sensation/no transient paralysis/no confusion/no weakness

## 2019-06-14 NOTE — H&P PST ADULT - NEGATIVE SKIN SYMPTOMS
no tumor/no itching/no hair loss/no change in size/color of mole/no rash/no brittle nails no itching/no brittle nails/no tumor/no rash/no dryness/no change in size/color of mole/no hair loss

## 2019-06-14 NOTE — H&P PST ADULT - RS GEN PE MLT RESP DETAILS PC
no subcutaneous emphysema/respirations non-labored/airway patent/clear to auscultation bilaterally/no rales/no rhonchi/no wheezes/no intercostal retractions/no chest wall tenderness/breath sounds equal/good air movement

## 2019-06-14 NOTE — H&P PST ADULT - NEGATIVE CARDIOVASCULAR SYMPTOMS
pale
no chest pain/no paroxysmal nocturnal dyspnea/no orthopnea/no claudication/no palpitations/no dyspnea on exertion/no peripheral edema

## 2019-06-14 NOTE — H&P PST ADULT - MUSCULOSKELETAL
How Severe Is Your Skin Lesion?: mild Have Your Skin Lesions Been Treated?: been treated Is This A New Presentation, Or A Follow-Up?: Skin Lesions negative No joint pain, swelling or deformity; no limitation of movement

## 2019-06-17 ENCOUNTER — RESULT REVIEW (OUTPATIENT)
Age: 34
End: 2019-06-17

## 2019-06-19 ENCOUNTER — TRANSCRIPTION ENCOUNTER (OUTPATIENT)
Age: 34
End: 2019-06-19

## 2019-06-19 ENCOUNTER — APPOINTMENT (OUTPATIENT)
Dept: NEUROSURGERY | Facility: CLINIC | Age: 34
End: 2019-06-19
Payer: COMMERCIAL

## 2019-06-19 PROCEDURE — 99213 OFFICE O/P EST LOW 20 MIN: CPT

## 2019-06-19 NOTE — ASSESSMENT
[FreeTextEntry1] : 34 year old female 3 months s/p resection of left posterior fossa meningioma here for f/u for wound management\par \par -Incision is well healed, no evidence of abscess / dehiscence\par -Recommend continue postoperative wound management\par -Patient scheduled for MRI brain in fall with follow up at that time\par -Continue off AED's and decadron\par -

## 2019-06-19 NOTE — ASU DISCHARGE PLAN (ADULT/PEDIATRIC) - PROCEDURE
Right inguinal hernia repair Right inguinal hernia repair, right superficial lymphadenectomy, excision of round ligament lipoma

## 2019-06-19 NOTE — ASU DISCHARGE PLAN (ADULT/PEDIATRIC) - ASU DC SPECIAL INSTRUCTIONSFT
- Leave steri strips in place  - Ice for swelling  - No lifting >15lbs for 6 weeks  - No driving for 1 week  - Follow up with Dr. Hoskins in the office in 7-10 days

## 2019-06-19 NOTE — ASU DISCHARGE PLAN (ADULT/PEDIATRIC) - CALL YOUR DOCTOR IF YOU HAVE ANY OF THE FOLLOWING:
Swelling that gets worse/Bleeding that does not stop/Nausea and vomiting that does not stop/Numbness, tingling, color or temperature change to extremity/Fever greater than (need to indicate Fahrenheit or Celsius)/Wound/Surgical Site with redness, or foul smelling discharge or pus/Pain not relieved by Medications Bleeding that does not stop/Inability to tolerate liquids or foods/Numbness, tingling, color or temperature change to extremity/Fever greater than (need to indicate Fahrenheit or Celsius)/Pain not relieved by Medications/Wound/Surgical Site with redness, or foul smelling discharge or pus/Nausea and vomiting that does not stop/Swelling that gets worse

## 2019-06-19 NOTE — ASU DISCHARGE PLAN (ADULT/PEDIATRIC) - FOLLOW UP APPOINTMENTS
911 or go to the nearest Emergency Room [facility] ([facility]):

## 2019-06-19 NOTE — ASU DISCHARGE PLAN (ADULT/PEDIATRIC) - CARE PROVIDER_API CALL
Richard Hoskins)  ColonRectal Surgery; Surgery  [address], [address]  [place] [address]  Phone: [phone]  Fax: [phone]  Follow Up Time: 1 week

## 2019-06-19 NOTE — PHYSICAL EXAM
[FreeTextEntry1] : Awake, alert\par Oriented x 3 \par affect appropriate\par Pupils equal and reactive\par extraocular movements intact\par no pronator drift\par no diadochokinesias\par Good strength in all extremities\par Gait normal\par Able to walk in tandem\par Cranial incision is well healed, dry, there is no evidence of dehiscence or discharge

## 2019-06-19 NOTE — REASON FOR VISIT
[Follow-Up: _____] : a [unfilled] follow-up visit [FreeTextEntry1] : Ms. ALISTAIR MOBLEY is a 32 yo female with initial MRI finding of a large left posterior fossa meningioma during migraine evaluation. On 3/9/19, she underwent left suboccipital craniotomy for resection with EVD placement which was subsequently removed. Post operative course was uneventful and repeat CT head on 3/14 was stable. Final patho result showed WHO grade I meningioma. She was discharged to home on 3/14 with Decadron taper and Vimpat for seizure prophylaxis. \par \par Today she returns for 3 months follow up visit. She finished her course of vimpat/decadron taper. She complains of persistent fatigue during the daytime. One week ago she admitted to discharge from the inferior aspect of the incision, which resolved independently. She called the office and was asked to come in for follow up. \par \par She is off all medications\par \par Her PCP is Madison Mcdaniel. \par \par Her CTH 5/1 was stable, no hemorrhage or evidence of hydrocephalus. Repeat MRI on 4/2 showed good post op resection without evidence of residual/hemorrhage/hydrocephalus. 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 \par

## 2019-06-20 ENCOUNTER — RESULT REVIEW (OUTPATIENT)
Age: 34
End: 2019-06-20

## 2019-06-20 ENCOUNTER — OUTPATIENT (OUTPATIENT)
Dept: OUTPATIENT SERVICES | Facility: HOSPITAL | Age: 34
LOS: 1 days | Discharge: HOME | End: 2019-06-20
Payer: COMMERCIAL

## 2019-06-20 VITALS
DIASTOLIC BLOOD PRESSURE: 62 MMHG | HEIGHT: 59 IN | OXYGEN SATURATION: 100 % | HEART RATE: 62 BPM | TEMPERATURE: 98 F | RESPIRATION RATE: 17 BRPM | WEIGHT: 145.06 LBS | SYSTOLIC BLOOD PRESSURE: 110 MMHG

## 2019-06-20 VITALS — RESPIRATION RATE: 12 BRPM | OXYGEN SATURATION: 100 %

## 2019-06-20 DIAGNOSIS — K40.30 UNILATERAL INGUINAL HERNIA, WITH OBSTRUCTION, WITHOUT GANGRENE, NOT SPECIFIED AS RECURRENT: ICD-10-CM

## 2019-06-20 DIAGNOSIS — Z98.89 OTHER SPECIFIED POSTPROCEDURAL STATES: Chronic | ICD-10-CM

## 2019-06-20 DIAGNOSIS — Z98.890 OTHER SPECIFIED POSTPROCEDURAL STATES: Chronic | ICD-10-CM

## 2019-06-20 PROCEDURE — 88302 TISSUE EXAM BY PATHOLOGIST: CPT | Mod: 26

## 2019-06-20 PROCEDURE — 88305 TISSUE EXAM BY PATHOLOGIST: CPT | Mod: 26

## 2019-06-20 RX ORDER — ACETAMINOPHEN 500 MG
2 TABLET ORAL
Qty: 0 | Refills: 0 | DISCHARGE

## 2019-06-28 ENCOUNTER — APPOINTMENT (OUTPATIENT)
Dept: PAIN MANAGEMENT | Facility: CLINIC | Age: 34
End: 2019-06-28

## 2019-07-11 ENCOUNTER — INBOUND DOCUMENT (OUTPATIENT)
Age: 34
End: 2019-07-11

## 2019-07-29 ENCOUNTER — APPOINTMENT (OUTPATIENT)
Dept: FAMILY MEDICINE | Facility: CLINIC | Age: 34
End: 2019-07-29
Payer: COMMERCIAL

## 2019-07-29 VITALS
DIASTOLIC BLOOD PRESSURE: 70 MMHG | RESPIRATION RATE: 15 BRPM | SYSTOLIC BLOOD PRESSURE: 106 MMHG | BODY MASS INDEX: 28.27 KG/M2 | WEIGHT: 144 LBS | OXYGEN SATURATION: 97 % | HEIGHT: 60 IN | TEMPERATURE: 98.3 F | HEART RATE: 69 BPM

## 2019-07-29 PROCEDURE — 99395 PREV VISIT EST AGE 18-39: CPT

## 2019-09-04 ENCOUNTER — APPOINTMENT (OUTPATIENT)
Dept: NEUROSURGERY | Facility: CLINIC | Age: 34
End: 2019-09-04
Payer: COMMERCIAL

## 2019-09-04 VITALS
HEART RATE: 72 BPM | TEMPERATURE: 97.9 F | RESPIRATION RATE: 16 BRPM | SYSTOLIC BLOOD PRESSURE: 108 MMHG | DIASTOLIC BLOOD PRESSURE: 62 MMHG | OXYGEN SATURATION: 99 %

## 2019-09-04 DIAGNOSIS — Z78.9 OTHER SPECIFIED HEALTH STATUS: ICD-10-CM

## 2019-09-04 PROCEDURE — 99213 OFFICE O/P EST LOW 20 MIN: CPT

## 2019-09-04 NOTE — ASSESSMENT
[FreeTextEntry1] : Discussion/Plan:\par -Left posterior longitudinal surgical wound evaluated as noted above.\par - No intervention indicated at this time.\par - Patient instructed to stop using bactroban and all topical ointments and lotions. \par - Keep site clean and dry.\par - Patient instructed to wash hair with gentle shampoo (baby shampoo) and use conditioner without fragrance.\par - Patient instructed and educated not to touch the incision site. She was continuously touching the site during the office visit prior to physical examination of the site.\par - Reviewed signs and symptoms of infection. Fever, drainage, redness, urticaria, pain. It was reiterated not to apply antibiotic ointment. Patient to contact the office immediately to be seen if symptoms develop.\par

## 2019-09-04 NOTE — HISTORY OF PRESENT ILLNESS
[FreeTextEntry1] : ALISTAIR MOBLEY is a 34 year old female being seen for a follow-up visit, F/u for postoperative wound management. \par \par Ms. ALISTAIR MOBLEY is a 34 yo female with initial MRI finding of a large left posterior fossa meningioma during migraine evaluation. On 3/9/19, she underwent left suboccipital craniotomy for resection with EVD placement which was subsequently removed. Post operative course was uneventful and repeat CT head on 3/14 was stable. Final patho result showed WHO grade I meningioma.

## 2019-09-04 NOTE — PHYSICAL EXAM
[Longitudinal] : longitudinal [Clean] : clean [Healing Well] : healing well [No Drainage] : without drainage [___ Drainage] : exhibiting [unfilled] drainage [Normal Skin] : normal [Normal Skin Turgor] : skin turgor was normal [Erythema] : not erythematous [Tender] : not tender [Oriented To Time, Place, And Person] : oriented to person, place, and time [Impaired Insight] : insight and judgment were intact [Affect] : the affect was normal [FreeTextEntry1] : Anxious [Person] : oriented to person [Place] : oriented to place [Time] : oriented to time [Short Term Intact] : short term memory intact [Remote Intact] : remote memory intact [Span Intact] : the attention span was normal [Concentration Intact] : normal concentrating ability [Fluency] : fluency intact [Comprehension] : comprehension intact [Current Events] : adequate knowledge of current events [Past History] : adequate knowledge of personal past history [Vocabulary] : adequate range of vocabulary [Cranial Nerves Optic (II)] : visual acuity intact bilaterally,  pupils equal round and reactive to light [Cranial Nerves Oculomotor (III)] : extraocular motion intact [Cranial Nerves Trigeminal (V)] : facial sensation intact symmetrically [Cranial Nerves Facial (VII)] : face symmetrical [Cranial Nerves Vestibulocochlear (VIII)] : hearing was intact bilaterally [Cranial Nerves Glossopharyngeal (IX)] : tongue and palate midline [Cranial Nerves Accessory (XI - Cranial And Spinal)] : head turning and shoulder shrug symmetric [Cranial Nerves Hypoglossal (XII)] : there was no tongue deviation with protrusion [Motor Tone] : muscle tone was normal in all four extremities [Motor Strength] : muscle strength was normal in all four extremities [No Muscle Atrophy] : normal bulk in all four extremities [Sensation Tactile Decrease] : light touch was intact [Abnormal Walk] : normal gait [Balance] : balance was intact [Past-pointing] : there was no past-pointing [Tremor] : no tremor present [2+] : Patella left 2+ [Sclera] : the sclera and conjunctiva were normal [PERRL With Normal Accommodation] : pupils were equal in size, round, reactive to light, with normal accommodation [Extraocular Movements] : extraocular movements were intact [Outer Ear] : the ears and nose were normal in appearance [Neck Appearance] : the appearance of the neck was normal [Oropharynx] : the oropharynx was normal [Neck Cervical Mass (___cm)] : no neck mass was observed [Jugular Venous Distention Increased] : there was no jugular-venous distention [Thyroid Diffuse Enlargement] : the thyroid was not enlarged [Thyroid Nodule] : there were no palpable thyroid nodules

## 2019-09-04 NOTE — REASON FOR VISIT
[Follow-Up: _____] : a [unfilled] follow-up visit [FreeTextEntry1] : Patient with complaints of intermittent drainage and bleeding at the craniotomy site. No swelling, pain, urticaria or tenderness. Denies fever, headache, dizziness, body aches. Patient has been applying bactroban ointment to the site. This was not a prescription prescribed for this site or by this office.\par Patient reports eyes have been difficult to focus. No recent ophthalmology evaluation.\par Patient has been independent in ADLs and working full time in [place] commuting daily without difficulty.

## 2019-09-30 ENCOUNTER — FORM ENCOUNTER (OUTPATIENT)
Age: 34
End: 2019-09-30

## 2019-10-01 ENCOUNTER — APPOINTMENT (OUTPATIENT)
Dept: MRI IMAGING | Facility: CLINIC | Age: 34
End: 2019-10-01
Payer: COMMERCIAL

## 2019-10-01 ENCOUNTER — OUTPATIENT (OUTPATIENT)
Dept: OUTPATIENT SERVICES | Facility: HOSPITAL | Age: 34
LOS: 1 days | End: 2019-10-01
Payer: COMMERCIAL

## 2019-10-01 DIAGNOSIS — Z98.890 OTHER SPECIFIED POSTPROCEDURAL STATES: Chronic | ICD-10-CM

## 2019-10-01 DIAGNOSIS — Z98.89 OTHER SPECIFIED POSTPROCEDURAL STATES: Chronic | ICD-10-CM

## 2019-10-01 DIAGNOSIS — D32.9 BENIGN NEOPLASM OF MENINGES, UNSPECIFIED: ICD-10-CM

## 2019-10-01 PROCEDURE — A9585: CPT

## 2019-10-01 PROCEDURE — 70553 MRI BRAIN STEM W/O & W/DYE: CPT | Mod: 26

## 2019-10-01 PROCEDURE — 70553 MRI BRAIN STEM W/O & W/DYE: CPT

## 2019-10-02 ENCOUNTER — APPOINTMENT (OUTPATIENT)
Dept: NEUROSURGERY | Facility: CLINIC | Age: 34
End: 2019-10-02
Payer: COMMERCIAL

## 2019-10-02 PROCEDURE — 99213 OFFICE O/P EST LOW 20 MIN: CPT

## 2019-10-14 ENCOUNTER — OUTPATIENT (OUTPATIENT)
Dept: OUTPATIENT SERVICES | Facility: HOSPITAL | Age: 34
LOS: 1 days | End: 2019-10-14
Payer: COMMERCIAL

## 2019-10-14 VITALS
HEIGHT: 59 IN | SYSTOLIC BLOOD PRESSURE: 108 MMHG | OXYGEN SATURATION: 99 % | WEIGHT: 143.96 LBS | DIASTOLIC BLOOD PRESSURE: 68 MMHG | HEART RATE: 68 BPM | RESPIRATION RATE: 16 BRPM | TEMPERATURE: 98 F

## 2019-10-14 DIAGNOSIS — Z98.89 OTHER SPECIFIED POSTPROCEDURAL STATES: Chronic | ICD-10-CM

## 2019-10-14 DIAGNOSIS — D32.9 BENIGN NEOPLASM OF MENINGES, UNSPECIFIED: ICD-10-CM

## 2019-10-14 DIAGNOSIS — T81.89XA OTHER COMPLICATIONS OF PROCEDURES, NOT ELSEWHERE CLASSIFIED, INITIAL ENCOUNTER: ICD-10-CM

## 2019-10-14 DIAGNOSIS — Z01.818 ENCOUNTER FOR OTHER PREPROCEDURAL EXAMINATION: ICD-10-CM

## 2019-10-14 DIAGNOSIS — Z98.890 OTHER SPECIFIED POSTPROCEDURAL STATES: Chronic | ICD-10-CM

## 2019-10-14 DIAGNOSIS — Z98.890 OTHER SPECIFIED POSTPROCEDURAL STATES: ICD-10-CM

## 2019-10-14 LAB
ANION GAP SERPL CALC-SCNC: 10 MMOL/L — SIGNIFICANT CHANGE UP (ref 5–17)
BUN SERPL-MCNC: 12 MG/DL — SIGNIFICANT CHANGE UP (ref 7–23)
CALCIUM SERPL-MCNC: 9 MG/DL — SIGNIFICANT CHANGE UP (ref 8.4–10.5)
CHLORIDE SERPL-SCNC: 103 MMOL/L — SIGNIFICANT CHANGE UP (ref 96–108)
CO2 SERPL-SCNC: 24 MMOL/L — SIGNIFICANT CHANGE UP (ref 22–31)
CREAT SERPL-MCNC: 0.71 MG/DL — SIGNIFICANT CHANGE UP (ref 0.5–1.3)
GLUCOSE SERPL-MCNC: 73 MG/DL — SIGNIFICANT CHANGE UP (ref 70–99)
HCT VFR BLD CALC: 39.4 % — SIGNIFICANT CHANGE UP (ref 34.5–45)
HGB BLD-MCNC: 13.2 G/DL — SIGNIFICANT CHANGE UP (ref 11.5–15.5)
MCHC RBC-ENTMCNC: 32.9 PG — SIGNIFICANT CHANGE UP (ref 27–34)
MCHC RBC-ENTMCNC: 33.5 GM/DL — SIGNIFICANT CHANGE UP (ref 32–36)
MCV RBC AUTO: 98.3 FL — SIGNIFICANT CHANGE UP (ref 80–100)
PLATELET # BLD AUTO: 227 K/UL — SIGNIFICANT CHANGE UP (ref 150–400)
POTASSIUM SERPL-MCNC: 4.6 MMOL/L — SIGNIFICANT CHANGE UP (ref 3.5–5.3)
POTASSIUM SERPL-SCNC: 4.6 MMOL/L — SIGNIFICANT CHANGE UP (ref 3.5–5.3)
RBC # BLD: 4.01 M/UL — SIGNIFICANT CHANGE UP (ref 3.8–5.2)
RBC # FLD: 11.8 % — SIGNIFICANT CHANGE UP (ref 10.3–14.5)
SODIUM SERPL-SCNC: 137 MMOL/L — SIGNIFICANT CHANGE UP (ref 135–145)
WBC # BLD: 4.61 K/UL — SIGNIFICANT CHANGE UP (ref 3.8–10.5)
WBC # FLD AUTO: 4.61 K/UL — SIGNIFICANT CHANGE UP (ref 3.8–10.5)

## 2019-10-14 PROCEDURE — G0463: CPT

## 2019-10-14 PROCEDURE — 85027 COMPLETE CBC AUTOMATED: CPT

## 2019-10-14 PROCEDURE — 80048 BASIC METABOLIC PNL TOTAL CA: CPT

## 2019-10-14 RX ORDER — IBUPROFEN 200 MG
1 TABLET ORAL
Qty: 0 | Refills: 0 | DISCHARGE

## 2019-10-14 NOTE — H&P PST ADULT - NSICDXPROBLEM_GEN_ALL_CORE_FT
PROBLEM DIAGNOSES  Problem: Non-healing surgical wound  Assessment and Plan: Revision of Scalp Incision on  10/18/19  Pre-op education provided - all questions answered

## 2019-10-14 NOTE — H&P PST ADULT - MEDICATION HERBAL REMEDIES, PROFILE
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 He was found to be symptomatic.  After obtaining informed consent and preoperative clearance, the patient was brought to the operating room for open treatment of right scaphoid nonunion with bone grafting from the distal radius.    PREOPERATIVE DIAGNOSIS:  Right scaphoid fracture with nonunion, as well as avascular necrosis.    POSTOPERATIVE DIAGNOSIS:  Right scaphoid fracture with nonunion, as well as avascular necrosis.    PROCEDURE PERFORMED:  Open treatment right scaphoid nonunion with internal fixation and bone grafting from the distal radius.    SURGEON:  Kem Lim MD.     ASSISTANT(S):  Kelley Kaur PA-C who was important as far as retraction, set up, positioning, and placement of hardware.  Иван Sheehan SA.    ANESTHESIA:  This was done under a regional anesthetic with MAC.  We augmented this with 1% Lidocaine plain.      COMPLICATIONS:  None.    INSTRUMENTATION USED:  Synthes 3.0 mm headless compression screw measuring 20 mm in length.  Bone graft was harvested from the distal radius.    DESCRIPTION OF PROCEDURE:  The patient was brought in the operating room, placed in the supine position on the operating table.  Timeout was taken to verify patient, site and procedure.  The patient was given a general anesthetic and preoperative antibiotics.  The patient's right upper extremity was then prepped and draped in standard surgical fashion.  The arm was exsanguinated, the tourniquet was insufflated.    I made a longitudinal incision over the dorsal aspect of the patient's right wrist.  We had obtained a CT scan beforehand, which showed no humpback deformity.  We were able to approach this completely from a dorsal incision.    I split the skin incision longitudinally.  Bipolar cautery was used for hemostasis.  I split the extensor retinaculum  and worked between the third and fourth compartments.  Care was taken to protect the extensor tendons.  I identified the EPL.  This was protected.  I then subluxated the EPL radially.  The EDC tendons were moved ulnarly.  I removed Laura's tubercle with a rongeur and then obtained cancellous bone graft from the distal radius.  We were able to take out copious amounts of bone graft for placement within our scaphoid nonunion.    I extended the incision and exposed the joint.  A capsulotomy was performed within the wrist.  I was able to identify the scaphoid. The scapholunate ligament was intact.  The scaphoid nonunion and fracture was through some of the articular cartilage, particularly on the radial aspect.  This was easily identified and I placed a freer elevator in this region to further manipulate the fracture.    At that time, attention was turned to drilling.  I placed a pin within the proximal pole of the scaphoid.  This was placed down the longitudinal axis of this.  We visualized the placement on AP and lateral images of fluoroscopy.  I measured this to be 28 mm in length without compression.    Instrumentation was then removed.  I then reamed over the top of this and created an inflammatory reaction.  Instrumentation was then removed.  I packed bone graft within the proximal hole within the proximal aspect of the wound as well as within the nonunion site.  Nonunion was bone grafted thoroughly.  Thorough bone graft was placed in the region.  The entirety of the bone graft was used to place through into our scaphoid nonunion.    Attention was then turned back to fixation.  I placed a pin again back in the proximal hole and reevaluate the placement of it on AP and lateral images of fluoroscopy.  We remeasured.  This measured again 28 mm in length.  It was felt that we would have significant compression with this in place.  I then placed a 20 mm screw over the wire.  This allowed for me to compress it.  I had  excellent compression of our scaphoid with excellent fixation and placement of hardware.    Final images were obtained which showed anatomic reduction of her scaphoid, good placement of hardware.  Wound was irrigated with Normal Saline and closed with 3-0 Vicryl and 4-0 nylon.  I anesthetized the incision with 0.5% Marcaine plain.  Tourniquet was let down.  Fingers pinked up immediately.  The patient was awoken from anesthesia after being placed in a thumb spica splint and taken to recovery room without complication.      Dictated By: Kem Lim MD  Signing Provider: Kem Lim MD DC/PIERRE ([id number])  DD: 07/18/2018 14:35:52 TD: 07/18/2018 14:55:17    Copy Sent To:    no

## 2019-10-14 NOTE — H&P PST ADULT - ATTENDING COMMENTS
The patient has had intermittent blistering and drainage from the inferior portion of her left posterior fossa surgical incision. No fever, no purulence. Area is nontender with no erythema or fluctuance. She is neurologically intact and MRI does not show any evidence of deep infection or communication with superficial scalp. Plan: revision of incision. I have discussed the risks, benefits, and alternatives to surgery with the patient, and answered all questions.

## 2019-10-14 NOTE — H&P PST ADULT - NEGATIVE OPHTHALMOLOGIC SYMPTOMS
no blurred vision R/no discharge R/no pain L/no diplopia/no lacrimation L/no lacrimation R/no photophobia/no discharge L/no irritation R/no blurred vision L/no pain R/no irritation L/no loss of vision L/no loss of vision R

## 2019-10-14 NOTE — H&P PST ADULT - HISTORY OF PRESENT ILLNESS
33 y/o female with PMH Migraines, anxiety, Meningioma S/P left posterior fossa craniotomy for tumor removal 3/9/19., C/O intermittent drainage and bleeding at the craniotomy site. Denies swelling, pain, urticaria or tenderness. Today she presents to PST for scheduled Revision of Scalp Incision on 10/18/19.

## 2019-10-14 NOTE — H&P PST ADULT - NEGATIVE NEUROLOGICAL SYMPTOMS
no weakness/no generalized seizures/no loss of sensation/no loss of consciousness/no tremors/no vertigo/no facial palsy/no paresthesias/no headache/no difficulty walking/no confusion/no transient paralysis/no focal seizures

## 2019-10-14 NOTE — H&P PST ADULT - RS GEN PE MLT RESP DETAILS PC
no rales/no chest wall tenderness/no subcutaneous emphysema/airway patent/good air movement/breath sounds equal/respirations non-labored/no intercostal retractions/no wheezes/clear to auscultation bilaterally/no rhonchi

## 2019-10-14 NOTE — H&P PST ADULT - VISION (WITH CORRECTIVE LENSES IF THE PATIENT USUALLY WEARS THEM):
Partially impaired: cannot see medication labels or newsprint, but can see obstacles in path, and the surrounding layout; can count fingers at arm's length/contact lenses

## 2019-10-14 NOTE — H&P PST ADULT - NSICDXPASTMEDICALHX_GEN_ALL_CORE_FT
PAST MEDICAL HISTORY:  Anxiety Since dx with meningioma    Migraine without aura and without status migrainosus, not intractable     Mild intermittent asthma without complication only when ill with cold    Non-healing surgical wound     Palpitations Work up done by Dr. Noel 2018    Syncope, unspecified syncope type Only from intense vomiting from migraine, followed by Bert

## 2019-10-14 NOTE — H&P PST ADULT - GASTROINTESTINAL DETAILS
no guarding/no rigidity/no distention/no masses palpable/soft/nontender/no rebound tenderness/bowel sounds normal

## 2019-10-14 NOTE — H&P PST ADULT - NEGATIVE SKIN SYMPTOMS
no change in size/color of mole/no rash/no hair loss/no tumor/no itching/no dryness/no brittle nails

## 2019-10-18 ENCOUNTER — OUTPATIENT (OUTPATIENT)
Dept: OUTPATIENT SERVICES | Facility: HOSPITAL | Age: 34
LOS: 1 days | End: 2019-10-18
Payer: COMMERCIAL

## 2019-10-18 ENCOUNTER — APPOINTMENT (OUTPATIENT)
Dept: NEUROSURGERY | Facility: HOSPITAL | Age: 34
End: 2019-10-18
Payer: COMMERCIAL

## 2019-10-18 VITALS
SYSTOLIC BLOOD PRESSURE: 109 MMHG | OXYGEN SATURATION: 99 % | WEIGHT: 143.96 LBS | HEART RATE: 70 BPM | DIASTOLIC BLOOD PRESSURE: 72 MMHG | TEMPERATURE: 98 F | RESPIRATION RATE: 18 BRPM | HEIGHT: 59 IN

## 2019-10-18 VITALS
RESPIRATION RATE: 17 BRPM | OXYGEN SATURATION: 98 % | SYSTOLIC BLOOD PRESSURE: 104 MMHG | DIASTOLIC BLOOD PRESSURE: 53 MMHG | TEMPERATURE: 97 F | HEART RATE: 66 BPM

## 2019-10-18 DIAGNOSIS — Z98.891 HISTORY OF UTERINE SCAR FROM PREVIOUS SURGERY: ICD-10-CM

## 2019-10-18 DIAGNOSIS — Z98.89 OTHER SPECIFIED POSTPROCEDURAL STATES: Chronic | ICD-10-CM

## 2019-10-18 DIAGNOSIS — Z01.818 ENCOUNTER FOR OTHER PREPROCEDURAL EXAMINATION: ICD-10-CM

## 2019-10-18 DIAGNOSIS — Z98.890 OTHER SPECIFIED POSTPROCEDURAL STATES: Chronic | ICD-10-CM

## 2019-10-18 DIAGNOSIS — Z98.890 OTHER SPECIFIED POSTPROCEDURAL STATES: ICD-10-CM

## 2019-10-18 DIAGNOSIS — D32.9 BENIGN NEOPLASM OF MENINGES, UNSPECIFIED: ICD-10-CM

## 2019-10-18 LAB — HCG UR QL: NEGATIVE — SIGNIFICANT CHANGE UP

## 2019-10-18 PROCEDURE — 11423 EXC H-F-NK-SP B9+MARG 2.1-3: CPT

## 2019-10-18 PROCEDURE — 20999 UNLISTED PX MUSCSKEL GENERAL: CPT

## 2019-10-18 PROCEDURE — 81025 URINE PREGNANCY TEST: CPT

## 2019-10-18 RX ORDER — CEPHALEXIN 500 MG
1 CAPSULE ORAL
Qty: 2 | Refills: 0
Start: 2019-10-18 | End: 2019-10-18

## 2019-10-18 RX ORDER — CHLORHEXIDINE GLUCONATE 213 G/1000ML
1 SOLUTION TOPICAL ONCE
Refills: 0 | Status: DISCONTINUED | OUTPATIENT
Start: 2019-10-18 | End: 2019-10-18

## 2019-10-18 RX ORDER — CEPHALEXIN 500 MG
1 CAPSULE ORAL
Qty: 0 | Refills: 0 | DISCHARGE
Start: 2019-10-18 | End: 2019-10-18

## 2019-10-18 RX ORDER — HYDROMORPHONE HYDROCHLORIDE 2 MG/ML
0.5 INJECTION INTRAMUSCULAR; INTRAVENOUS; SUBCUTANEOUS
Refills: 0 | Status: DISCONTINUED | OUTPATIENT
Start: 2019-10-18 | End: 2019-10-18

## 2019-10-18 RX ORDER — SODIUM CHLORIDE 9 MG/ML
3 INJECTION INTRAMUSCULAR; INTRAVENOUS; SUBCUTANEOUS EVERY 8 HOURS
Refills: 0 | Status: DISCONTINUED | OUTPATIENT
Start: 2019-10-18 | End: 2019-10-18

## 2019-10-18 RX ORDER — LIDOCAINE HCL 20 MG/ML
0.2 VIAL (ML) INJECTION ONCE
Refills: 0 | Status: DISCONTINUED | OUTPATIENT
Start: 2019-10-18 | End: 2019-10-18

## 2019-10-18 NOTE — ASU DISCHARGE PLAN (ADULT/PEDIATRIC) - CARE PROVIDER_API CALL
Thierry Garner)  Neurological Surgery  [address] [place] [address]  Phone: [phone]  Fax: [phone]  Follow Up Time: 1 week

## 2019-10-22 ENCOUNTER — APPOINTMENT (OUTPATIENT)
Dept: OPHTHALMOLOGY | Facility: CLINIC | Age: 34
End: 2019-10-22

## 2019-10-28 ENCOUNTER — APPOINTMENT (OUTPATIENT)
Dept: OPHTHALMOLOGY | Facility: CLINIC | Age: 34
End: 2019-10-28
Payer: COMMERCIAL

## 2019-10-28 ENCOUNTER — NON-APPOINTMENT (OUTPATIENT)
Age: 34
End: 2019-10-28

## 2019-10-28 PROCEDURE — 92133 CPTRZD OPH DX IMG PST SGM ON: CPT

## 2019-10-28 PROCEDURE — 92004 COMPRE OPH EXAM NEW PT 1/>: CPT

## 2019-10-28 PROCEDURE — 92083 EXTENDED VISUAL FIELD XM: CPT

## 2019-10-30 ENCOUNTER — APPOINTMENT (OUTPATIENT)
Dept: NEUROSURGERY | Facility: CLINIC | Age: 34
End: 2019-10-30
Payer: COMMERCIAL

## 2019-10-30 VITALS
OXYGEN SATURATION: 98 % | RESPIRATION RATE: 16 BRPM | TEMPERATURE: 98.2 F | SYSTOLIC BLOOD PRESSURE: 102 MMHG | DIASTOLIC BLOOD PRESSURE: 62 MMHG | HEART RATE: 74 BPM

## 2019-10-30 PROCEDURE — 99024 POSTOP FOLLOW-UP VISIT: CPT

## 2019-10-30 NOTE — PHYSICAL EXAM
[General Appearance - Alert] : alert [General Appearance - In No Acute Distress] : in no acute distress [Healing Well] : healing well [No Drainage] : without drainage [Normal Skin] : normal [Tender] : not tender [Normal Skin Turgor] : skin turgor was normal [FreeTextEntry1] : left posterior cranial incision [Oriented To Time, Place, And Person] : oriented to person, place, and time [Impaired Insight] : insight and judgment were intact [Affect] : the affect was normal [Person] : oriented to person [Place] : oriented to place [Time] : oriented to time [Short Term Intact] : short term memory intact [Remote Intact] : remote memory intact [Span Intact] : the attention span was normal [Concentration Intact] : normal concentrating ability [Fluency] : fluency intact [Comprehension] : comprehension intact [Current Events] : adequate knowledge of current events [Past History] : adequate knowledge of personal past history [Vocabulary] : adequate range of vocabulary [Cranial Nerves Optic (II)] : visual acuity intact bilaterally,  pupils equal round and reactive to light [Cranial Nerves Oculomotor (III)] : extraocular motion intact [Cranial Nerves Trigeminal (V)] : facial sensation intact symmetrically [Cranial Nerves Facial (VII)] : face symmetrical [Cranial Nerves Vestibulocochlear (VIII)] : hearing was intact bilaterally [Cranial Nerves Glossopharyngeal (IX)] : tongue and palate midline [Cranial Nerves Accessory (XI - Cranial And Spinal)] : head turning and shoulder shrug symmetric [Cranial Nerves Hypoglossal (XII)] : there was no tongue deviation with protrusion [Motor Strength] : muscle strength was normal in all four extremities [No Muscle Atrophy] : normal bulk in all four extremities [Motor Handedness Right-Handed] : the patient is right hand dominant [Sensation Tactile Decrease] : light touch was intact [Balance] : balance was intact [Past-pointing] : there was no past-pointing [Tremor] : no tremor present [2+] : Patella left 2+ [Sclera] : the sclera and conjunctiva were normal [PERRL With Normal Accommodation] : pupils were equal in size, round, reactive to light, with normal accommodation [Extraocular Movements] : extraocular movements were intact [Outer Ear] : the ears and nose were normal in appearance [Oropharynx] : the oropharynx was normal [Abnormal Walk] : normal gait [Nail Clubbing] : no clubbing  or cyanosis of the fingernails [Musculoskeletal - Swelling] : no joint swelling seen [Motor Tone] : muscle strength and tone were normal [Skin Color & Pigmentation] : normal skin color and pigmentation [Skin Turgor] : normal skin turgor [] : no rash

## 2019-10-30 NOTE — ASSESSMENT
[FreeTextEntry1] : Discussion:\par - Patient healing well.\par - Follow-up MRI +OV in 1 year.\par - Symptoms continue to resolve.

## 2019-11-27 ENCOUNTER — APPOINTMENT (OUTPATIENT)
Dept: FAMILY MEDICINE | Facility: CLINIC | Age: 34
End: 2019-11-27

## 2020-01-08 ENCOUNTER — TRANSCRIPTION ENCOUNTER (OUTPATIENT)
Age: 35
End: 2020-01-08

## 2020-02-03 ENCOUNTER — APPOINTMENT (OUTPATIENT)
Dept: PAIN MANAGEMENT | Facility: CLINIC | Age: 35
End: 2020-02-03
Payer: COMMERCIAL

## 2020-02-03 VITALS
BODY MASS INDEX: 28.27 KG/M2 | HEIGHT: 60 IN | HEART RATE: 71 BPM | DIASTOLIC BLOOD PRESSURE: 75 MMHG | WEIGHT: 144 LBS | SYSTOLIC BLOOD PRESSURE: 116 MMHG

## 2020-02-03 PROCEDURE — 99214 OFFICE O/P EST MOD 30 MIN: CPT

## 2020-02-03 NOTE — PHYSICAL EXAM
[General Appearance - In No Acute Distress] : in no acute distress [General Appearance - Alert] : alert [General Appearance - Well-Appearing] : healthy appearing [General Appearance - Well Nourished] : well nourished [General Appearance - Well Developed] : well developed [Oriented To Time, Place, And Person] : oriented to person, place, and time [Affect] : the affect was normal [Impaired Insight] : insight and judgment were intact [Mood] : the mood was normal [Memory Recent] : recent memory was not impaired [Memory Remote] : remote memory was not impaired [Person] : oriented to person [Place] : oriented to place [Short Term Intact] : short term memory intact [Time] : oriented to time [Remote Intact] : remote memory intact [Registration Intact] : recent registration memory intact [Visual Intact] : visual attention was ~T not ~L decreased [Concentration Intact] : normal concentrating ability [Naming Objects] : no difficulty naming common objects [Writing A Sentence] : no difficulty writing a sentence [Comprehension] : comprehension intact [Fluency] : fluency intact [Current Events] : adequate knowledge of current events [Reading] : reading intact [Past History] : adequate knowledge of personal past history [Vocabulary] : adequate range of vocabulary [Cranial Nerves Trigeminal (V)] : facial sensation intact symmetrically [Cranial Nerves Oculomotor (III)] : extraocular motion intact [Cranial Nerves Accessory (XI - Cranial And Spinal)] : head turning and shoulder shrug symmetric [Cranial Nerves Vestibulocochlear (VIII)] : hearing was intact bilaterally [Cranial Nerves Hypoglossal (XII)] : there was no tongue deviation with protrusion [Cranial Nerves Facial (VII)] : face symmetrical [Motor Strength] : muscle strength was normal in all four extremities [No Muscle Atrophy] : normal bulk in all four extremities [Motor Handedness Right-Handed] : the patient is right hand dominant [Motor Strength Upper Extremities Bilaterally] : strength was normal in both upper extremities [Motor Strength Lower Extremities Bilaterally] : strength was normal in both lower extremities [Allodynia] : no ~T allodynia present [Sensation Tactile Decrease] : light touch was intact [Balance] : balance was intact [Limited Balance] : balance was intact [Tremor] : no tremor present [Past-pointing] : there was no past-pointing [Coordination - Dysmetria Impaired Finger-to-Nose Bilateral] : not present [Dysdiadochokinesia Bilaterally] : not present [Sclera] : the sclera and conjunctiva were normal [PERRL With Normal Accommodation] : pupils were equal in size, round, reactive to light, with normal accommodation [Outer Ear] : the ears and nose were normal in appearance [Extraocular Movements] : extraocular movements were intact [FreeTextEntry1] : moderate decrease rom in both paraspinals [Exaggerated Use Of Accessory Muscles For Inspiration] : no accessory muscle use [Edema] : there was no peripheral edema [No CVA Tenderness] : no ~M costovertebral angle tenderness [Abdomen Tenderness] : non-tender [Abnormal Walk] : normal gait [No Spinal Tenderness] : no spinal tenderness [Nail Clubbing] : no clubbing  or cyanosis of the fingernails [Involuntary Movements] : no involuntary movements were seen [Musculoskeletal - Swelling] : no joint swelling seen [Motor Tone] : muscle strength and tone were normal [Skin Color & Pigmentation] : normal skin color and pigmentation [] : no rash [Skin Turgor] : normal skin turgor

## 2020-02-03 NOTE — HISTORY OF PRESENT ILLNESS
[FreeTextEntry1] : Pt rtc and restates story of meningioma removal.  Does have intermittent feelings of rapid fatigue "like narcolepsy" and unclear if she had change since surgery.\hina Does have some intermittent difficulty with focus with vision "will get rapid nystagmus"  Had seen neurooptholmogist who noted the saccadic movement. "feels like my eyes are crossing."  \par No more pulsatile tinnitus since surgery.\hina Does feel intermittently impaired but "able to do big presentations."\hina Did not pursue rehab following surgery.\hina Never felt issue with driving as well.\hina Has had significant reduction in her headaches- only 3 bad ones and treated with tylenol.\hina Never used rizatriptan and did use ondansetron with moderate effect. [Headache] : headache [Dizziness] : dizziness [Neck Pain] : neck pain [Scalp Tenderness] : scalp tenderness [> 4 hours] : > 4 hours [stayed the same] : stayed the same [9] : a maximum pain level of 9/10 [4] : a minimum pain level of 4/10 [Stable] : The patient reports ~his/her~ symptoms since the last visit are stable

## 2020-02-03 NOTE — REVIEW OF SYSTEMS
[Chills] : no chills [Feeling Poorly] : not feeling poorly [Fever] : no fever [Eye Pain] : eye pain [Feeling Tired] : feeling tired [Loss Of Hearing] : no hearing loss [Eyesight Problems] : no eyesight problems [Chest Pain] : no chest pain [Palpitations] : palpitations [Cough] : no cough [Arthralgias] : arthralgias [Neck Pain] : neck pain [Lower Back Pain] : no lower back pain [Skin Lesions] : no skin lesions [Skin Wound] : no skin wound [Itching] : no itching [Dizziness] : dizziness [Muscle Weakness] : no muscle weakness [Sleep Disturbances] : sleep disturbances

## 2020-02-03 NOTE — ASSESSMENT
[FreeTextEntry1] : Pt doing better with her headaches following surgery for meningioma and hydrocephalus\par continue current care\par rtc 1 year.

## 2020-03-23 ENCOUNTER — APPOINTMENT (OUTPATIENT)
Dept: FAMILY MEDICINE | Facility: CLINIC | Age: 35
End: 2020-03-23

## 2020-09-08 ENCOUNTER — OUTPATIENT (OUTPATIENT)
Dept: OUTPATIENT SERVICES | Facility: HOSPITAL | Age: 35
LOS: 1 days | End: 2020-09-08
Payer: COMMERCIAL

## 2020-09-08 DIAGNOSIS — Z98.89 OTHER SPECIFIED POSTPROCEDURAL STATES: Chronic | ICD-10-CM

## 2020-09-08 DIAGNOSIS — Z98.890 OTHER SPECIFIED POSTPROCEDURAL STATES: Chronic | ICD-10-CM

## 2020-09-08 DIAGNOSIS — Z11.59 ENCOUNTER FOR SCREENING FOR OTHER VIRAL DISEASES: ICD-10-CM

## 2020-09-08 LAB — SARS-COV-2 RNA SPEC QL NAA+PROBE: SIGNIFICANT CHANGE UP

## 2020-09-08 PROCEDURE — U0003: CPT

## 2020-09-09 ENCOUNTER — TRANSCRIPTION ENCOUNTER (OUTPATIENT)
Age: 35
End: 2020-09-09

## 2020-09-09 ENCOUNTER — OUTPATIENT (OUTPATIENT)
Dept: OUTPATIENT SERVICES | Facility: HOSPITAL | Age: 35
LOS: 1 days | End: 2020-09-09
Payer: COMMERCIAL

## 2020-09-09 VITALS
DIASTOLIC BLOOD PRESSURE: 74 MMHG | RESPIRATION RATE: 17 BRPM | HEART RATE: 77 BPM | TEMPERATURE: 98 F | SYSTOLIC BLOOD PRESSURE: 109 MMHG | OXYGEN SATURATION: 99 % | HEIGHT: 60 IN | WEIGHT: 147.05 LBS

## 2020-09-09 DIAGNOSIS — O02.1 MISSED ABORTION: ICD-10-CM

## 2020-09-09 DIAGNOSIS — Z98.890 OTHER SPECIFIED POSTPROCEDURAL STATES: Chronic | ICD-10-CM

## 2020-09-09 DIAGNOSIS — Z98.89 OTHER SPECIFIED POSTPROCEDURAL STATES: Chronic | ICD-10-CM

## 2020-09-09 DIAGNOSIS — Z01.818 ENCOUNTER FOR OTHER PREPROCEDURAL EXAMINATION: ICD-10-CM

## 2020-09-09 DIAGNOSIS — K08.409 PARTIAL LOSS OF TEETH, UNSPECIFIED CAUSE, UNSPECIFIED CLASS: Chronic | ICD-10-CM

## 2020-09-09 PROCEDURE — 86850 RBC ANTIBODY SCREEN: CPT

## 2020-09-09 PROCEDURE — G0463: CPT

## 2020-09-09 PROCEDURE — 86900 BLOOD TYPING SEROLOGIC ABO: CPT

## 2020-09-09 PROCEDURE — 85027 COMPLETE CBC AUTOMATED: CPT

## 2020-09-09 PROCEDURE — 86901 BLOOD TYPING SEROLOGIC RH(D): CPT

## 2020-09-09 RX ORDER — LIDOCAINE HCL 20 MG/ML
0.2 VIAL (ML) INJECTION ONCE
Refills: 0 | Status: DISCONTINUED | OUTPATIENT
Start: 2020-09-10 | End: 2020-09-24

## 2020-09-09 RX ORDER — SODIUM CHLORIDE 9 MG/ML
3 INJECTION INTRAMUSCULAR; INTRAVENOUS; SUBCUTANEOUS EVERY 8 HOURS
Refills: 0 | Status: DISCONTINUED | OUTPATIENT
Start: 2020-09-10 | End: 2020-09-24

## 2020-09-09 NOTE — H&P PST ADULT - HISTORY OF PRESENT ILLNESS
35 year old female with hx of LMP 2020 gyn visit 2020 dx with missed  for procedure.    **COVID  denies travel out of [place]  denies known  contact  denies s/s   COVID swab   negative

## 2020-09-09 NOTE — H&P PST ADULT - NSICDXPASTMEDICALHX_GEN_ALL_CORE_FT
PAST MEDICAL HISTORY:  Anxiety Since dx with meningioma    Migraine without aura and without status migrainosus, not intractable     Mild intermittent asthma without complication only when ill with cold    Missed      Non-healing surgical wound     Palpitations Work up done by Dr. Noel 2018    Syncope, unspecified syncope type Only from intense vomiting from migraine, followed by Bert

## 2020-09-09 NOTE — H&P PST ADULT - NSICDXPASTSURGICALHX_GEN_ALL_CORE_FT
PAST SURGICAL HISTORY:  H/O inguinal hernia repair 2019    H/O:      S/P craniotomy 2019  revision of monisha 10/19    [place] teeth extracted

## 2020-09-10 ENCOUNTER — OUTPATIENT (OUTPATIENT)
Dept: OUTPATIENT SERVICES | Facility: HOSPITAL | Age: 35
LOS: 1 days | End: 2020-09-10
Payer: COMMERCIAL

## 2020-09-10 ENCOUNTER — RESULT REVIEW (OUTPATIENT)
Age: 35
End: 2020-09-10

## 2020-09-10 VITALS
SYSTOLIC BLOOD PRESSURE: 102 MMHG | TEMPERATURE: 98 F | HEIGHT: 60 IN | WEIGHT: 147.05 LBS | OXYGEN SATURATION: 100 % | RESPIRATION RATE: 15 BRPM | DIASTOLIC BLOOD PRESSURE: 88 MMHG | HEART RATE: 70 BPM

## 2020-09-10 VITALS
OXYGEN SATURATION: 100 % | TEMPERATURE: 98 F | SYSTOLIC BLOOD PRESSURE: 100 MMHG | HEART RATE: 67 BPM | RESPIRATION RATE: 19 BRPM

## 2020-09-10 DIAGNOSIS — K08.409 PARTIAL LOSS OF TEETH, UNSPECIFIED CAUSE, UNSPECIFIED CLASS: Chronic | ICD-10-CM

## 2020-09-10 DIAGNOSIS — Z98.89 OTHER SPECIFIED POSTPROCEDURAL STATES: Chronic | ICD-10-CM

## 2020-09-10 DIAGNOSIS — Z98.890 OTHER SPECIFIED POSTPROCEDURAL STATES: Chronic | ICD-10-CM

## 2020-09-10 DIAGNOSIS — O02.1 MISSED ABORTION: ICD-10-CM

## 2020-09-10 PROCEDURE — 59820 CARE OF MISCARRIAGE: CPT

## 2020-09-10 PROCEDURE — 88305 TISSUE EXAM BY PATHOLOGIST: CPT

## 2020-09-10 PROCEDURE — 88264 CHROMOSOME ANALYSIS 20-25: CPT

## 2020-09-10 PROCEDURE — 88280 CHROMOSOME KARYOTYPE STUDY: CPT

## 2020-09-10 PROCEDURE — 88233 TISSUE CULTURE SKIN/BIOPSY: CPT

## 2020-09-10 PROCEDURE — 88305 TISSUE EXAM BY PATHOLOGIST: CPT | Mod: 26

## 2020-09-10 RX ORDER — FENTANYL CITRATE 50 UG/ML
25 INJECTION INTRAVENOUS
Refills: 0 | Status: DISCONTINUED | OUTPATIENT
Start: 2020-09-10 | End: 2020-09-10

## 2020-09-10 RX ORDER — ONDANSETRON 8 MG/1
4 TABLET, FILM COATED ORAL ONCE
Refills: 0 | Status: DISCONTINUED | OUTPATIENT
Start: 2020-09-10 | End: 2020-09-24

## 2020-09-10 RX ORDER — OXYCODONE HYDROCHLORIDE 5 MG/1
5 TABLET ORAL ONCE
Refills: 0 | Status: DISCONTINUED | OUTPATIENT
Start: 2020-09-10 | End: 2020-09-10

## 2020-09-10 RX ORDER — SODIUM CHLORIDE 9 MG/ML
1000 INJECTION, SOLUTION INTRAVENOUS
Refills: 0 | Status: DISCONTINUED | OUTPATIENT
Start: 2020-09-10 | End: 2020-09-24

## 2020-09-10 NOTE — BRIEF OPERATIVE NOTE - NSICDXBRIEFPROCEDURE_GEN_ALL_CORE_FT
PROCEDURES:  Dilation and curettage, uterus, using suction, for missed first trimester  10-Sep-2020 13:01:09  Nam Luna

## 2020-09-10 NOTE — ASU PATIENT PROFILE, ADULT - PSH
H/O inguinal hernia repair  2019  H/O:     S/P craniotomy  2019  revision of monisha 10/19  [place] teeth extracted

## 2020-09-10 NOTE — ASU DISCHARGE PLAN (ADULT/PEDIATRIC) - ***IN THE EVENT THAT YOU DEVELOP A COMPLICATION AND YOU ARE UNABLE TO REACH YOUR OWN PHYSICIAN, YOU MAY CONTACT:
6/28/18  105/190  6/29/18   89/189  6/30/18   141/176  7/1/18      104/160    7/2/18      84/179  7/3/18       101/186  7/4/18       76/190  7/5/18       77/161  7/6/18        98      Per patient, \"I am going to cut back my evening dose to 20 units as I feel jittery when my blood sugar is in the 70s and 80s in the am. Advised patient that I would ask for provider's recommendations.    Statement Selected

## 2020-09-10 NOTE — ASU PATIENT PROFILE, ADULT - PMH
Anxiety  Since dx with meningioma  Migraine without aura and without status migrainosus, not intractable    Mild intermittent asthma without complication  only when ill with cold  Missed     Non-healing surgical wound    Palpitations  Work up done by Dr. Noel 2018  Syncope, unspecified syncope type  Only from intense vomiting from migraine, followed by Bert

## 2020-09-22 PROBLEM — O02.1 MISSED ABORTION: Chronic | Status: ACTIVE | Noted: 2020-09-09

## 2020-09-22 LAB
CHROM ANALY OVERALL INTERP SPEC-IMP: SIGNIFICANT CHANGE UP
SURGICAL PATHOLOGY STUDY: SIGNIFICANT CHANGE UP

## 2020-10-05 ENCOUNTER — APPOINTMENT (OUTPATIENT)
Dept: FAMILY MEDICINE | Facility: CLINIC | Age: 35
End: 2020-10-05
Payer: COMMERCIAL

## 2020-10-05 PROCEDURE — 90686 IIV4 VACC NO PRSV 0.5 ML IM: CPT

## 2020-10-05 PROCEDURE — G0008: CPT

## 2020-10-13 NOTE — ASU PATIENT PROFILE, ADULT - AS SC BRADEN ACTIVITY
Detail Level: Detailed Depth Of Biopsy: dermis Was A Bandage Applied: Yes Size Of Lesion In Cm: 1.2 X Size Of Lesion In Cm: 0 Biopsy Type: H and E Biopsy Method: 15 blade Anesthesia Type: 1% lidocaine with epinephrine Anesthesia Volume In Cc (Will Not Render If 0): 0.5 Hemostasis: Aluminum Chloride Wound Care: Mupirocin Dressing: bandage Destruction After The Procedure: No Type Of Destruction Used: Curettage Curettage Text: The wound bed was treated with curettage after the biopsy was performed. Cryotherapy Text: The wound bed was treated with cryotherapy after the biopsy was performed. Electrodesiccation Text: The wound bed was treated with electrodesiccation after the biopsy was performed. Electrodesiccation And Curettage Text: The wound bed was treated with electrodesiccation and curettage after the biopsy was performed. Silver Nitrate Text: The wound bed was treated with silver nitrate after the biopsy was performed. Lab: 6 Lab Facility: 3 Consent: Written consent was obtained and risks were reviewed including but not limited to scarring, infection, bleeding, scabbing, incomplete removal, nerve damage and allergy to anesthesia. Post-Care Instructions: I reviewed with the patient in detail post-care instructions. Patient is to keep the biopsy site dry overnight, and then keep area clean with clear water, no ointment. Patient may apply hydrogen peroxide soaks to remove any crusting. Notification Instructions: Patient will be notified of biopsy results. However, patient instructed to call the office if not contacted within 2 weeks. Billing Type: Third-Party Bill (4) walks frequently Information: Selecting Yes will display possible errors in your note based on the variables you have selected. This validation is only offered as a suggestion for you. PLEASE NOTE THAT THE VALIDATION TEXT WILL BE REMOVED WHEN YOU FINALIZE YOUR NOTE. IF YOU WANT TO FAX A PRELIMINARY NOTE YOU WILL NEED TO TOGGLE THIS TO 'NO' IF YOU DO NOT WANT IT IN YOUR FAXED NOTE.

## 2020-10-28 ENCOUNTER — RESULT REVIEW (OUTPATIENT)
Age: 35
End: 2020-10-28

## 2020-10-28 ENCOUNTER — APPOINTMENT (OUTPATIENT)
Dept: MRI IMAGING | Facility: IMAGING CENTER | Age: 35
End: 2020-10-28
Payer: COMMERCIAL

## 2020-10-28 ENCOUNTER — OUTPATIENT (OUTPATIENT)
Dept: OUTPATIENT SERVICES | Facility: HOSPITAL | Age: 35
LOS: 1 days | End: 2020-10-28
Payer: COMMERCIAL

## 2020-10-28 ENCOUNTER — APPOINTMENT (OUTPATIENT)
Dept: NEUROSURGERY | Facility: CLINIC | Age: 35
End: 2020-10-28
Payer: COMMERCIAL

## 2020-10-28 DIAGNOSIS — Z00.8 ENCOUNTER FOR OTHER GENERAL EXAMINATION: ICD-10-CM

## 2020-10-28 DIAGNOSIS — Z98.89 OTHER SPECIFIED POSTPROCEDURAL STATES: Chronic | ICD-10-CM

## 2020-10-28 DIAGNOSIS — D32.9 BENIGN NEOPLASM OF MENINGES, UNSPECIFIED: ICD-10-CM

## 2020-10-28 DIAGNOSIS — Z98.890 OTHER SPECIFIED POSTPROCEDURAL STATES: Chronic | ICD-10-CM

## 2020-10-28 DIAGNOSIS — K08.409 PARTIAL LOSS OF TEETH, UNSPECIFIED CAUSE, UNSPECIFIED CLASS: Chronic | ICD-10-CM

## 2020-10-28 PROCEDURE — 70553 MRI BRAIN STEM W/O & W/DYE: CPT | Mod: 26

## 2020-10-28 PROCEDURE — A9585: CPT

## 2020-10-28 PROCEDURE — 99072 ADDL SUPL MATRL&STAF TM PHE: CPT

## 2020-10-28 PROCEDURE — 99212 OFFICE O/P EST SF 10 MIN: CPT

## 2020-10-28 PROCEDURE — 70553 MRI BRAIN STEM W/O & W/DYE: CPT

## 2020-10-28 NOTE — PHYSICAL EXAM
[General Appearance - Alert] : alert [General Appearance - In No Acute Distress] : in no acute distress [General Appearance - Well Nourished] : well nourished [Longitudinal] : longitudinal [Clean] : clean [Dry] : dry [Well-Healed] : well-healed [No Drainage] : without drainage [Normal Skin] : normal [Erythema] : not erythematous [Tender] : not tender [FreeTextEntry1] : Left occipital (post auricular)  [Oriented To Time, Place, And Person] : oriented to person, place, and time [Impaired Insight] : insight and judgment were intact [Affect] : the affect was normal [Motor Tone] : muscle tone was normal in all four extremities [Motor Strength] : muscle strength was normal in all four extremities [Involuntary Movements] : no involuntary movements were seen [Abnormal Walk] : normal gait [Balance] : balance was intact [Intact] : all reflexes within normal limits bilaterally

## 2020-10-28 NOTE — REVIEW OF SYSTEMS
[Facial Weakness] : no facial weakness [Arm Weakness] : no arm weakness [Hand Weakness] : no hand weakness [Leg Weakness] : no leg weakness [Poor Coordination] : good coordination [Difficulty Writing] : no difficulty writing [Difficulties in Speech] : no speech difficulties [Numbness] : no numbness [Tingling] : no tingling [Hypersensitivity] : no hypersensitivity [Dizziness] : no dizziness [Fainting] : no fainting [Lightheadedness] : no lightheadedness [Vertigo] : no vertigo [Difficulty Walking] : no difficulty walking [Inability to Walk] : able to walk [Frequent Falls] : not falling [Limping] : not limping [Negative] : Psychiatric

## 2020-10-28 NOTE — REASON FOR VISIT
[Follow-Up: _____] : a [unfilled] follow-up visit [FreeTextEntry1] : 36 yo lady, \par PMH of CPA meningioma s/p Suboccipital craniotomy and meningioma resection (03/09/2019) \par (had EVD placed and removed postoperatively) \par Pathology: WHO Grade 1 Meningioma (Ki-67: 2-5%) \par Wound revision (10/18/2019) \par \par PostOp 1 year follow up

## 2020-10-28 NOTE — ASSESSMENT
[FreeTextEntry1] : CPA meningioma s/p Craniotomy and resection of the tumor (03/09/2019) & Wound revision (10/18/2019)\par \par MRI done today looks stable. Postoperative change has improved and there's no evidence of recurrence visible. \par Wound completely healed. Neurologically intact \par  \par Plan: \par - Follow-up MRI wwo contrast in 1 year.

## 2020-11-19 NOTE — PRE-ANESTHESIA EVALUATION ADULT - LAST ECHOCARDIOGRAM
Contacted patient after being informed that the patient's monitor had not been transmitting. Patient informed me that his monitor had not been working since the day he had it put on which was 11/13/20 and the patient is currently in [place] and wont be back in until Monday. I apologized to the patient for his experience and told him that I would call him on Monday and we can discuss a time and date [facility] and promised him it would not be the patch (Patient was very clear that he did not want another patch).    6/18

## 2020-12-07 NOTE — PRE-OP CHECKLIST - SKIN PREP
[FreeTextEntry1] : Child is a 21 mo old male here for catch up vaccines. Hep A and Prevnar given today. Child is now fully caught up on vaccines. Mother requesting derm referral for molluscum contagiosum. RTC for 1 year old [facility].
done

## 2020-12-16 PROBLEM — Z87.09 HISTORY OF SORE THROAT: Status: RESOLVED | Noted: 2018-10-24 | Resolved: 2020-12-16

## 2020-12-16 PROBLEM — J06.9 URI WITH COUGH AND CONGESTION: Status: RESOLVED | Noted: 2018-05-25 | Resolved: 2020-12-16

## 2020-12-22 ENCOUNTER — APPOINTMENT (OUTPATIENT)
Dept: FAMILY MEDICINE | Facility: CLINIC | Age: 35
End: 2020-12-22
Payer: COMMERCIAL

## 2020-12-22 VITALS
RESPIRATION RATE: 14 BRPM | HEIGHT: 60 IN | DIASTOLIC BLOOD PRESSURE: 70 MMHG | BODY MASS INDEX: 29.06 KG/M2 | SYSTOLIC BLOOD PRESSURE: 116 MMHG | HEART RATE: 75 BPM | TEMPERATURE: 97.5 F | WEIGHT: 148 LBS | OXYGEN SATURATION: 98 %

## 2020-12-22 DIAGNOSIS — R79.89 OTHER SPECIFIED ABNORMAL FINDINGS OF BLOOD CHEMISTRY: ICD-10-CM

## 2020-12-22 DIAGNOSIS — I47.2 VENTRICULAR TACHYCARDIA: ICD-10-CM

## 2020-12-22 LAB — CYTOLOGY CVX/VAG DOC THIN PREP: NORMAL

## 2020-12-22 PROCEDURE — 99395 PREV VISIT EST AGE 18-39: CPT

## 2020-12-22 PROCEDURE — 99072 ADDL SUPL MATRL&STAF TM PHE: CPT

## 2020-12-22 NOTE — HISTORY OF PRESENT ILLNESS
[FreeTextEntry1] : CPE [de-identified] : ALISTAIR MOBLEY 35 year F s/p left posterior fossa meningioma s/p surgical removal presents for wellness exam. Doing well at this time. Eats well-balanced diet. Tries to exercise. Recently started elliptical for one hour each day. Has chronically elevated prolactin level that fluctuates. Recent MRI this year showed normal size of pituitary. Regular menses. \par

## 2021-02-08 ENCOUNTER — APPOINTMENT (OUTPATIENT)
Dept: PAIN MANAGEMENT | Facility: CLINIC | Age: 36
End: 2021-02-08

## 2021-02-23 NOTE — ASU PATIENT PROFILE, ADULT - BLOOD TRANSFUSION, PREVIOUS, PROFILE
no Benzoyl Peroxide Pregnancy And Lactation Text: This medication is Pregnancy Category C. It is unknown if benzoyl peroxide is excreted in breast milk.

## 2021-03-03 ENCOUNTER — NON-APPOINTMENT (OUTPATIENT)
Age: 36
End: 2021-03-03

## 2021-03-03 ENCOUNTER — TRANSCRIPTION ENCOUNTER (OUTPATIENT)
Age: 36
End: 2021-03-03

## 2021-05-10 ENCOUNTER — APPOINTMENT (OUTPATIENT)
Dept: ANTEPARTUM | Facility: CLINIC | Age: 36
End: 2021-05-10
Payer: COMMERCIAL

## 2021-05-10 ENCOUNTER — ASOB RESULT (OUTPATIENT)
Age: 36
End: 2021-05-10

## 2021-05-10 PROCEDURE — 76811 OB US DETAILED SNGL FETUS: CPT

## 2021-05-10 PROCEDURE — 99072 ADDL SUPL MATRL&STAF TM PHE: CPT

## 2021-05-10 PROCEDURE — 76817 TRANSVAGINAL US OBSTETRIC: CPT

## 2021-06-13 ENCOUNTER — TRANSCRIPTION ENCOUNTER (OUTPATIENT)
Age: 36
End: 2021-06-13

## 2021-06-15 NOTE — ASSESSMENT
[FreeTextEntry1] : This is a 34 yo female with WHO grade I meningioma who is s/p left suboccipital resection. She has excellent recovery and her previous symptoms markedly improved. She is neurologically intact. Recommend\par - daily shower with shampoo \par - clear to drive if patient feels okay\par - continue home mild home exercise as tolerates (no weight lifting/yoga for a month)\par - RTC in May without image\par - MRI brain w/wo in 6 months If you are a smoker, it is important for your health to stop smoking. Please be aware that second hand smoke is also harmful.

## 2021-07-10 ENCOUNTER — TRANSCRIPTION ENCOUNTER (OUTPATIENT)
Age: 36
End: 2021-07-10

## 2021-07-13 ENCOUNTER — APPOINTMENT (OUTPATIENT)
Dept: FAMILY MEDICINE | Facility: CLINIC | Age: 36
End: 2021-07-13
Payer: COMMERCIAL

## 2021-07-13 VITALS — TEMPERATURE: 97.1 F | DIASTOLIC BLOOD PRESSURE: 60 MMHG | SYSTOLIC BLOOD PRESSURE: 102 MMHG

## 2021-07-13 PROCEDURE — 99214 OFFICE O/P EST MOD 30 MIN: CPT

## 2021-07-13 PROCEDURE — 99072 ADDL SUPL MATRL&STAF TM PHE: CPT

## 2021-07-13 NOTE — PHYSICAL EXAM
[Normal Outer Ear/Nose] : the outer ears and nose were normal in appearance [Normal] : affect was normal and insight and judgment were intact [de-identified] : mucus noted

## 2021-07-13 NOTE — REVIEW OF SYSTEMS
[Fatigue] : fatigue [Earache] : earache [Nasal Discharge] : nasal discharge [Sore Throat] : sore throat [Postnasal Drip] : postnasal drip [Cough] : cough [Negative] : Cardiovascular [Swollen Glands] : no swollen glands

## 2021-07-13 NOTE — HISTORY OF PRESENT ILLNESS
[FreeTextEntry8] : ALISTAIR MOBLEY 35 year F s/p left posterior fossa meningioma s/p surgical removal presents for sinus congestion for 2 weeks.Pt is 30 weeks pregnant.Went to UC for left ear pain was given drops. Got slightly better then went to ENT.  Workup NEG but was given Flonase. Started having tight cough, ear pain ,sinus congestion (hx of asthma as child). Used Albuterol with relief.  Admits to facial pain and HA.  Cough slightly yellow discharge. Left  ear popping stopped. No fever or chills.Fully Vaccinated against Covid. Had recent strep and Covid NEG.  Some painful swallowing

## 2021-08-31 NOTE — ASU PATIENT PROFILE, ADULT - HARM RISK FACTORS
C/o urinary hesitancy, frequency, hematuria, and pain x 1 week with decreased appetite, insomnia, lower abd pain, and swollen testicles. Pt denies injury. no

## 2021-09-03 ENCOUNTER — OUTPATIENT (OUTPATIENT)
Dept: OUTPATIENT SERVICES | Facility: HOSPITAL | Age: 36
LOS: 1 days | End: 2021-09-03
Payer: COMMERCIAL

## 2021-09-03 VITALS
OXYGEN SATURATION: 99 % | HEART RATE: 93 BPM | WEIGHT: 169.09 LBS | DIASTOLIC BLOOD PRESSURE: 70 MMHG | TEMPERATURE: 98 F | RESPIRATION RATE: 18 BRPM | SYSTOLIC BLOOD PRESSURE: 109 MMHG | HEIGHT: 60 IN

## 2021-09-03 DIAGNOSIS — Z98.890 OTHER SPECIFIED POSTPROCEDURAL STATES: Chronic | ICD-10-CM

## 2021-09-03 DIAGNOSIS — Z98.89 OTHER SPECIFIED POSTPROCEDURAL STATES: Chronic | ICD-10-CM

## 2021-09-03 DIAGNOSIS — Z33.1 PREGNANT STATE, INCIDENTAL: ICD-10-CM

## 2021-09-03 DIAGNOSIS — K08.409 PARTIAL LOSS OF TEETH, UNSPECIFIED CAUSE, UNSPECIFIED CLASS: Chronic | ICD-10-CM

## 2021-09-03 LAB
ANION GAP SERPL CALC-SCNC: 10 MMOL/L — SIGNIFICANT CHANGE UP (ref 5–17)
BLD GP AB SCN SERPL QL: NEGATIVE — SIGNIFICANT CHANGE UP
BUN SERPL-MCNC: 6 MG/DL — LOW (ref 7–23)
CALCIUM SERPL-MCNC: 9.1 MG/DL — SIGNIFICANT CHANGE UP (ref 8.4–10.5)
CHLORIDE SERPL-SCNC: 106 MMOL/L — SIGNIFICANT CHANGE UP (ref 96–108)
CO2 SERPL-SCNC: 21 MMOL/L — LOW (ref 22–31)
CREAT SERPL-MCNC: 0.56 MG/DL — SIGNIFICANT CHANGE UP (ref 0.5–1.3)
GLUCOSE SERPL-MCNC: 87 MG/DL — SIGNIFICANT CHANGE UP (ref 70–99)
HCT VFR BLD CALC: 31.7 % — LOW (ref 34.5–45)
HGB BLD-MCNC: 10.9 G/DL — LOW (ref 11.5–15.5)
MCHC RBC-ENTMCNC: 32.7 PG — SIGNIFICANT CHANGE UP (ref 27–34)
MCHC RBC-ENTMCNC: 34.4 GM/DL — SIGNIFICANT CHANGE UP (ref 32–36)
MCV RBC AUTO: 95.2 FL — SIGNIFICANT CHANGE UP (ref 80–100)
NRBC # BLD: 0 /100 WBCS — SIGNIFICANT CHANGE UP (ref 0–0)
PLATELET # BLD AUTO: 163 K/UL — SIGNIFICANT CHANGE UP (ref 150–400)
POTASSIUM SERPL-MCNC: 4 MMOL/L — SIGNIFICANT CHANGE UP (ref 3.5–5.3)
POTASSIUM SERPL-SCNC: 4 MMOL/L — SIGNIFICANT CHANGE UP (ref 3.5–5.3)
RBC # BLD: 3.33 M/UL — LOW (ref 3.8–5.2)
RBC # FLD: 12.4 % — SIGNIFICANT CHANGE UP (ref 10.3–14.5)
RH IG SCN BLD-IMP: POSITIVE — SIGNIFICANT CHANGE UP
SODIUM SERPL-SCNC: 137 MMOL/L — SIGNIFICANT CHANGE UP (ref 135–145)
WBC # BLD: 7.82 K/UL — SIGNIFICANT CHANGE UP (ref 3.8–10.5)
WBC # FLD AUTO: 7.82 K/UL — SIGNIFICANT CHANGE UP (ref 3.8–10.5)

## 2021-09-03 PROCEDURE — 80048 BASIC METABOLIC PNL TOTAL CA: CPT

## 2021-09-03 PROCEDURE — 85027 COMPLETE CBC AUTOMATED: CPT

## 2021-09-03 PROCEDURE — G0463: CPT

## 2021-09-03 PROCEDURE — 86900 BLOOD TYPING SEROLOGIC ABO: CPT

## 2021-09-03 PROCEDURE — 86850 RBC ANTIBODY SCREEN: CPT

## 2021-09-03 PROCEDURE — 86901 BLOOD TYPING SEROLOGIC RH(D): CPT

## 2021-09-03 NOTE — OB PST NOTE - PROBLEM SELECTOR PLAN 1
-Scheduled for Repeat  (EDC: 21)  0 2 1 with Matt Hernandez 21  -CBC, BMP, and Type & Screen today  -COVID swab @ [facility]- 9/15/21 -Scheduled for Repeat  (EDC: 21)  0 2 1 with Matt Hernandez 21  -CBC, BMP, and Type & Screen today  -COVID swab @ [facility]- 9/15/21  -Preop instructions provided to the patient and she stated understanding.    -Gatorade protocol instructions provided to the patient and she stated understanding.  -Surgical Scrub provided, along with directions for use.  Patient stated understanding.  -No medications am DOS.  -She was advised to call surgeons office immediately if she begins to experience contractions or her membranes rupture.

## 2021-09-03 NOTE — OB PST NOTE - NSICDXPASTSURGICALHX_GEN_ALL_CORE_FT
PAST SURGICAL HISTORY:  H/O inguinal hernia repair 2019    H/O:      S/P craniotomy 2019  revision of monisha 10/19    [place] teeth extracted      PAST SURGICAL HISTORY:  H/O inguinal hernia repair 2019    H/O:      S/P craniotomy 2019  revision of monisha 10/19    [place] teeth extracted 2014

## 2021-09-03 NOTE — OB PST NOTE - NSHPPHYSICALEXAM_GEN_ALL_CORE
Physical Exam:  · Constitutional:  well-developed; well-groomed; well-nourished; no distress  · Eyes: PERRL; conjunctiva clear  · ENMT: No oral lesions; no gross abnormalities  · Neck:  supple; no JVD  · Breasts:	not examined  · Back:  No deformity or limitation of movement   · Respiratory:  airway patent; breath sounds equal; good air movement; respirations non-labored; clear to auscultation bilaterally  · Cardiovascular:  regular rate and rhythm   · Gastrointestinal:  detailed exam  · GI Normal:  soft; nontender; no distention; bowel sounds normal; no guarding  · Genitourinary: patient refused   · Rectal:  patient refused   · Extremities:  no clubbing; no cyanosis; no pedal edema  · Vascular	Equal and normal pulses (carotid)  · Neurological:  alert and oriented x 3  · Gait/Balance: gait steady  · Skin:  warm and dry; color normal  · Lymph Nodes:  · Posterior Cervical L-normal  · Posterior Cervical R-normal  · Anterior Cervical L-normal  · Anterior Cervical R-normal  · Supraclavicular L-normal  · Supraclavicular R-normal  · Musculoskeletal- normal   · Psychiatric- normal affect; normal behavior  gravid uterus denies s/s of active labor, no swelling noted in bilateral lower extremities

## 2021-09-03 NOTE — OB PST NOTE - HISTORY OF PRESENT ILLNESS
35 y/o female with PMH/PSH (P3 P1 0 2 1) significant for a craniotomy for a benign brain tumor (3/2019 and scar revision 10/2019), , inguinal hernia repair, and claustophobia presents today for presurgical testing for her scheduled repeat  on 21 with Dr. Matt Bill.   She denies recent known COVID exposure, fever, chills, n/v, diarrhea, abdominal pain, cough, SOB, nasal congestion, rhinnorhea, chest pain, palpitations at present, and change in taste/smell.     COVID swab 9/15/21 at [facility]

## 2021-09-03 NOTE — OB PST NOTE - MENTAL HEALTH CONDITIONS/SYMPTOMS, PROFILE
none Mother passed away unexpectedly during her pregnancy (1st trimester).  She states sad but no depressed/none

## 2021-09-03 NOTE — OB PST NOTE - NSICDXFAMILYHX_GEN_ALL_CORE_FT
FAMILY HISTORY:  Mother  Still living? No  FH: breast cancer, Age at diagnosis: Age Unknown  FH: coronary artery disease, Age at diagnosis: Age Unknown

## 2021-09-03 NOTE — OB PST NOTE - NSICDXPASTMEDICALHX_GEN_ALL_CORE_FT
PAST MEDICAL HISTORY:  Anxiety Since dx with meningioma    Migraine without aura and without status migrainosus, not intractable     Mild intermittent asthma without complication only when ill with cold    Missed      Non-healing surgical wound     Palpitations Work up done by Dr. Noel 2018    Syncope, unspecified syncope type Only from intense vomiting from migraine, followed by Bert     PAST MEDICAL HISTORY:  Anxiety Since dx with meningioma    History of claustrophobia -may have difficulty with mask etc.    Migraine without aura and without status migrainosus, not intractable resolved since craniotomy for benigh pola tumor    Mild intermittent asthma without complication only when ill with cold    Missed      Non-healing surgical wound 10/2019- Craniotomy    Palpitations Work up done by Dr. Noel  (resolved)    Syncope, unspecified syncope type Only from intense vomiting from migraine, followed by Bert (resolved since craniotomy for benigh pola tumor)

## 2021-09-03 NOTE — OB PST NOTE - NS_OBGYNHISTORY_OBGYN_ALL_OB_FT
35 y/o female P3 P1 0 2 1 scheduled for a repeat  on 21 with Dr. Matt Bill.  Prior  "epidural did not work", patient states that it kept wearing off and she felt a lot of pain.

## 2021-09-14 PROBLEM — R00.2 PALPITATIONS: Chronic | Status: ACTIVE | Noted: 2019-03-08

## 2021-09-14 PROBLEM — Z86.59 PERSONAL HISTORY OF OTHER MENTAL AND BEHAVIORAL DISORDERS: Chronic | Status: ACTIVE | Noted: 2021-09-03

## 2021-09-14 PROBLEM — R55 SYNCOPE AND COLLAPSE: Chronic | Status: ACTIVE | Noted: 2019-03-08

## 2021-09-14 PROBLEM — G43.009 MIGRAINE WITHOUT AURA, NOT INTRACTABLE, WITHOUT STATUS MIGRAINOSUS: Chronic | Status: ACTIVE | Noted: 2019-03-08

## 2021-09-14 PROBLEM — T81.89XA OTHER COMPLICATIONS OF PROCEDURES, NOT ELSEWHERE CLASSIFIED, INITIAL ENCOUNTER: Chronic | Status: ACTIVE | Noted: 2019-10-14

## 2021-09-15 ENCOUNTER — OUTPATIENT (OUTPATIENT)
Dept: OUTPATIENT SERVICES | Facility: HOSPITAL | Age: 36
LOS: 1 days | End: 2021-09-15
Payer: COMMERCIAL

## 2021-09-15 DIAGNOSIS — Z11.52 ENCOUNTER FOR SCREENING FOR COVID-19: ICD-10-CM

## 2021-09-15 DIAGNOSIS — Z98.890 OTHER SPECIFIED POSTPROCEDURAL STATES: Chronic | ICD-10-CM

## 2021-09-15 DIAGNOSIS — Z98.89 OTHER SPECIFIED POSTPROCEDURAL STATES: Chronic | ICD-10-CM

## 2021-09-15 DIAGNOSIS — K08.409 PARTIAL LOSS OF TEETH, UNSPECIFIED CAUSE, UNSPECIFIED CLASS: Chronic | ICD-10-CM

## 2021-09-15 LAB — SARS-COV-2 RNA SPEC QL NAA+PROBE: SIGNIFICANT CHANGE UP

## 2021-09-15 PROCEDURE — C9803: CPT

## 2021-09-15 PROCEDURE — U0005: CPT

## 2021-09-15 PROCEDURE — U0003: CPT

## 2021-09-16 ENCOUNTER — TRANSCRIPTION ENCOUNTER (OUTPATIENT)
Age: 36
End: 2021-09-16

## 2021-09-17 ENCOUNTER — INPATIENT (INPATIENT)
Facility: HOSPITAL | Age: 36
LOS: 1 days | Discharge: HOME | End: 2021-09-19
Attending: OBSTETRICS & GYNECOLOGY | Admitting: OBSTETRICS & GYNECOLOGY
Payer: COMMERCIAL

## 2021-09-17 VITALS
DIASTOLIC BLOOD PRESSURE: 64 MMHG | RESPIRATION RATE: 18 BRPM | TEMPERATURE: 97 F | SYSTOLIC BLOOD PRESSURE: 124 MMHG | HEART RATE: 90 BPM

## 2021-09-17 DIAGNOSIS — Z98.890 OTHER SPECIFIED POSTPROCEDURAL STATES: Chronic | ICD-10-CM

## 2021-09-17 DIAGNOSIS — K08.409 PARTIAL LOSS OF TEETH, UNSPECIFIED CAUSE, UNSPECIFIED CLASS: Chronic | ICD-10-CM

## 2021-09-17 DIAGNOSIS — Z98.89 OTHER SPECIFIED POSTPROCEDURAL STATES: Chronic | ICD-10-CM

## 2021-09-17 DIAGNOSIS — Z33.1 PREGNANT STATE, INCIDENTAL: ICD-10-CM

## 2021-09-17 LAB
COVID-19 SPIKE DOMAIN AB INTERP: POSITIVE
COVID-19 SPIKE DOMAIN ANTIBODY RESULT: >250 U/ML — HIGH
SARS-COV-2 IGG+IGM SERPL QL IA: >250 U/ML — HIGH
SARS-COV-2 IGG+IGM SERPL QL IA: POSITIVE
T PALLIDUM AB TITR SER: NEGATIVE — SIGNIFICANT CHANGE UP

## 2021-09-17 PROCEDURE — 59514 CESAREAN DELIVERY ONLY: CPT | Mod: AS,U9

## 2021-09-17 RX ORDER — SODIUM CHLORIDE 9 MG/ML
500 INJECTION, SOLUTION INTRAVENOUS ONCE
Refills: 0 | Status: COMPLETED | OUTPATIENT
Start: 2021-09-17 | End: 2021-09-17

## 2021-09-17 RX ORDER — SODIUM CHLORIDE 9 MG/ML
1000 INJECTION, SOLUTION INTRAVENOUS
Refills: 0 | Status: DISCONTINUED | OUTPATIENT
Start: 2021-09-17 | End: 2021-09-19

## 2021-09-17 RX ORDER — ACETAMINOPHEN 500 MG
1000 TABLET ORAL ONCE
Refills: 0 | Status: COMPLETED | OUTPATIENT
Start: 2021-09-17 | End: 2021-09-17

## 2021-09-17 RX ORDER — NALOXONE HYDROCHLORIDE 4 MG/.1ML
0.1 SPRAY NASAL
Refills: 0 | Status: DISCONTINUED | OUTPATIENT
Start: 2021-09-17 | End: 2021-09-19

## 2021-09-17 RX ORDER — ACETAMINOPHEN 500 MG
975 TABLET ORAL
Refills: 0 | Status: DISCONTINUED | OUTPATIENT
Start: 2021-09-17 | End: 2021-09-19

## 2021-09-17 RX ORDER — FAMOTIDINE 10 MG/ML
20 INJECTION INTRAVENOUS ONCE
Refills: 0 | Status: COMPLETED | OUTPATIENT
Start: 2021-09-17 | End: 2021-09-17

## 2021-09-17 RX ORDER — KETOROLAC TROMETHAMINE 30 MG/ML
30 SYRINGE (ML) INJECTION EVERY 6 HOURS
Refills: 0 | Status: DISCONTINUED | OUTPATIENT
Start: 2021-09-17 | End: 2021-09-19

## 2021-09-17 RX ORDER — CITRIC ACID/SODIUM CITRATE 300-500 MG
15 SOLUTION, ORAL ORAL ONCE
Refills: 0 | Status: COMPLETED | OUTPATIENT
Start: 2021-09-17 | End: 2021-09-17

## 2021-09-17 RX ORDER — CEFAZOLIN SODIUM 1 G
2000 VIAL (EA) INJECTION ONCE
Refills: 0 | Status: DISCONTINUED | OUTPATIENT
Start: 2021-09-17 | End: 2021-09-17

## 2021-09-17 RX ORDER — SIMETHICONE 80 MG/1
80 TABLET, CHEWABLE ORAL EVERY 4 HOURS
Refills: 0 | Status: DISCONTINUED | OUTPATIENT
Start: 2021-09-17 | End: 2021-09-19

## 2021-09-17 RX ORDER — OXYTOCIN 10 UNIT/ML
333.33 VIAL (ML) INJECTION
Qty: 20 | Refills: 0 | Status: DISCONTINUED | OUTPATIENT
Start: 2021-09-17 | End: 2021-09-17

## 2021-09-17 RX ORDER — METOCLOPRAMIDE HCL 10 MG
10 TABLET ORAL ONCE
Refills: 0 | Status: COMPLETED | OUTPATIENT
Start: 2021-09-17 | End: 2021-09-17

## 2021-09-17 RX ORDER — IBUPROFEN 200 MG
600 TABLET ORAL EVERY 6 HOURS
Refills: 0 | Status: COMPLETED | OUTPATIENT
Start: 2021-09-17 | End: 2022-08-16

## 2021-09-17 RX ORDER — NALBUPHINE HYDROCHLORIDE 10 MG/ML
2.5 INJECTION, SOLUTION INTRAMUSCULAR; INTRAVENOUS; SUBCUTANEOUS EVERY 6 HOURS
Refills: 0 | Status: DISCONTINUED | OUTPATIENT
Start: 2021-09-17 | End: 2021-09-19

## 2021-09-17 RX ORDER — OXYCODONE HYDROCHLORIDE 5 MG/1
5 TABLET ORAL
Refills: 0 | Status: COMPLETED | OUTPATIENT
Start: 2021-09-17 | End: 2021-09-24

## 2021-09-17 RX ORDER — HEPARIN SODIUM 5000 [USP'U]/ML
5000 INJECTION INTRAVENOUS; SUBCUTANEOUS EVERY 12 HOURS
Refills: 0 | Status: DISCONTINUED | OUTPATIENT
Start: 2021-09-17 | End: 2021-09-19

## 2021-09-17 RX ORDER — TETANUS TOXOID, REDUCED DIPHTHERIA TOXOID AND ACELLULAR PERTUSSIS VACCINE, ADSORBED 5; 2.5; 8; 8; 2.5 [IU]/.5ML; [IU]/.5ML; UG/.5ML; UG/.5ML; UG/.5ML
0.5 SUSPENSION INTRAMUSCULAR ONCE
Refills: 0 | Status: DISCONTINUED | OUTPATIENT
Start: 2021-09-17 | End: 2021-09-19

## 2021-09-17 RX ORDER — INFLUENZA VIRUS VACCINE 15; 15; 15; 15 UG/.5ML; UG/.5ML; UG/.5ML; UG/.5ML
0.5 SUSPENSION INTRAMUSCULAR ONCE
Refills: 0 | Status: DISCONTINUED | OUTPATIENT
Start: 2021-09-17 | End: 2021-09-19

## 2021-09-17 RX ORDER — DIPHENHYDRAMINE HCL 50 MG
25 CAPSULE ORAL EVERY 6 HOURS
Refills: 0 | Status: DISCONTINUED | OUTPATIENT
Start: 2021-09-17 | End: 2021-09-19

## 2021-09-17 RX ORDER — ONDANSETRON 8 MG/1
4 TABLET, FILM COATED ORAL EVERY 6 HOURS
Refills: 0 | Status: DISCONTINUED | OUTPATIENT
Start: 2021-09-17 | End: 2021-09-19

## 2021-09-17 RX ORDER — LANOLIN
1 OINTMENT (GRAM) TOPICAL EVERY 6 HOURS
Refills: 0 | Status: DISCONTINUED | OUTPATIENT
Start: 2021-09-17 | End: 2021-09-19

## 2021-09-17 RX ORDER — MAGNESIUM HYDROXIDE 400 MG/1
30 TABLET, CHEWABLE ORAL
Refills: 0 | Status: DISCONTINUED | OUTPATIENT
Start: 2021-09-17 | End: 2021-09-19

## 2021-09-17 RX ORDER — OXYTOCIN 10 UNIT/ML
333.33 VIAL (ML) INJECTION
Qty: 20 | Refills: 0 | Status: DISCONTINUED | OUTPATIENT
Start: 2021-09-17 | End: 2021-09-19

## 2021-09-17 RX ORDER — SODIUM CHLORIDE 9 MG/ML
1000 INJECTION, SOLUTION INTRAVENOUS
Refills: 0 | Status: DISCONTINUED | OUTPATIENT
Start: 2021-09-17 | End: 2021-09-17

## 2021-09-17 RX ORDER — OXYCODONE HYDROCHLORIDE 5 MG/1
5 TABLET ORAL ONCE
Refills: 0 | Status: DISCONTINUED | OUTPATIENT
Start: 2021-09-17 | End: 2021-09-19

## 2021-09-17 RX ORDER — MORPHINE SULFATE 50 MG/1
0.1 CAPSULE, EXTENDED RELEASE ORAL ONCE
Refills: 0 | Status: DISCONTINUED | OUTPATIENT
Start: 2021-09-17 | End: 2021-09-18

## 2021-09-17 RX ORDER — DEXAMETHASONE 0.5 MG/5ML
4 ELIXIR ORAL EVERY 6 HOURS
Refills: 0 | Status: DISCONTINUED | OUTPATIENT
Start: 2021-09-17 | End: 2021-09-19

## 2021-09-17 RX ORDER — SODIUM CHLORIDE 9 MG/ML
1000 INJECTION, SOLUTION INTRAVENOUS ONCE
Refills: 0 | Status: COMPLETED | OUTPATIENT
Start: 2021-09-17 | End: 2021-09-17

## 2021-09-17 RX ORDER — CHLORHEXIDINE GLUCONATE 213 G/1000ML
1 SOLUTION TOPICAL ONCE
Refills: 0 | Status: COMPLETED | OUTPATIENT
Start: 2021-09-17 | End: 2021-09-17

## 2021-09-17 RX ADMIN — HEPARIN SODIUM 5000 UNIT(S): 5000 INJECTION INTRAVENOUS; SUBCUTANEOUS at 18:40

## 2021-09-17 RX ADMIN — Medication 975 MILLIGRAM(S): at 19:20

## 2021-09-17 RX ADMIN — Medication 30 MILLIGRAM(S): at 21:23

## 2021-09-17 RX ADMIN — Medication 975 MILLIGRAM(S): at 18:40

## 2021-09-17 RX ADMIN — Medication 30 MILLIGRAM(S): at 15:37

## 2021-09-17 RX ADMIN — SODIUM CHLORIDE 2000 MILLILITER(S): 9 INJECTION, SOLUTION INTRAVENOUS at 08:43

## 2021-09-17 RX ADMIN — CHLORHEXIDINE GLUCONATE 1 APPLICATION(S): 213 SOLUTION TOPICAL at 08:43

## 2021-09-17 RX ADMIN — Medication 15 MILLILITER(S): at 08:43

## 2021-09-17 RX ADMIN — Medication 10 MILLIGRAM(S): at 19:36

## 2021-09-17 RX ADMIN — Medication 30 MILLIGRAM(S): at 22:00

## 2021-09-17 RX ADMIN — Medication 30 MILLIGRAM(S): at 16:00

## 2021-09-17 RX ADMIN — SODIUM CHLORIDE 500 MILLILITER(S): 9 INJECTION, SOLUTION INTRAVENOUS at 13:15

## 2021-09-17 RX ADMIN — ONDANSETRON 4 MILLIGRAM(S): 8 TABLET, FILM COATED ORAL at 15:29

## 2021-09-17 RX ADMIN — FAMOTIDINE 20 MILLIGRAM(S): 10 INJECTION INTRAVENOUS at 08:43

## 2021-09-17 RX ADMIN — Medication 1000 MILLIGRAM(S): at 13:13

## 2021-09-17 RX ADMIN — Medication 400 MILLIGRAM(S): at 12:14

## 2021-09-17 NOTE — OB PROVIDER DELIVERY SUMMARY - NSPROVIDERDELIVERYNOTE_OBGYN_ALL_OB_FT
scheduled rLTCS   Viable male infant, Apgars by Peds,  weight 3804g  Hysterotomy closed in 1 layer using Caprosyn  Intercede placed over anterior surface of uterus  Grossly normal uterus, tubes, and ovaries  Abdomen closed in standard fashion  Pt and infant to recovery in stable condition  EBL: 500  IVF: 1500   UOP: 225

## 2021-09-17 NOTE — OB PROVIDER H&P - ASSESSMENT
A 36 y.o  @39wks comes in for a repeat .    Admit to L&D  EFM/Thomson  Routine labs  IVFluids  NPO/Bicitra  Anesthesia c/s  For rLTCS  Dr. Bill aware

## 2021-09-17 NOTE — OB RN PATIENT PROFILE - NSICDXPASTMEDICALHX_GEN_ALL_CORE_FT
PAST MEDICAL HISTORY:  Anxiety Since dx with meningioma    History of claustrophobia -may have difficulty with mask etc.    Migraine without aura and without status migrainosus, not intractable resolved since craniotomy for benigh pola tumor    Mild intermittent asthma without complication only when ill with cold    Missed      Non-healing surgical wound 10/2019- Craniotomy    Palpitations Work up done by Dr. Noel  (resolved)    Syncope, unspecified syncope type Only from intense vomiting from migraine, followed by Bert (resolved since craniotomy for benigh pola tumor)

## 2021-09-17 NOTE — OB PROVIDER DELIVERY SUMMARY - AS DELIV COMPLICATIONS OB
none 7 yo 11 mo old girl PMH CP brought by her mother for evaluation of a chipped tooth.  According to mom, the child chipped her left frontal tooth on a bathtub over the weekend, no additional injuries or complaints,  The child appears well, NAD, exam shows fractured left upper frontal incisor, no lip laceration.  Transfer to dental clinic for further care.  Mom  is amenable with the plan.

## 2021-09-17 NOTE — OB RN PATIENT PROFILE - NSICDXPASTSURGICALHX_GEN_ALL_CORE_FT
PAST SURGICAL HISTORY:  H/O inguinal hernia repair 2019    H/O:      S/P craniotomy 2019  revision of monisha 10/19    [place] teeth extracted 2014

## 2021-09-17 NOTE — OB RN PATIENT PROFILE - MENTAL HEALTH CONDITIONS/SYMPTOMS, PROFILE
Mother passed away unexpectedly during her pregnancy (1st trimester).  She states sad but no depressed/none

## 2021-09-17 NOTE — OB PROVIDER DELIVERY SUMMARY - NSSELHIDDEN_OBGYN_ALL_OB_FT
[NS_DeliveryAttending1_OBGYN_ALL_OB_FT:[id number]],[NS_DeliveryAssist1_OBGYN_ALL_OB_FT:[id number]]

## 2021-09-17 NOTE — OB RN INTRAOPERATIVE NOTE - NSSELHIDDEN_OBGYN_ALL_OB_FT
[NS_DeliveryAttending1_OBGYN_ALL_OB_FT:[id number]],[NS_DeliveryAssist1_OBGYN_ALL_OB_FT:[id number]==]

## 2021-09-17 NOTE — OB NEONATOLOGY/PEDIATRICIAN DELIVERY SUMMARY - NSPEDSNEONOTESA_OBGYN_ALL_OB_FT
Baby is a 39 wk GA male born to a 38 y/o  mother via C/S. Maternal history significant for craniotomy of benign tumor. Prenatal history uncomplicated. Maternal BT A+. PNL neg, NR, and immune. GBS neg on . AROM at delivery on , clear fluids. Baby born vigorous and crying spontaneously. WDSS. Apgars 99. EOS 0.01. Mom plans to breastfeed, would like hepB vaccine. Circumcision deferred. COVID status negative.    BW: 3804g  TOB: 0952  : 21    Gen: NAD; well-appearing  HEENT: NC/AT; AFOF; ears and nose normally set; no tags ; oropharynx clear  Skin: pink, warm, well-perfused, no rash  Resp: CTAB, even, non-labored breathing  Cardiac: RRR, normal S1 and S2; no murmurs; 2+ femoral pulses b/l  Abd: soft, NT/ND; +BS; no HSM; umbilicus 3 vessels  Extremities: FROM; no crepitus; Hips: negative O/B  : Rodrigue I; no abnormalities; no hernia; anus patent  Neuro: +geoffrey, suck, grasp, Babinski; good tone throughout

## 2021-09-17 NOTE — OB RN DELIVERY SUMMARY - NSSELHIDDEN_OBGYN_ALL_OB_FT
[NS_DeliveryAttending1_OBGYN_ALL_OB_FT:[id number]],[NS_DeliveryAssist1_OBGYN_ALL_OB_FT:[id number]==]

## 2021-09-17 NOTE — OB RN PATIENT PROFILE - NS_OBGYNHISTORY_OBGYN_ALL_OB_FT
37 y/o female P3 P1 0 2 1 scheduled for a repeat  on 21 with Dr. Matt Bill.  Prior  "epidural did not work", patient states that it kept wearing off and she felt a lot of pain.

## 2021-09-17 NOTE — OB PROVIDER H&P - NSICDXPASTSURGICALHX_GEN_ALL_CORE_FT
PAST SURGICAL HISTORY:  H/O inguinal hernia repair 2019    H/O:      S/P craniotomy 2019  revision of monisha 10/19    [place] teeth extracted 2014

## 2021-09-17 NOTE — OB RN DELIVERY SUMMARY - NS_SEPSISRSKCALC_OBGYN_ALL_OB_FT
GBS status in the 'Prenatal Lab tests/results section' on the OB RN Patient Profile must be documented.   EOS calculated successfully. EOS Risk Factor: 0.5/1000 live births ([facility] national incidence); GA=39w;Temp=97.34; ROM=0; GBS='Negative'; Antibiotics='No antibiotics or any antibiotics < 2 hrs prior to birth'

## 2021-09-17 NOTE — OB PROVIDER H&P - NSHPPHYSICALEXAM_GEN_ALL_CORE
Gen NAD  Vital Signs Last 24 Hrs  T(C): 36.3 (17 Sep 2021 07:54), Max: 36.3 (17 Sep 2021 07:30)  T(F): 97.3 (17 Sep 2021 07:54), Max: 97.34 (17 Sep 2021 07:30)  HR: 89 (17 Sep 2021 08:29) (64 - 101)  BP: 124/64 (17 Sep 2021 07:54) (124/64 - 124/64)  BP(mean): --  RR: 18 (17 Sep 2021 07:54) (18 - 18)  SpO2: 99% (17 Sep 2021 08:29) (99% - 100%)  CV RRR  Lungs CTA B/L  Abd soft/NT, gravid  Ext soft, NT b/l  / mod tami/ (+) accel/ (-) decel  Blum occ ctx

## 2021-09-17 NOTE — OB RN PATIENT PROFILE - AS SC BRADEN SENSORY
Update called to Bassam Trotter, patient's mother. Reviewed stable vitals. Patient have pain in head as expected. No new plan for intervention from neurosurgery standpoint. Asked Ian Madrid to text Shakira Ceballos so she can call her back when she is feeling better. (4) no impairment

## 2021-09-17 NOTE — OB PROVIDER H&P - HISTORY OF PRESENT ILLNESS
A 36 y.o  @39wks comes in for a repeat .  Pt has normal baby movement. Pt denies contractions and SROM and is GBS(-). Pt states that she is willing to accept blood transfusions if necessary.     Ax: Dermabond (hives/blisters), Shellfish    PMHx: None     Meds: None    Surgical Hx:  - Craniotomy (3/9/2019)  -- Incision fixture ()  - Hernia repair (2019), no complications    GYN Hx:  -Abnormal Pap ()    OB Hx:  [id number], full term, 6lbs 5oz, pt states that the epidural was wearing off in the middle of surgery and she was in a lot of pain throughout the procedure   --incision site infection due to Dermabond allergy, repeat surgery was needed   - Miscarriage, D/C 1st trimester, no complications

## 2021-09-18 LAB
HCT VFR BLD CALC: 30.7 % — LOW (ref 34.5–45)
HGB BLD-MCNC: 10.3 G/DL — LOW (ref 11.5–15.5)
MCHC RBC-ENTMCNC: 32.8 PG — SIGNIFICANT CHANGE UP (ref 27–34)
MCHC RBC-ENTMCNC: 33.6 GM/DL — SIGNIFICANT CHANGE UP (ref 32–36)
MCV RBC AUTO: 97.8 FL — SIGNIFICANT CHANGE UP (ref 80–100)
NRBC # BLD: 0 /100 WBCS — SIGNIFICANT CHANGE UP (ref 0–0)
PLATELET # BLD AUTO: 179 K/UL — SIGNIFICANT CHANGE UP (ref 150–400)
RBC # BLD: 3.14 M/UL — LOW (ref 3.8–5.2)
RBC # FLD: 12.3 % — SIGNIFICANT CHANGE UP (ref 10.3–14.5)
WBC # BLD: 12.21 K/UL — HIGH (ref 3.8–10.5)
WBC # FLD AUTO: 12.21 K/UL — HIGH (ref 3.8–10.5)

## 2021-09-18 RX ORDER — IBUPROFEN 200 MG
600 TABLET ORAL EVERY 6 HOURS
Refills: 0 | Status: DISCONTINUED | OUTPATIENT
Start: 2021-09-18 | End: 2021-09-19

## 2021-09-18 RX ORDER — OXYCODONE HYDROCHLORIDE 5 MG/1
5 TABLET ORAL
Refills: 0 | Status: DISCONTINUED | OUTPATIENT
Start: 2021-09-18 | End: 2021-09-19

## 2021-09-18 RX ADMIN — Medication 975 MILLIGRAM(S): at 00:30

## 2021-09-18 RX ADMIN — SIMETHICONE 80 MILLIGRAM(S): 80 TABLET, CHEWABLE ORAL at 23:59

## 2021-09-18 RX ADMIN — Medication 600 MILLIGRAM(S): at 21:30

## 2021-09-18 RX ADMIN — Medication 975 MILLIGRAM(S): at 13:00

## 2021-09-18 RX ADMIN — Medication 600 MILLIGRAM(S): at 21:06

## 2021-09-18 RX ADMIN — Medication 600 MILLIGRAM(S): at 10:00

## 2021-09-18 RX ADMIN — Medication 975 MILLIGRAM(S): at 06:33

## 2021-09-18 RX ADMIN — Medication 600 MILLIGRAM(S): at 09:23

## 2021-09-18 RX ADMIN — Medication 975 MILLIGRAM(S): at 23:58

## 2021-09-18 RX ADMIN — Medication 975 MILLIGRAM(S): at 18:28

## 2021-09-18 RX ADMIN — Medication 600 MILLIGRAM(S): at 16:00

## 2021-09-18 RX ADMIN — Medication 975 MILLIGRAM(S): at 17:43

## 2021-09-18 RX ADMIN — SIMETHICONE 80 MILLIGRAM(S): 80 TABLET, CHEWABLE ORAL at 16:10

## 2021-09-18 RX ADMIN — Medication 30 MILLIGRAM(S): at 03:30

## 2021-09-18 RX ADMIN — OXYCODONE HYDROCHLORIDE 5 MILLIGRAM(S): 5 TABLET ORAL at 15:00

## 2021-09-18 RX ADMIN — Medication 30 MILLIGRAM(S): at 03:02

## 2021-09-18 RX ADMIN — OXYCODONE HYDROCHLORIDE 5 MILLIGRAM(S): 5 TABLET ORAL at 14:15

## 2021-09-18 RX ADMIN — Medication 975 MILLIGRAM(S): at 12:14

## 2021-09-18 RX ADMIN — Medication 975 MILLIGRAM(S): at 00:00

## 2021-09-18 RX ADMIN — HEPARIN SODIUM 5000 UNIT(S): 5000 INJECTION INTRAVENOUS; SUBCUTANEOUS at 06:02

## 2021-09-18 RX ADMIN — Medication 975 MILLIGRAM(S): at 06:01

## 2021-09-18 RX ADMIN — Medication 600 MILLIGRAM(S): at 15:00

## 2021-09-18 RX ADMIN — HEPARIN SODIUM 5000 UNIT(S): 5000 INJECTION INTRAVENOUS; SUBCUTANEOUS at 17:44

## 2021-09-18 NOTE — CHART NOTE - NSCHARTNOTEFT_GEN_A_CORE
Day 1 of Anesthesia Pain Management Service    SUBJECTIVE:  Pain Scale Score:          [X] Refer to charted pain scores    THERAPY:    s/p neuraxial duramorph      MEDICATIONS  (STANDING):  acetaminophen   Tablet .. 975 milliGRAM(s) Oral <User Schedule>  diphtheria/tetanus/pertussis (acellular) Vaccine (ADAcel) 0.5 milliLiter(s) IntraMuscular once  heparin   Injectable 5000 Unit(s) SubCutaneous every 12 hours  ibuprofen  Tablet. 600 milliGRAM(s) Oral every 6 hours  influenza   Vaccine 0.5 milliLiter(s) IntraMuscular once  ketorolac   Injectable 30 milliGRAM(s) IV Push every 6 hours  lactated ringers. 1000 milliLiter(s) (125 mL/Hr) IV Continuous <Continuous>  oxytocin Infusion 333.333 milliUNIT(s)/Min (1000 mL/Hr) IV Continuous <Continuous>    MEDICATIONS  (PRN):  dexAMETHasone  Injectable 4 milliGRAM(s) IV Push every 6 hours PRN Nausea  diphenhydrAMINE 25 milliGRAM(s) Oral every 6 hours PRN Pruritus  lanolin Ointment 1 Application(s) Topical every 6 hours PRN Sore Nipples  magnesium hydroxide Suspension 30 milliLiter(s) Oral two times a day PRN Constipation  nalbuphine Injectable 2.5 milliGRAM(s) IV Push every 6 hours PRN Pruritus  naloxone Injectable 0.1 milliGRAM(s) IV Push every 3 minutes PRN For ANY of the following changes in patient status:  A. Breaths Per Minute LESS THAN 10, B. Oxygen saturation LESS THAN 90%, C. Sedation score of 6 for Stop After: 4 Times  ondansetron Injectable 4 milliGRAM(s) IV Push every 6 hours PRN Nausea  oxyCODONE    IR 5 milliGRAM(s) Oral once PRN Moderate to Severe Pain (4-10)  oxyCODONE    IR 5 milliGRAM(s) Oral every 3 hours PRN Moderate to Severe Pain (4-10)  simethicone 80 milliGRAM(s) Chew every 4 hours PRN Gas      OBJECTIVE:    Sedation:        	[X] Alert  	[ ] Drowsy    [ ] Asleep       [ ] Unresponsive    Side Effects:	[X] None	[ ] Nausea	[ ] Vomiting         [ ] Pruritus  		[ ] Weakness            [ ] Numbness	          [ ] Other:    Vital Signs Last 24 Hrs  T(C): 36.7 (18 Sep 2021 13:30), Max: 36.9 (17 Sep 2021 18:40)  T(F): 98.1 (18 Sep 2021 13:30), Max: 98.5 (17 Sep 2021 18:40)  HR: 64 (18 Sep 2021 13:30) (64 - 76)  BP: 97/62 (18 Sep 2021 13:30) (97/62 - 109/62)  BP(mean): --  RR: 18 (18 Sep 2021 13:30) (17 - 18)  SpO2: 99% (18 Sep 2021 13:30) (98% - 99%)    ASSESSMENT/ PLAN  [X] Patient transitioned to prn analgesics  [X] Pain management per primary service, pain service to sign off   [X]Documentation and Verification of current medications

## 2021-09-18 NOTE — PROGRESS NOTE ADULT - ASSESSMENT
A/P: 35yo POD#1 s/p LTCS.  Patient is stable and doing well post-operatively.    - Continue regular diet.  - Increase ambulation, SCDs when not ambulating, Heparin for DVT ppx  - Continue motrin, tylenol, oxycodone PRN for pain control  - F/u AM CBC    CARRIE Quiros PGY1

## 2021-09-19 ENCOUNTER — TRANSCRIPTION ENCOUNTER (OUTPATIENT)
Age: 36
End: 2021-09-19

## 2021-09-19 VITALS
RESPIRATION RATE: 18 BRPM | HEART RATE: 69 BPM | SYSTOLIC BLOOD PRESSURE: 98 MMHG | DIASTOLIC BLOOD PRESSURE: 64 MMHG | TEMPERATURE: 98 F | OXYGEN SATURATION: 98 %

## 2021-09-19 PROCEDURE — 86900 BLOOD TYPING SEROLOGIC ABO: CPT

## 2021-09-19 PROCEDURE — 85025 COMPLETE CBC W/AUTO DIFF WBC: CPT

## 2021-09-19 PROCEDURE — C1765: CPT

## 2021-09-19 PROCEDURE — 86850 RBC ANTIBODY SCREEN: CPT

## 2021-09-19 PROCEDURE — 86780 TREPONEMA PALLIDUM: CPT

## 2021-09-19 PROCEDURE — 59025 FETAL NON-STRESS TEST: CPT

## 2021-09-19 PROCEDURE — 86769 SARS-COV-2 COVID-19 ANTIBODY: CPT

## 2021-09-19 PROCEDURE — 59050 FETAL MONITOR W/REPORT: CPT

## 2021-09-19 PROCEDURE — 86901 BLOOD TYPING SEROLOGIC RH(D): CPT

## 2021-09-19 RX ORDER — PREGABALIN 225 MG/1
1 CAPSULE ORAL
Qty: 0 | Refills: 0 | DISCHARGE

## 2021-09-19 RX ORDER — CHOLECALCIFEROL (VITAMIN D3) 125 MCG
1 CAPSULE ORAL
Qty: 0 | Refills: 0 | DISCHARGE

## 2021-09-19 RX ADMIN — HEPARIN SODIUM 5000 UNIT(S): 5000 INJECTION INTRAVENOUS; SUBCUTANEOUS at 06:26

## 2021-09-19 RX ADMIN — Medication 600 MILLIGRAM(S): at 09:57

## 2021-09-19 RX ADMIN — Medication 975 MILLIGRAM(S): at 06:26

## 2021-09-19 RX ADMIN — Medication 975 MILLIGRAM(S): at 12:38

## 2021-09-19 RX ADMIN — Medication 975 MILLIGRAM(S): at 01:00

## 2021-09-19 RX ADMIN — Medication 600 MILLIGRAM(S): at 03:35

## 2021-09-19 RX ADMIN — Medication 975 MILLIGRAM(S): at 06:55

## 2021-09-19 RX ADMIN — Medication 975 MILLIGRAM(S): at 12:08

## 2021-09-19 RX ADMIN — Medication 600 MILLIGRAM(S): at 03:04

## 2021-09-19 RX ADMIN — Medication 600 MILLIGRAM(S): at 09:27

## 2021-09-19 NOTE — DISCHARGE NOTE OB - BRAND OF COVID-19 VACCINATION
Spine appears normal, range of motion is not limited, no muscle or joint tenderness Moderna dose 1 and 2

## 2021-09-19 NOTE — PROGRESS NOTE ADULT - ASSESSMENT
A/P: 37yo   now POD#2 s/p rLTCS.  Patient is stable and doing well post-operatively.    - Continue regular diet.  - Increase ambulation.  - Continue motrin, tylenol, oxycodone PRN for pain control.   - Discharge planning.    Savannah Thorne, PGY1

## 2021-09-19 NOTE — DISCHARGE NOTE OB - CARE PROVIDER_API CALL
non-verbal indicators of pain/discomfort absent
Matt Bill)  Obstetrics and Gynecology  [address] [place] [address], [address]  [place] [address]  Phone: [phone]  Fax: [phone]  Follow Up Time:

## 2021-09-19 NOTE — PROGRESS NOTE ADULT - SUBJECTIVE AND OBJECTIVE BOX
S: Patient doing well. No complaints. Minimal lochia. Pain controlled.    O: Vital Signs Last 24 Hrs  T(C): 36.9 (18 Sep 2021 05:00), Max: 37 (17 Sep 2021 10:40)  T(F): 98.4 (18 Sep 2021 05:00), Max: 98.6 (17 Sep 2021 10:40)  HR: 65 (18 Sep 2021 05:00) (65 - 92)  BP: 104/69 (18 Sep 2021 05:00) (95/53 - 115/73)  BP(mean): 75 (17 Sep 2021 15:00) (64 - 81)  RR: 18 (18 Sep 2021 05:00) (13 - 27)  SpO2: 98% (18 Sep 2021 05:00) (96% - 99%)    Gen: NAD  Abd: soft, Nontender, Nondistended, fundus firm  Inc C&D  Ext: no tenderness, mild edema    Labs:                        10.3   12.21 )-----------( 179      ( 18 Sep 2021 06:50 )             30.7       A: 36y POD#1 s/p R C/S doing well.    Plan:  Routine postpartum care  Encouraged out of bed  Regular diet    SUSY Bill MD
OB Progress Note:  Delivery, POD#1    S: 35yo POD#1 s/p LTCS . Her pain is well controlled. She is tolerating a regular diet and passing flatus. Denies N/V. Denies CP/SOB/lightheadedness/dizziness. She is ambulating without difficulty. Voiding spontaneously.     O:   Vital Signs Last 24 Hrs  T(C): 36.9 (18 Sep 2021 05:00), Max: 37 (17 Sep 2021 10:40)  T(F): 98.4 (18 Sep 2021 05:00), Max: 98.6 (17 Sep 2021 10:40)  HR: 65 (18 Sep 2021 05:00) (65 - 92)  BP: 104/69 (18 Sep 2021 05:00) (95/53 - 115/73)  BP(mean): 75 (17 Sep 2021 15:00) (64 - 81)  RR: 18 (18 Sep 2021 05:00) (13 - 27)  SpO2: 98% (18 Sep 2021 05:00) (96% - 99%)    Labs:  Blood type: A Positive  Rubella IgG: RPR: Negative                          10.3<L>   12.21<H> >-----------< 179    (  @ 06:50 )             30.7<L>                  acetaminophen   Tablet .. 975 milliGRAM(s) Oral <User Schedule>  dexAMETHasone  Injectable 4 milliGRAM(s) IV Push every 6 hours PRN  diphenhydrAMINE 25 milliGRAM(s) Oral every 6 hours PRN  diphtheria/tetanus/pertussis (acellular) Vaccine (ADAcel) 0.5 milliLiter(s) IntraMuscular once  heparin   Injectable 5000 Unit(s) SubCutaneous every 12 hours  ibuprofen  Tablet. 600 milliGRAM(s) Oral every 6 hours  influenza   Vaccine 0.5 milliLiter(s) IntraMuscular once  ketorolac   Injectable 30 milliGRAM(s) IV Push every 6 hours  lactated ringers. 1000 milliLiter(s) IV Continuous <Continuous>  lanolin Ointment 1 Application(s) Topical every 6 hours PRN  magnesium hydroxide Suspension 30 milliLiter(s) Oral two times a day PRN  morphine PF Spinal 0.1 milliGRAM(s) IntraThecal. once  nalbuphine Injectable 2.5 milliGRAM(s) IV Push every 6 hours PRN  naloxone Injectable 0.1 milliGRAM(s) IV Push every 3 minutes PRN  ondansetron Injectable 4 milliGRAM(s) IV Push every 6 hours PRN  oxyCODONE    IR 5 milliGRAM(s) Oral every 3 hours PRN  oxyCODONE    IR 5 milliGRAM(s) Oral once PRN  oxytocin Infusion 333.333 milliUNIT(s)/Min IV Continuous <Continuous>  simethicone 80 milliGRAM(s) Chew every 4 hours PRN      PE:  General: NAD  CV: RRR  Pulm: CTAB  Abdomen: Mildly distended, appropriately tender, incision c/d/i. Fundus firm @umbilicus  Extremities: No calf tenderness, no pitting edema    
OB Progress Note:  delivery, POD#2    S: 37yo  now POD#2 s/p rLTCS. Pain is well controlled. She is tolerating a regular diet and passing flatus. She is voiding spontaneously, and ambulating without difficulty. Denies CP/SOB. Denies lightheadedness/dizziness. Denies N/V.    O:  Vitals:  Vital Signs Last 24 Hrs  T(C): 36.6 (19 Sep 2021 05:42), Max: 36.9 (18 Sep 2021 21:02)  T(F): 97.9 (19 Sep 2021 05:42), Max: 98.4 (18 Sep 2021 21:02)  HR: 69 (19 Sep 2021 05:42) (64 - 73)  BP: 98/64 (19 Sep 2021 05:42) (97/62 - 104/66)  BP(mean): --  RR: 18 (19 Sep 2021 05:42) (18 - 18)  SpO2: 98% (19 Sep 2021 05:42) (97% - 99%)    MEDICATIONS  (STANDING):  acetaminophen   Tablet .. 975 milliGRAM(s) Oral <User Schedule>  diphtheria/tetanus/pertussis (acellular) Vaccine (ADAcel) 0.5 milliLiter(s) IntraMuscular once  heparin   Injectable 5000 Unit(s) SubCutaneous every 12 hours  ibuprofen  Tablet. 600 milliGRAM(s) Oral every 6 hours  influenza   Vaccine 0.5 milliLiter(s) IntraMuscular once  lactated ringers. 1000 milliLiter(s) (125 mL/Hr) IV Continuous <Continuous>  oxytocin Infusion 333.333 milliUNIT(s)/Min (1000 mL/Hr) IV Continuous <Continuous>      MEDICATIONS  (PRN):  dexAMETHasone  Injectable 4 milliGRAM(s) IV Push every 6 hours PRN Nausea  diphenhydrAMINE 25 milliGRAM(s) Oral every 6 hours PRN Pruritus  lanolin Ointment 1 Application(s) Topical every 6 hours PRN Sore Nipples  magnesium hydroxide Suspension 30 milliLiter(s) Oral two times a day PRN Constipation  nalbuphine Injectable 2.5 milliGRAM(s) IV Push every 6 hours PRN Pruritus  naloxone Injectable 0.1 milliGRAM(s) IV Push every 3 minutes PRN For ANY of the following changes in patient status:  A. Breaths Per Minute LESS THAN 10, B. Oxygen saturation LESS THAN 90%, C. Sedation score of 6 for Stop After: 4 Times  ondansetron Injectable 4 milliGRAM(s) IV Push every 6 hours PRN Nausea  oxyCODONE    IR 5 milliGRAM(s) Oral once PRN Moderate to Severe Pain (4-10)  oxyCODONE    IR 5 milliGRAM(s) Oral every 3 hours PRN Moderate to Severe Pain (4-10)  simethicone 80 milliGRAM(s) Chew every 4 hours PRN Gas      Labs:  Blood type: A Positive  Rubella IgG: RPR: Negative                          10.3<L>   12.21<H> >-----------< 179    (  @ 06:50 )             30.7<L>                  PE:  General: NAD  Abdomen: Soft, appropriately tender, incision c/d/i.  Extremities: No erythema, no pitting edema      
S: Patient doing well. No complaints. Minimal lochia. Pain controlled.    O: Vital Signs Last 24 Hrs  T(C): 36.6 (19 Sep 2021 05:42), Max: 36.9 (18 Sep 2021 21:02)  T(F): 97.9 (19 Sep 2021 05:42), Max: 98.4 (18 Sep 2021 21:02)  HR: 69 (19 Sep 2021 05:42) (64 - 73)  BP: 98/64 (19 Sep 2021 05:42) (97/62 - 104/66)  BP(mean): --  RR: 18 (19 Sep 2021 05:42) (18 - 18)  SpO2: 98% (19 Sep 2021 05:42) (97% - 99%)    Gen: NAD  Abd: soft, Nontender, Nondistended, fundus firm  Inc C&D  Ext: no tenderness, mild edema    Labs:                        10.3   12.21 )-----------( 179      ( 18 Sep 2021 06:50 )             30.7       A: 36y POD#2 s/p R C/S doing well.    Plan:  Routine postpartum care  Encouraged out of bed  Regular diet    Discharge  SUSY Bill MD

## 2021-09-19 NOTE — DISCHARGE NOTE OB - PATIENT PORTAL LINK FT
You can access the FollowMyHealth Patient Portal offered by [facility] by registering at the following website: [email or web address]. By joining [facility]’s FollowMyHealth portal, you will also be able to view your health information using other applications (apps) compatible with our system.

## 2021-09-21 ENCOUNTER — NON-APPOINTMENT (OUTPATIENT)
Age: 36
End: 2021-09-21

## 2021-10-25 ENCOUNTER — APPOINTMENT (OUTPATIENT)
Dept: MRI IMAGING | Facility: CLINIC | Age: 36
End: 2021-10-25

## 2021-10-27 ENCOUNTER — APPOINTMENT (OUTPATIENT)
Dept: NEUROSURGERY | Facility: CLINIC | Age: 36
End: 2021-10-27

## 2021-12-01 ENCOUNTER — APPOINTMENT (OUTPATIENT)
Dept: FAMILY MEDICINE | Facility: CLINIC | Age: 36
End: 2021-12-01
Payer: COMMERCIAL

## 2021-12-01 PROCEDURE — 99442: CPT

## 2021-12-15 ENCOUNTER — APPOINTMENT (OUTPATIENT)
Dept: NEUROSURGERY | Facility: CLINIC | Age: 36
End: 2021-12-15
Payer: COMMERCIAL

## 2021-12-15 PROCEDURE — 99211 OFF/OP EST MAY X REQ PHY/QHP: CPT

## 2021-12-15 NOTE — DATA REVIEWED
[de-identified] : from [facility] contained on CD (obtained today) - no evidence of recurrent disease. No new lesions appreciated (to be scanned into PACS).

## 2021-12-15 NOTE — ASSESSMENT
[FreeTextEntry1] : 37 yo doing well w/o evidence of recurrence of a large pofo meningioma. Continue MR surveillance w/ next scan & appt targeted for Jan 2023, sooner for cause. All questions answered.

## 2021-12-15 NOTE — PHYSICAL EXAM
[FreeTextEntry1] : A&O x 3\par PERRL 4->3 & EOMI OU\par FS/TM\par 5/5 throughout UE & LE\par No pronator drift\par No f->n dysmetria\par No dysdiadochokinesia\par

## 2021-12-15 NOTE — REASON FOR VISIT
[Follow-Up: _____] : a [unfilled] follow-up visit [FreeTextEntry1] : 37 yo now 21 m s/p Romo 1 resection of a large petrous face CNS WHO 1 meningioma. Returns today w/ imaging for scheduled f/u. Reports no issues w/ incision and no new cerebellar sx. Now 3 months post-partum with a boy (her daughter is now 6).

## 2021-12-17 NOTE — PRE-ANESTHESIA EVALUATION ADULT - NSANTHNECKRD_ENT_A_CORE
Recommended weight and BMI control through healthy diet and exercise, green leafy veggies, UV protection, and not smoking. Reviewed the benefits of AREDS II VITAMINS VERUS GENOTYPE directed vitamin therapy, and recommended following one or the other to try and prevent the progression of the disease. Reviewed the importance of daily monitoring of the vision in each eye independently, along with the use of the Amsler grid daily and instructed patient to call and return immediately for any new changes in their vision or on the Amsler grid. Patient instructed on the importance of regular follow up and monitoring for the early detection of conversion to wet AMD as early detection results in early treatment and better outcomes. No

## 2022-01-06 ENCOUNTER — APPOINTMENT (OUTPATIENT)
Dept: FAMILY MEDICINE | Facility: CLINIC | Age: 37
End: 2022-01-06
Payer: COMMERCIAL

## 2022-01-06 VITALS
OXYGEN SATURATION: 98 % | BODY MASS INDEX: 29.64 KG/M2 | WEIGHT: 151 LBS | RESPIRATION RATE: 14 BRPM | HEIGHT: 60 IN | DIASTOLIC BLOOD PRESSURE: 74 MMHG | SYSTOLIC BLOOD PRESSURE: 102 MMHG | TEMPERATURE: 97.6 F | HEART RATE: 63 BPM

## 2022-01-06 DIAGNOSIS — Z86.011 PERSONAL HISTORY OF BENIGN NEOPLASM OF THE BRAIN: ICD-10-CM

## 2022-01-06 DIAGNOSIS — Z11.59 ENCOUNTER FOR SCREENING FOR OTHER VIRAL DISEASES: ICD-10-CM

## 2022-01-06 DIAGNOSIS — Z20.822 CONTACT WITH AND (SUSPECTED) EXPOSURE TO COVID-19: ICD-10-CM

## 2022-01-06 DIAGNOSIS — R79.89 OTHER SPECIFIED ABNORMAL FINDINGS OF BLOOD CHEMISTRY: ICD-10-CM

## 2022-01-06 DIAGNOSIS — R59.0 LOCALIZED ENLARGED LYMPH NODES: ICD-10-CM

## 2022-01-06 DIAGNOSIS — R55 SYNCOPE AND COLLAPSE: ICD-10-CM

## 2022-01-06 DIAGNOSIS — Z98.890 OTHER SPECIFIED POSTPROCEDURAL STATES: ICD-10-CM

## 2022-01-06 DIAGNOSIS — Z87.2 PERSONAL HISTORY OF DISEASES OF THE SKIN AND SUBCUTANEOUS TISSUE: ICD-10-CM

## 2022-01-06 DIAGNOSIS — Z92.29 PERSONAL HISTORY OF OTHER DRUG THERAPY: ICD-10-CM

## 2022-01-06 DIAGNOSIS — Z80.3 FAMILY HISTORY OF MALIGNANT NEOPLASM OF BREAST: ICD-10-CM

## 2022-01-06 DIAGNOSIS — Z86.59 PERSONAL HISTORY OF OTHER MENTAL AND BEHAVIORAL DISORDERS: ICD-10-CM

## 2022-01-06 DIAGNOSIS — G43.909 MIGRAINE, UNSPECIFIED, NOT INTRACTABLE, W/OUT STATUS MIGRAINOSUS: ICD-10-CM

## 2022-01-06 DIAGNOSIS — G43.009 MIGRAINE W/OUT AURA, NOT INTRACTABLE, W/OUT STATUS MIGRAINOSUS: ICD-10-CM

## 2022-01-06 DIAGNOSIS — Z87.09 PERSONAL HISTORY OF OTHER DISEASES OF THE RESPIRATORY SYSTEM: ICD-10-CM

## 2022-01-06 DIAGNOSIS — M62.519 MUSCLE WASTING AND ATROPHY, NOT ELSEWHERE CLASSIFIED, UNSPECIFIED SHOULDER: ICD-10-CM

## 2022-01-06 PROCEDURE — 99395 PREV VISIT EST AGE 18-39: CPT

## 2022-01-06 RX ORDER — ONDANSETRON HYDROCHLORIDE 4 MG/1
4 TABLET, ORALLY DISINTEGRATING ORAL
Refills: 0 | Status: DISCONTINUED | COMMUNITY
End: 2022-01-06

## 2022-01-06 RX ORDER — AZITHROMYCIN 250 MG/1
250 TABLET, FILM COATED ORAL
Qty: 1 | Refills: 0 | Status: DISCONTINUED | COMMUNITY
Start: 2021-07-13 | End: 2022-01-06

## 2022-01-06 RX ORDER — RIZATRIPTAN BENZOATE 10 MG/1
10 TABLET ORAL
Qty: 12 | Refills: 5 | Status: DISCONTINUED | COMMUNITY
Start: 2019-02-22 | End: 2022-01-06

## 2022-01-06 RX ORDER — OMEGA-3/DHA/EPA/FISH OIL 300-1000MG
CAPSULE ORAL
Refills: 0 | Status: DISCONTINUED | COMMUNITY
End: 2022-01-06

## 2022-01-06 RX ORDER — UBIDECARENONE 100 MG
CAPSULE ORAL
Refills: 0 | Status: DISCONTINUED | COMMUNITY
End: 2022-01-06

## 2022-01-10 DIAGNOSIS — R82.998 OTHER ABNORMAL FINDINGS IN URINE: ICD-10-CM

## 2022-01-10 PROBLEM — G43.009 COMMON MIGRAINE WITHOUT AURA: Status: RESOLVED | Noted: 2019-02-22 | Resolved: 2022-01-10

## 2022-01-10 PROBLEM — Z87.09 HISTORY OF PARANASAL SINUS CONGESTION: Status: RESOLVED | Noted: 2021-07-13 | Resolved: 2022-01-10

## 2022-01-10 PROBLEM — Z86.011 HISTORY OF BENIGN NEOPLASM OF BRAIN: Status: RESOLVED | Noted: 2020-10-28 | Resolved: 2022-01-10

## 2022-01-10 PROBLEM — Z98.890 S/P CRANIOTOMY: Status: RESOLVED | Noted: 2019-09-04 | Resolved: 2022-01-10

## 2022-01-10 PROBLEM — R59.0 LYMPHADENOPATHY, INGUINAL: Status: RESOLVED | Noted: 2019-04-09 | Resolved: 2022-01-10

## 2022-01-10 PROBLEM — G43.909 MIGRAINE HEADACHE: Status: RESOLVED | Noted: 2019-01-02 | Resolved: 2022-01-10

## 2022-01-10 PROBLEM — R55 SYNCOPE AND COLLAPSE: Status: RESOLVED | Noted: 2019-02-22 | Resolved: 2022-01-10

## 2022-01-10 PROBLEM — Z92.29 HISTORY OF INFLUENZA VACCINATION: Status: RESOLVED | Noted: 2020-10-05 | Resolved: 2022-01-10

## 2022-01-10 PROBLEM — M62.519 ATROPHY OF DELTOID MUSCLE: Status: RESOLVED | Noted: 2018-12-22 | Resolved: 2022-01-10

## 2022-01-10 PROBLEM — Z86.59 HISTORY OF ANXIETY: Status: RESOLVED | Noted: 2019-03-06 | Resolved: 2022-01-10

## 2022-01-10 PROBLEM — R79.89 LOW VITAMIN D LEVEL: Status: RESOLVED | Noted: 2020-12-22 | Resolved: 2022-01-10

## 2022-01-10 PROBLEM — R79.89 ELEVATED PROLACTIN LEVEL: Status: RESOLVED | Noted: 2019-07-29 | Resolved: 2022-01-10

## 2022-01-10 PROBLEM — Z87.2 HISTORY OF ECZEMA: Status: RESOLVED | Noted: 2017-08-10 | Resolved: 2022-01-10

## 2022-01-10 PROBLEM — Z87.2 HISTORY OF CONTACT DERMATITIS: Status: RESOLVED | Noted: 2018-09-17 | Resolved: 2022-01-10

## 2022-01-10 PROBLEM — Z20.822 EXPOSURE TO COVID-19 VIRUS: Status: RESOLVED | Noted: 2021-12-27 | Resolved: 2022-01-10

## 2022-01-10 NOTE — HISTORY OF PRESENT ILLNESS
[FreeTextEntry1] : CPE [de-identified] : 34 yo f pmhx of left posterior fossa meningioma s/p surgical removal for CPE\par overall is feeling well \par postpartum since 2021-  \par denies any other complaints or concerns at this time \par  \par

## 2022-01-10 NOTE — ASSESSMENT
[FreeTextEntry1] : Routine medical examination\par VSS- exam normal \par c/w current medications\par Advised healthy diet and exercise\par reviewed labs with patient- WNL\par patient verbalizes understanding and patient is stable upon discharge\par

## 2022-01-10 NOTE — HEALTH RISK ASSESSMENT
[Excellent] : ~his/her~  mood as  excellent [Never] : Never [Yes] : Yes [Monthly or less (1 pt)] : Monthly or less (1 point) [1 or 2 (0 pts)] : 1 or 2 (0 points) [Never (0 pts)] : Never (0 points) [No] : In the past 12 months have you used drugs other than those required for medical reasons? No [0] : 2) Feeling down, depressed, or hopeless: Not at all (0) [Patient reported PAP Smear was normal] : Patient reported PAP Smear was normal [HIV test declined] : HIV test declined [Hepatitis C test declined] : Hepatitis C test declined [With Family] : lives with family [Employed] : employed [] :  [Sexually Active] : sexually active [Feels Safe at Home] : Feels safe at home [Fully functional (bathing, dressing, toileting, transferring, walking, feeding)] : Fully functional (bathing, dressing, toileting, transferring, walking, feeding) [Fully functional (using the telephone, shopping, preparing meals, housekeeping, doing laundry, using] : Fully functional and needs no help or supervision to perform IADLs (using the telephone, shopping, preparing meals, housekeeping, doing laundry, using transportation, managing medications and managing finances) [FreeTextEntry1] : none [de-identified] : neurosx  [Audit-CScore] : 1 [de-identified] : walking daily  [de-identified] : balanced- supplements: sublingual b12, d  [[id number]] : 0 [Change in mental status noted] : No change in mental status noted [Language] : denies difficulty with language [High Risk Behavior] : no high risk behavior [Reports changes in hearing] : Reports no changes in hearing [Reports changes in vision] : Reports no changes in vision [Reports changes in dental health] : Reports no changes in dental health [PapSmearDate] : 2021  [de-identified] : , son, daughter  [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

## 2022-01-26 ENCOUNTER — APPOINTMENT (OUTPATIENT)
Dept: NEUROSURGERY | Facility: CLINIC | Age: 37
End: 2022-01-26

## 2022-01-27 NOTE — H&P PST ADULT - ENDOCRINE
CHIEF COMPLAINT:  Ngoc Weiss is a 41 year old female who present to clinic today for follow up of her depression medications.    HISTORY:   She is currently prescribed wellbutrin xl for depression. She notes her moods are slowly improving. She is finding herself able to get thing done around the house. She is having trouble falling and staying asleep. This week she is taking melatonin 10 mg which helps but she wakes of feeling groggy. She continues to attend therapy.     Recent Review Flowsheet Data     Date 1/28/2022    Adult PHQ 2 Score 3    Adult PHQ 2 Interpretation Further screening needed    Little interest or pleasure in activity? 1    Feeling down, depressed or hopeless? 2    Adult PHQ 9 Score 12    Adult PHQ 9 Interpretation Moderate Depression    Trouble falling or staying asleep or sleeping all the time? 3    Feeling tired or having little energy? 2    Poor appetite or overeating? 1    Feeling bad about yourself or that you are a failure or have let yourself or family down? 1    Trouble concentrating on things such as reading the newspaper or watching TV? 1    Moving or speaking slowly that other people have noticed or the opposite - being so fidgety or restless that you have been moving around a lot more than usual? 1    Thoughts that you would be better off dead or of hurting yourself in some way? 0            The following portions of the patient's history were reviewed including the  nurses notes and the following were updated as appropriate: allergies, current medications, past family history, past medical history, past social history and past surgical history.    ALLERGIES:  Seasonal    MEDICATIONS:    Current Outpatient Medications   Medication Sig Dispense Refill   • buPROPion XL (WELLBUTRIN XL) 150 MG 24 hr tablet Take 1 tablet by mouth daily. 90 tablet 1   • cetirizine (ZYRTEC) 10 MG tablet Take 10 mg by mouth daily.     • omeprazole (PRILOSEC) 20 MG capsule Take 20 mg by mouth  daily.     • Omega-3 Fatty Acids (FISH OIL PO)      • CALCIUM CITRATE-VITAMIN D PO      • traZODone (DESYREL) 50 MG tablet Take 1 tablet by mouth nightly as needed for Sleep. 30 tablet 1     No current facility-administered medications for this visit.       PHYSICAL EXAM:   Visit Vitals  /72 (BP Location: LUE - Left upper extremity, Patient Position: Sitting, Cuff Size: Regular)   Pulse 71   Temp 97.4 °F (36.3 °C) (Temporal)   Resp 14   Ht 5' 4\" (1.626 m)   Wt 87.1 kg (192 lb)   SpO2 98%   BMI 32.96 kg/m²     General: Well-developed and well nourished.   Psychiatric: Normal affect and mood.    ASSESSMENT/PLAN   1. Major depressive disorder with single episode, in partial remission (CMS/HCC)    2. Insomnia, unspecified type        Orders Placed This Encounter   • traZODone (DESYREL) 50 MG tablet     Continue bupropion xl. Add trazodone 50 mg nightly PRN. Side effects reviewed. Follow up in 1 month or call if medication is not helpful/causes side effects.     Supervising MD: Dr. Cuadra     negative

## 2022-04-11 PROBLEM — Z11.59 SCREENING FOR VIRAL DISEASE: Status: RESOLVED | Noted: 2020-07-01 | Resolved: 2022-01-10

## 2022-05-03 NOTE — OB RN DELIVERY SUMMARY - AMNIOTIC FLUID AMOUNT, LABOR
Tc with pt, who stated he took Finasteride once on Friday. Stated he experienced pain around his heart and felt off for about 24 hours. Pt stated he returned Finasteride to the pharmacy. Pt stated the pain around his heart has resolved. Advised the pain around his heart may not be related to Finasteride and advised if this symptom arises again, to seek medical attention. Pt expressed understanding.       within normal limits

## 2022-07-06 ENCOUNTER — APPOINTMENT (OUTPATIENT)
Dept: FAMILY MEDICINE | Facility: CLINIC | Age: 37
End: 2022-07-06

## 2022-07-06 PROCEDURE — 99213 OFFICE O/P EST LOW 20 MIN: CPT | Mod: 95

## 2022-07-06 RX ORDER — NITROFURANTOIN (MONOHYDRATE/MACROCRYSTALS) 25; 75 MG/1; MG/1
100 CAPSULE ORAL TWICE DAILY
Qty: 14 | Refills: 0 | Status: COMPLETED | COMMUNITY
Start: 2022-03-02 | End: 2022-07-06

## 2022-07-07 RX ORDER — MOXIFLOXACIN OPHTHALMIC 5 MG/ML
0.5 SOLUTION/ DROPS OPHTHALMIC
Qty: 3 | Refills: 0 | Status: COMPLETED | COMMUNITY
Start: 2022-02-18

## 2022-07-10 NOTE — ASSESSMENT
[FreeTextEntry1] : reviewed treatment options with patient including MAB, paxlovid, and conservative treatment \par currently is breastfeeding- discussed adverse effects, defers at this time\par offered referral for MAB- cannot leave daughter alone to get treatment at this time\par advised to increase fluid intake, rest, and acetaminophen/ibuprofen prn pain/fever\par advised to c/w isolation for 5 days from positive test if sx resolving, c/w mask post isolation period \par c/w advair BID and albuterol prn q 4-6 h prn \par advised if sx worsens- including but not limited to sob- to go to ED \par advised to follow up if sx persists \par patient verbalizes understanding and is stable upon d/c\par

## 2022-07-10 NOTE — HISTORY OF PRESENT ILLNESS
[Home] : at home, [unfilled] , at the time of the visit. [Medical Office: ([facility])___] : at the medical office located in  [Verbal consent obtained from patient] : the patient, [unfilled] [FreeTextEntry8] : + positive for covid 19 \par c/o cough and congestion\par symptoms have been ongoing since sunday \par symptoms include: fatigue, fever- tmax of 102, body aches, nasal congestion \par mild tightness \par denies any chest tightness or sob \par Sick contacts- daughter \par recent travel- none\par self treatment- none\par tested prior- sunday \par vaccine status- utd on vaccines \par denies any other associated symptoms \par denies any other complaints or concerns at this time\par

## 2022-07-10 NOTE — PHYSICAL EXAM
[Normal] : no acute distress, well nourished, well developed and well-appearing [de-identified] : speaking in complete sentences

## 2022-08-25 ENCOUNTER — NON-APPOINTMENT (OUTPATIENT)
Age: 37
End: 2022-08-25

## 2022-11-25 ENCOUNTER — APPOINTMENT (OUTPATIENT)
Dept: MAMMOGRAPHY | Facility: CLINIC | Age: 37
End: 2022-11-25

## 2022-11-25 ENCOUNTER — APPOINTMENT (OUTPATIENT)
Dept: ULTRASOUND IMAGING | Facility: CLINIC | Age: 37
End: 2022-11-25

## 2022-11-25 PROCEDURE — 77063 BREAST TOMOSYNTHESIS BI: CPT

## 2022-11-25 PROCEDURE — 77067 SCR MAMMO BI INCL CAD: CPT

## 2022-11-25 PROCEDURE — 76641 ULTRASOUND BREAST COMPLETE: CPT | Mod: 50

## 2022-12-09 ENCOUNTER — APPOINTMENT (OUTPATIENT)
Dept: FAMILY MEDICINE | Facility: CLINIC | Age: 37
End: 2022-12-09

## 2022-12-09 VITALS
OXYGEN SATURATION: 99 % | TEMPERATURE: 97.9 F | SYSTOLIC BLOOD PRESSURE: 106 MMHG | HEART RATE: 86 BPM | DIASTOLIC BLOOD PRESSURE: 66 MMHG | RESPIRATION RATE: 14 BRPM

## 2022-12-09 PROCEDURE — 99214 OFFICE O/P EST MOD 30 MIN: CPT

## 2022-12-09 RX ORDER — MUPIROCIN 20 MG/G
2 OINTMENT TOPICAL
Qty: 22 | Refills: 0 | Status: COMPLETED | COMMUNITY
Start: 2022-08-29

## 2022-12-09 RX ORDER — CLOTRIMAZOLE AND BETAMETHASONE DIPROPIONATE 10; .5 MG/G; MG/G
1-0.05 CREAM TOPICAL
Qty: 15 | Refills: 0 | Status: COMPLETED | COMMUNITY
Start: 2022-08-26

## 2022-12-09 RX ORDER — CICLOPIROX 7.7 MG/G
0.77 GEL TOPICAL
Qty: 45 | Refills: 0 | Status: COMPLETED | COMMUNITY
Start: 2022-08-29

## 2022-12-12 RX ORDER — BALOXAVIR MARBOXIL 80 MG/1
1 X 80 TABLET, FILM COATED ORAL ONCE
Qty: 1 | Refills: 0 | Status: COMPLETED | COMMUNITY
Start: 2022-12-12 | End: 2022-12-12

## 2022-12-16 ENCOUNTER — APPOINTMENT (OUTPATIENT)
Dept: ULTRASOUND IMAGING | Facility: CLINIC | Age: 37
End: 2022-12-16

## 2022-12-16 ENCOUNTER — OUTPATIENT (OUTPATIENT)
Dept: OUTPATIENT SERVICES | Facility: HOSPITAL | Age: 37
LOS: 1 days | End: 2022-12-16
Payer: COMMERCIAL

## 2022-12-16 DIAGNOSIS — Z98.890 OTHER SPECIFIED POSTPROCEDURAL STATES: Chronic | ICD-10-CM

## 2022-12-16 DIAGNOSIS — K40.90 UNILATERAL INGUINAL HERNIA, WITHOUT OBSTRUCTION OR GANGRENE, NOT SPECIFIED AS RECURRENT: ICD-10-CM

## 2022-12-16 DIAGNOSIS — K08.409 PARTIAL LOSS OF TEETH, UNSPECIFIED CAUSE, UNSPECIFIED CLASS: Chronic | ICD-10-CM

## 2022-12-16 DIAGNOSIS — R10.31 RIGHT LOWER QUADRANT PAIN: ICD-10-CM

## 2022-12-16 DIAGNOSIS — M79.661 PAIN IN RIGHT LOWER LEG: ICD-10-CM

## 2022-12-16 DIAGNOSIS — Z98.89 OTHER SPECIFIED POSTPROCEDURAL STATES: Chronic | ICD-10-CM

## 2022-12-16 PROCEDURE — 76705 ECHO EXAM OF ABDOMEN: CPT | Mod: 26

## 2022-12-16 PROCEDURE — 93971 EXTREMITY STUDY: CPT | Mod: 26,RT

## 2022-12-16 PROCEDURE — 93971 EXTREMITY STUDY: CPT

## 2022-12-16 PROCEDURE — 76705 ECHO EXAM OF ABDOMEN: CPT

## 2022-12-19 NOTE — HISTORY OF PRESENT ILLNESS
[FreeTextEntry8] : 1) strange sensation in right calf x 3-4 months\par feels mildly swollen \par sister with history of blood clot, 45 yo, provoked by flight \par chronic hip pain on the right as well \par starting on right lateral side of back and hip \par has not been seen by provider prior \par 2 c-sections, most recently 15 months ago \par denies any numbness or tingling \par denies any other associated symptoms \par \par 2) hx of inguinal hernia surgery at 2019 \par seen by surgeon at the time \par in the past 2-3 months, a pop burn sensation \par possibly no mesh in the area \par laying can feel a mild throbbing sensation \par denies any other associated symptoms \par denies any other complaints or concerns at this time

## 2022-12-19 NOTE — ASSESSMENT
[FreeTextEntry1] : VSS, exam normal\par will follow up imaging \par advised to increase fluid intake, rest, and acetaminophen/ibuprofen prn pain/fever\par advised if sx worsens or persists- including but not limited to vomiting, fever or worsening abdominal pain- to go to ED \par follow up in office if sx persists or worsens\par patient verbalizes understanding and is stable upon d/c\par

## 2023-01-10 ENCOUNTER — APPOINTMENT (OUTPATIENT)
Dept: FAMILY MEDICINE | Facility: CLINIC | Age: 38
End: 2023-01-10
Payer: COMMERCIAL

## 2023-01-10 VITALS
TEMPERATURE: 97.3 F | DIASTOLIC BLOOD PRESSURE: 62 MMHG | OXYGEN SATURATION: 99 % | SYSTOLIC BLOOD PRESSURE: 104 MMHG | BODY MASS INDEX: 27.96 KG/M2 | HEART RATE: 78 BPM | WEIGHT: 142.4 LBS | RESPIRATION RATE: 14 BRPM | HEIGHT: 60 IN

## 2023-01-10 DIAGNOSIS — E53.8 DEFICIENCY OF OTHER SPECIFIED B GROUP VITAMINS: ICD-10-CM

## 2023-01-10 DIAGNOSIS — Z87.898 PERSONAL HISTORY OF OTHER SPECIFIED CONDITIONS: ICD-10-CM

## 2023-01-10 DIAGNOSIS — R10.31 RIGHT LOWER QUADRANT PAIN: ICD-10-CM

## 2023-01-10 DIAGNOSIS — M79.661 PAIN IN RIGHT LOWER LEG: ICD-10-CM

## 2023-01-10 DIAGNOSIS — Z20.828 CONTACT WITH AND (SUSPECTED) EXPOSURE TO OTHER VIRAL COMMUNICABLE DISEASES: ICD-10-CM

## 2023-01-10 PROCEDURE — 99395 PREV VISIT EST AGE 18-39: CPT

## 2023-01-10 RX ORDER — BALOXAVIR MARBOXIL 40 MG/1
1 X 40 TABLET, FILM COATED ORAL ONCE
Qty: 1 | Refills: 0 | Status: COMPLETED | COMMUNITY
Start: 2022-12-12 | End: 2023-01-10

## 2023-01-10 RX ORDER — ONDANSETRON 4 MG/1
4 TABLET ORAL EVERY 8 HOURS
Qty: 28 | Refills: 0 | Status: COMPLETED | COMMUNITY
Start: 2022-12-12 | End: 2023-01-10

## 2023-01-10 RX ORDER — FLUTICASONE PROPIONATE AND SALMETEROL 50; 250 UG/1; UG/1
250-50 POWDER RESPIRATORY (INHALATION)
Qty: 1 | Refills: 0 | Status: COMPLETED | COMMUNITY
Start: 2020-03-11 | End: 2023-01-10

## 2023-01-10 RX ORDER — ALBUTEROL SULFATE 90 UG/1
108 (90 BASE) INHALANT RESPIRATORY (INHALATION)
Qty: 4 | Refills: 3 | Status: COMPLETED | COMMUNITY
Start: 2018-05-25 | End: 2023-01-10

## 2023-01-10 NOTE — HISTORY OF PRESENT ILLNESS
[FreeTextEntry1] : CPE [de-identified] : overall is feeling well\par chronic right inguinal pain \par hx of inguinal hernia surgery at 2019 \par seen by surgeon at the time \par in the past 2-3 months, a pop burn sensation \par possibly no mesh in the area \par laying can feel a mild throbbing sensation\par was told by tech in 2020- ob \par gyn = [facility] Dr. Bill \par LAD on right sided \par denies any other associated symptoms \par denies any other complaints or concerns at this time \par  \par

## 2023-01-10 NOTE — ASSESSMENT
[FreeTextEntry1] : Routine medical examination\par VSS- exam normal \par c/w current medications\par Advised healthy diet and exercise\par will follow up labs and imaging \par patient verbalizes understanding and patient is stable upon discharge\par

## 2023-01-10 NOTE — PHYSICAL EXAM
[No Acute Distress] : no acute distress [Well Nourished] : well nourished [Well Developed] : well developed [Well-Appearing] : well-appearing [Normal Sclera/Conjunctiva] : normal sclera/conjunctiva [PERRL] : pupils equal round and reactive to light [EOMI] : extraocular movements intact [Normal Outer Ear/Nose] : the outer ears and nose were normal in appearance [Normal Oropharynx] : the oropharynx was normal [No JVD] : no jugular venous distention [No Lymphadenopathy] : no lymphadenopathy [Supple] : supple [Thyroid Normal, No Nodules] : the thyroid was normal and there were no nodules present [No Respiratory Distress] : no respiratory distress  [No Accessory Muscle Use] : no accessory muscle use [Normal Rate] : normal rate  [Clear to Auscultation] : lungs were clear to auscultation bilaterally [Regular Rhythm] : with a regular rhythm [Normal S1, S2] : normal S1 and S2 [No Murmur] : no murmur heard [No Carotid Bruits] : no carotid bruits [No Abdominal Bruit] : a ~M bruit was not heard ~T in the abdomen [No Varicosities] : no varicosities [Pedal Pulses Present] : the pedal pulses are present [No Edema] : there was no peripheral edema [No Palpable Aorta] : no palpable aorta [No Extremity Clubbing/Cyanosis] : no extremity clubbing/cyanosis [Soft] : abdomen soft [Non Tender] : non-tender [Non-distended] : non-distended [No Masses] : no abdominal mass palpated [No HSM] : no HSM [Normal Bowel Sounds] : normal bowel sounds [Normal Posterior Cervical Nodes] : no posterior cervical lymphadenopathy [Normal Anterior Cervical Nodes] : no anterior cervical lymphadenopathy [No CVA Tenderness] : no CVA  tenderness [No Spinal Tenderness] : no spinal tenderness [No Joint Swelling] : no joint swelling [Grossly Normal Strength/Tone] : grossly normal strength/tone [No Rash] : no rash [Coordination Grossly Intact] : coordination grossly intact [No Focal Deficits] : no focal deficits [Normal Gait] : normal gait [Deep Tendon Reflexes (DTR)] : deep tendon reflexes were 2+ and symmetric [Normal Affect] : the affect was normal [Normal Insight/Judgement] : insight and judgment were intact [Normal Inguinal Nodes] : no inguinal lymphadenopathy [de-identified] : no rebound or guarding, negative hanks's sign, negative mcburney's point

## 2023-01-10 NOTE — HEALTH RISK ASSESSMENT
[Never] : Never [1 or 2 (0 pts)] : 1 or 2 (0 points) [Never (0 pts)] : Never (0 points) [No] : In the past 12 months have you used drugs other than those required for medical reasons? No [0] : 2) Feeling down, depressed, or hopeless: Not at all (0) [PHQ-2 Negative - No further assessment needed] : PHQ-2 Negative - No further assessment needed [Patient reported PAP Smear was normal] : Patient reported PAP Smear was normal [HIV test declined] : HIV test declined [Hepatitis C test declined] : Hepatitis C test declined [None] : None [With Family] : lives with family [Employed] : employed [] :  [Feels Safe at Home] : Feels safe at home [Fully functional (bathing, dressing, toileting, transferring, walking, feeding)] : Fully functional (bathing, dressing, toileting, transferring, walking, feeding) [Fully functional (using the telephone, shopping, preparing meals, housekeeping, doing laundry, using] : Fully functional and needs no help or supervision to perform IADLs (using the telephone, shopping, preparing meals, housekeeping, doing laundry, using transportation, managing medications and managing finances) [Very Good] : ~his/her~  mood as very good [FreeTextEntry1] : ^ see above  [de-identified] : none [de-identified] : neuro sx- mri thurs, gyn, neuro  [Audit-CScore] : 0 [de-identified] : power walking [de-identified] : balanced; supplements- b and d occ  [[id number]] : 0 [Change in mental status noted] : No change in mental status noted [Language] : denies difficulty with language [Reports changes in hearing] : Reports no changes in hearing [Reports changes in vision] : Reports no changes in vision [Reports changes in dental health] : Reports no changes in dental health [PapSmearDate] : 2021  [de-identified] : , son, daughter [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

## 2023-01-18 ENCOUNTER — APPOINTMENT (OUTPATIENT)
Dept: NEUROSURGERY | Facility: CLINIC | Age: 38
End: 2023-01-18
Payer: COMMERCIAL

## 2023-01-18 ENCOUNTER — NON-APPOINTMENT (OUTPATIENT)
Age: 38
End: 2023-01-18

## 2023-01-18 PROCEDURE — 99213 OFFICE O/P EST LOW 20 MIN: CPT

## 2023-01-18 NOTE — REASON FOR VISIT
[Follow-Up: _____] : a [unfilled] follow-up visit [FreeTextEntry1] : Mrs. Barker is a 37 year female 4 year s/p Romo 1 resection of a large petrous face CNS WHO 1 meningioma. Returns today w/ imaging for scheduled f/u.\par \par Today she reports some fogginess/ dizziness that is exacerbated  when fatigue but overall feels well  She denies visual impairment and intense HA, she is working full time, driving, commuting to work via train, and caring for her two children without difficulty . She also reports "bony" protrusion from incision site, denies pain, no open areas,no drainage, or warmth. \par \par \par

## 2023-01-18 NOTE — ASSESSMENT
[FreeTextEntry1] : Discussion:\par No change in MRI - increasing unlikely that the meningoma will be a problem\par MRI brain w/o contrast in one year, followed OV\par Will continue to monitor bony bulge - no interventions required \par Contact office with any questions or concerns in the interim\par \par .IThierry evaluated the patient with the nurse practitioner Joann Barthelemy and established the plan of care. I personally discuss this patient with the nurse practitioner at the time of the visit. I agree with the assessment and plan as written, unless noted below.

## 2023-01-18 NOTE — PHYSICAL EXAM
[General Appearance - Alert] : alert [General Appearance - In No Acute Distress] : in no acute distress [General Appearance - Well Nourished] : well nourished [General Appearance - Well Developed] : well developed [General Appearance - Well-Appearing] : healthy appearing [Oriented To Time, Place, And Person] : oriented to person, place, and time [Impaired Insight] : insight and judgment were intact [Mood] : the mood was normal [Person] : oriented to person [Place] : oriented to place [Time] : oriented to time [Short Term Intact] : short term memory intact [Remote Intact] : remote memory intact [Registration Intact] : recent registration memory intact [Span Intact] : the attention span was normal [Concentration Intact] : normal concentrating ability [Visual Intact] : visual attention was ~T not ~L decreased [Fluency] : fluency intact [Comprehension] : comprehension intact [Reading] : reading intact [Motor Strength] : muscle strength was normal in all four extremities [Romberg's Sign] : a positive Romberg's sign was present [Sensation Tactile Decrease] : light touch was intact [Balance] : balance was intact [Sclera] : the sclera and conjunctiva were normal [PERRL With Normal Accommodation] : pupils were equal in size, round, reactive to light, with normal accommodation [Extraocular Movements] : extraocular movements were intact [Outer Ear] : the ears and nose were normal in appearance [Neck Appearance] : the appearance of the neck was normal [] : no respiratory distress [Abnormal Walk] : normal gait [Motor Tone] : muscle strength and tone were normal [Involuntary Movements] : no involuntary movements were seen [Skin Color & Pigmentation] : normal skin color and pigmentation

## 2023-01-31 ENCOUNTER — NON-APPOINTMENT (OUTPATIENT)
Age: 38
End: 2023-01-31

## 2023-02-07 LAB
25(OH)D3 SERPL-MCNC: 27.7 NG/ML
ALBUMIN SERPL ELPH-MCNC: 4.4 G/DL
ALP BLD-CCNC: 70 U/L
ALT SERPL-CCNC: 8 U/L
ANION GAP SERPL CALC-SCNC: 10 MMOL/L
AST SERPL-CCNC: 14 U/L
BASOPHILS # BLD AUTO: 0.04 K/UL
BASOPHILS NFR BLD AUTO: 0.6 %
BILIRUB DIRECT SERPL-MCNC: 0.2 MG/DL
BILIRUB INDIRECT SERPL-MCNC: 0.9 MG/DL
BILIRUB SERPL-MCNC: 1.1 MG/DL
BUN SERPL-MCNC: 9 MG/DL
CALCIUM SERPL-MCNC: 9.4 MG/DL
CHLORIDE SERPL-SCNC: 102 MMOL/L
CHOLEST SERPL-MCNC: 187 MG/DL
CO2 SERPL-SCNC: 26 MMOL/L
CREAT SERPL-MCNC: 0.59 MG/DL
EGFR: 118 ML/MIN/1.73M2
EOSINOPHIL # BLD AUTO: 0.04 K/UL
EOSINOPHIL NFR BLD AUTO: 0.6 %
ESTIMATED AVERAGE GLUCOSE: 100 MG/DL
FOLATE SERPL-MCNC: 15.6 NG/ML
GLUCOSE SERPL-MCNC: 97 MG/DL
HBA1C MFR BLD HPLC: 5.1 %
HCT VFR BLD CALC: 41.4 %
HDLC SERPL-MCNC: 70 MG/DL
HGB BLD-MCNC: 13.4 G/DL
IMM GRANULOCYTES NFR BLD AUTO: 0.3 %
IRON SERPL-MCNC: 172 UG/DL
LDLC SERPL CALC-MCNC: 106 MG/DL
LYMPHOCYTES # BLD AUTO: 1.5 K/UL
LYMPHOCYTES NFR BLD AUTO: 22.6 %
MAN DIFF?: NORMAL
MCHC RBC-ENTMCNC: 32 PG
MCHC RBC-ENTMCNC: 32.4 GM/DL
MCV RBC AUTO: 98.8 FL
MONOCYTES # BLD AUTO: 0.36 K/UL
MONOCYTES NFR BLD AUTO: 5.4 %
NEUTROPHILS # BLD AUTO: 4.69 K/UL
NEUTROPHILS NFR BLD AUTO: 70.5 %
NONHDLC SERPL-MCNC: 117 MG/DL
PLATELET # BLD AUTO: 264 K/UL
POTASSIUM SERPL-SCNC: 4.5 MMOL/L
PROLACTIN SERPL-MCNC: 13.6 NG/ML
PROT SERPL-MCNC: 7 G/DL
RBC # BLD: 4.19 M/UL
RBC # FLD: 12 %
SODIUM SERPL-SCNC: 138 MMOL/L
T4 FREE SERPL-MCNC: 1 NG/DL
TRIGL SERPL-MCNC: 57 MG/DL
TSH SERPL-ACNC: 1.32 UIU/ML
VIT B12 SERPL-MCNC: 810 PG/ML
WBC # FLD AUTO: 6.65 K/UL

## 2023-02-17 ENCOUNTER — APPOINTMENT (OUTPATIENT)
Dept: FAMILY MEDICINE | Facility: CLINIC | Age: 38
End: 2023-02-17
Payer: COMMERCIAL

## 2023-02-17 DIAGNOSIS — E55.9 VITAMIN D DEFICIENCY, UNSPECIFIED: ICD-10-CM

## 2023-02-17 PROCEDURE — 36415 COLL VENOUS BLD VENIPUNCTURE: CPT

## 2023-02-22 ENCOUNTER — NON-APPOINTMENT (OUTPATIENT)
Age: 38
End: 2023-02-22

## 2023-02-23 LAB
25(OH)D3 SERPL-MCNC: 33.4 NG/ML
BASOPHILS # BLD AUTO: 0.04 K/UL
BASOPHILS NFR BLD AUTO: 0.7 %
EOSINOPHIL # BLD AUTO: 0.07 K/UL
EOSINOPHIL NFR BLD AUTO: 1.2 %
FERRITIN SERPL-MCNC: 34 NG/ML
HCT VFR BLD CALC: 41.7 %
HGB BLD-MCNC: 13.8 G/DL
IMM GRANULOCYTES NFR BLD AUTO: 0.2 %
IRON SATN MFR SERPL: 43 %
IRON SERPL-MCNC: 136 UG/DL
LYMPHOCYTES # BLD AUTO: 1.76 K/UL
LYMPHOCYTES NFR BLD AUTO: 31.1 %
MAN DIFF?: NORMAL
MCHC RBC-ENTMCNC: 32.4 PG
MCHC RBC-ENTMCNC: 33.1 GM/DL
MCV RBC AUTO: 97.9 FL
MONOCYTES # BLD AUTO: 0.43 K/UL
MONOCYTES NFR BLD AUTO: 7.6 %
NEUTROPHILS # BLD AUTO: 3.35 K/UL
NEUTROPHILS NFR BLD AUTO: 59.2 %
PLATELET # BLD AUTO: 230 K/UL
RBC # BLD: 4.26 M/UL
RBC # FLD: 12 %
TIBC SERPL-MCNC: 318 UG/DL
UIBC SERPL-MCNC: 182 UG/DL
WBC # FLD AUTO: 5.66 K/UL

## 2023-03-01 NOTE — ASU PREOP CHECKLIST - TYPE OF SOLUTION
-- DO NOT REPLY / DO NOT REPLY ALL --  -- Message is from [facility] [employer] ([facility]) --    ONLY TO BE USED WITHIN A REFILL MEDICATION ENCOUNTER    Med Refill  Is the patient currently having any symptoms?: No/Non-Emergent symptoms    Name of medication requested: See pended med    Has patient contacted the pharmacy? Yes    Is this the first request for the medication in the last 48 hours?: Yes      Patient is requesting a medication refill - medication is on active list      Full name of the provider who ordered the medication:      [facility] site name / Account # for provider:     Preferred Pharmacy: Pharmacy  [id number] - [address]    Patient confirmed the above pharmacy as correct?  Yes      Caller Information       Type Contact Phone/Fax    03/01/2023 10:43 AM CST Phone (Incoming) Melanie Corbett (Self) [phone] (M)          Alternative phone number: [phone]    Can a detailed message be left?: Yes    Patient is completely out of medication: Verify if patient is currently experiencing symptoms. If patient is symptomatic, proceed with front end triage instead of medication refill. If patient is not symptomatic but is completely out of medication, jennifer as High priority when routing. Inform patient: “Please call back with any questions or concerns and if your condition becomes life threatening, you should seek immediate medical assistance by calling 911 or going to the Emergency Department for evaluation.”    Inform all patients: \"If the clinical team needs to contact you regarding this refill, please be aware the return phone call may come from an unidentified or out of state phone number and your refill request will be addressed as soon as the clinical team reviews your message.\"   heplock

## 2023-03-24 ENCOUNTER — APPOINTMENT (OUTPATIENT)
Dept: FAMILY MEDICINE | Facility: CLINIC | Age: 38
End: 2023-03-24
Payer: COMMERCIAL

## 2023-03-24 VITALS
TEMPERATURE: 97.7 F | SYSTOLIC BLOOD PRESSURE: 112 MMHG | OXYGEN SATURATION: 98 % | DIASTOLIC BLOOD PRESSURE: 60 MMHG | HEART RATE: 88 BPM | RESPIRATION RATE: 14 BRPM

## 2023-03-24 DIAGNOSIS — J20.9 ACUTE BRONCHITIS, UNSPECIFIED: ICD-10-CM

## 2023-03-24 DIAGNOSIS — Z11.59 ENCOUNTER FOR SCREENING FOR OTHER VIRAL DISEASES: ICD-10-CM

## 2023-03-24 PROCEDURE — 99214 OFFICE O/P EST MOD 30 MIN: CPT

## 2023-03-25 ENCOUNTER — TRANSCRIPTION ENCOUNTER (OUTPATIENT)
Age: 38
End: 2023-03-25

## 2023-03-26 LAB — SARS-COV-2 N GENE NPH QL NAA+PROBE: NOT DETECTED

## 2023-03-28 PROBLEM — Z11.59 SCREENING FOR VIRAL DISEASE: Status: ACTIVE | Noted: 2023-03-24

## 2023-03-28 PROBLEM — J20.9 ACUTE BRONCHITIS: Status: RESOLVED | Noted: 2023-03-24 | Resolved: 2023-04-23

## 2023-03-28 NOTE — PHYSICAL EXAM
[Normal Outer Ear/Nose] : the outer ears and nose were normal in appearance [Normal TMs] : both tympanic membranes were normal [No Lymphadenopathy] : no lymphadenopathy [Normal Rate] : normal rate  [Regular Rhythm] : with a regular rhythm [Normal S1, S2] : normal S1 and S2 [Coordination Grossly Intact] : coordination grossly intact [No Focal Deficits] : no focal deficits [Normal Gait] : normal gait [Speech Grossly Normal] : speech grossly normal [Alert and Oriented x3] : oriented to person, place, and time [Normal Mood] : the mood was normal [Normal] : affect was normal and insight and judgment were intact [de-identified] : PND

## 2023-03-28 NOTE — REVIEW OF SYSTEMS
[Fever] : no fever [Chills] : no chills [Fatigue] : fatigue [Hot Flashes] : no hot flashes [Night Sweats] : no night sweats [Recent Change In Weight] : ~T no recent weight change [Earache] : no earache [Hearing Loss] : no hearing loss [Hoarseness] : no hoarseness [Nosebleed] : no nosebleeds [Nasal Discharge] : nasal discharge [Sore Throat] : no sore throat [Postnasal Drip] : postnasal drip [Shortness Of Breath] : no shortness of breath [Wheezing] : no wheezing [Cough] : cough [Dyspnea on Exertion] : no dyspnea on exertion [Negative] : Psychiatric

## 2023-03-28 NOTE — END OF VISIT
[FreeTextEntry3] : I, Dr. Pina, personally performed this evaluation and management (E/M) services for this new patient. That E/M includes conducting the initial examination, assessing all conditions, and establishing the plan of care. Today, my nurse practitioner, Rose Rosado, was here to observe my evaluation and management services for this patient to be followed going forward.\par

## 2023-03-28 NOTE — HISTORY OF PRESENT ILLNESS
[FreeTextEntry8] : 38 yo female presents to the office for worsening cough, congestion, fatigue. denies any nausea, vomiting, fevers. states cough/congestion has been going around the house. states symptoms have been worsening over the past week.

## 2023-04-10 ENCOUNTER — APPOINTMENT (OUTPATIENT)
Dept: FAMILY MEDICINE | Facility: CLINIC | Age: 38
End: 2023-04-10
Payer: COMMERCIAL

## 2023-04-10 VITALS
HEART RATE: 85 BPM | TEMPERATURE: 97.7 F | OXYGEN SATURATION: 99 % | RESPIRATION RATE: 14 BRPM | DIASTOLIC BLOOD PRESSURE: 80 MMHG | SYSTOLIC BLOOD PRESSURE: 120 MMHG

## 2023-04-10 DIAGNOSIS — J01.90 ACUTE SINUSITIS, UNSPECIFIED: ICD-10-CM

## 2023-04-10 PROCEDURE — 99214 OFFICE O/P EST MOD 30 MIN: CPT

## 2023-04-10 RX ORDER — AZITHROMYCIN 250 MG/1
250 TABLET, FILM COATED ORAL
Qty: 1 | Refills: 0 | Status: COMPLETED | COMMUNITY
Start: 2023-03-24 | End: 2023-04-10

## 2023-04-10 RX ORDER — METHYLPREDNISOLONE 4 MG/1
4 TABLET ORAL
Qty: 1 | Refills: 0 | Status: COMPLETED | COMMUNITY
Start: 2023-03-24 | End: 2023-04-10

## 2023-04-18 ENCOUNTER — APPOINTMENT (OUTPATIENT)
Dept: FAMILY MEDICINE | Facility: CLINIC | Age: 38
End: 2023-04-18
Payer: COMMERCIAL

## 2023-04-18 VITALS
RESPIRATION RATE: 14 BRPM | HEART RATE: 84 BPM | DIASTOLIC BLOOD PRESSURE: 70 MMHG | TEMPERATURE: 96.6 F | OXYGEN SATURATION: 99 % | SYSTOLIC BLOOD PRESSURE: 104 MMHG

## 2023-04-18 PROCEDURE — 99214 OFFICE O/P EST MOD 30 MIN: CPT

## 2023-04-23 NOTE — PHYSICAL EXAM
[Normal] : no posterior cervical lymphadenopathy and no anterior cervical lymphadenopathy [de-identified] : mild end expiratory wheezing

## 2023-04-23 NOTE — HISTORY OF PRESENT ILLNESS
Caller:  YUE Stafford  Consult for : Deirdre Tran  Referred by: Dr Rubin Shook : JOANA  Room number:  Gwyndolyn Lola QZEU:[id number]  Diagnosis:  Left shoulder acute pain,RTC tear,possible injection  RN for the patient:    Sarah Dailey RN phone #: [phone]    What was the method of communication with the provider or office?:page and staff message   Consult received per Dr Deirdre Tran and Suyapa Perry
[FreeTextEntry8] : 36 yo f, pmhx of meningioma and mild intermittent asthma, c/o cough x 3 weeks\par returned about a week \par history of cold induced asthma \par has been on advair in the past\par feeling overall okay \par but in the evening- feeling tight and wheezing occasionally which is audible \par denies any night time coughing\par denies any fatigue \par denies any sob or chest tightness \par given azithromycin and medrol, then doxycycline\par used albuterol 3 days ago \par covid pcr negative at the time \par denies any seasonal allergies symptoms \par denies any other associated symptoms\par denies any other complaints or concerns at this time

## 2023-04-23 NOTE — ASSESSMENT
[FreeTextEntry1] : VSS \par medication as prescribed, medication education done \par consider cxr and pulm eval \par follow up in office if sx persists or worsens\par patient verbalizes understanding and is stable upon d/c\par

## 2023-04-24 NOTE — ASU PATIENT PROFILE, ADULT - BILL OF RIGHTS/ADMISSION INFORMATION PROVIDED TO:
MA Communication: The following orders are received by verbal communication from Mariaelena De La Cruz, [address]     Orders include:   [id number] [facility] will call to schedule.     Dr. Taryn Zuniga will call you with the results Resp 16   Ht 5' 6\" (1.676 m)   Wt 170 lb (77.1 kg)   SpO2 96%   BMI 27.44 kg/m²     Additional Measurements    04/24/23 1132   Neck circumference (Inches): 13       Physical Exam  Vitals reviewed. Constitutional:       General: She is not in acute distress. Appearance: Normal appearance. She is not ill-appearing, toxic-appearing or diaphoretic. HENT:      Head: Normocephalic and atraumatic. Nose: Nose normal. No congestion or rhinorrhea. Mouth/Throat:      Mouth: Mucous membranes are moist.      Pharynx: Oropharynx is clear. No oropharyngeal exudate or posterior oropharyngeal erythema. Comments: Mallampati: 3  Eyes:      Pupils: Pupils are equal, round, and reactive to light. Cardiovascular:      Rate and Rhythm: Normal rate. Pulmonary:      Effort: Pulmonary effort is normal. No respiratory distress. Breath sounds: Normal breath sounds. No stridor. No wheezing, rhonchi or rales. Chest:      Chest wall: No tenderness. Abdominal:      Palpations: Abdomen is soft. Tenderness: There is no abdominal tenderness. There is no guarding or rebound. Musculoskeletal:         General: Normal range of motion. Cervical back: Neck supple. Right lower leg: No edema. Left lower leg: No edema. Lymphadenopathy:      Cervical: No cervical adenopathy. Skin:     General: Skin is warm and dry. Capillary Refill: Capillary refill takes less than 2 seconds. Neurological:      Mental Status: She is alert and oriented to person, place, and time. Psychiatric:         Mood and Affect: Mood normal.         Behavior: Behavior normal.         Thought Content: Thought content normal.         Judgment: Judgment normal.        Assessment/Plan:     1. SUE (obstructive sleep apnea)  2. Overweight (BMI 25.0-29.9)  3. RLS (restless legs syndrome)  4. Hypersomnia  5.  Primary hypertension            RECOMMENDATIONS:     Albina Thompson will be scheduled for polysomnography in order to Patient

## 2023-05-03 NOTE — HISTORY OF PRESENT ILLNESS
[FreeTextEntry8] : 36 yo female presents to the office for persistent URI symptoms. the patient reports has already completed steroids, zpack, but still experiencing sneezing, nose itching, post nasal drip. reports worsening cough, usually in the morning. thought she heard wheezing yesterday morning, but resolved quickly. denies any chest pain, shortness of breath. denies any known season/environmental allergies. denies any fevers, chills.

## 2023-05-03 NOTE — REVIEW OF SYSTEMS
[Chills] : chills [Fatigue] : fatigue [Nasal Discharge] : nasal discharge [Sore Throat] : sore throat [Postnasal Drip] : postnasal drip [Cough] : cough [Negative] : Psychiatric [Fever] : no fever [Hot Flashes] : no hot flashes [Night Sweats] : no night sweats [Recent Change In Weight] : ~T no recent weight change [Earache] : no earache [Hearing Loss] : no hearing loss [Nosebleed] : no nosebleeds [Hoarseness] : no hoarseness [Shortness Of Breath] : no shortness of breath [Wheezing] : no wheezing [Dyspnea on Exertion] : no dyspnea on exertion

## 2023-05-03 NOTE — PHYSICAL EXAM
[Normal Outer Ear/Nose] : the outer ears and nose were normal in appearance [Normal TMs] : both tympanic membranes were normal [No Lymphadenopathy] : no lymphadenopathy [Normal Supraclavicular Nodes] : no supraclavicular lymphadenopathy [Coordination Grossly Intact] : coordination grossly intact [No Focal Deficits] : no focal deficits [Normal Gait] : normal gait [Speech Grossly Normal] : speech grossly normal [Alert and Oriented x3] : oriented to person, place, and time [Normal Mood] : the mood was normal [Normal] : affect was normal and insight and judgment were intact [de-identified] : PND

## 2023-05-14 ENCOUNTER — EMERGENCY (EMERGENCY)
Facility: HOSPITAL | Age: 38
LOS: 1 days | Discharge: HOME | End: 2023-05-14
Attending: EMERGENCY MEDICINE
Payer: COMMERCIAL

## 2023-05-14 ENCOUNTER — TRANSCRIPTION ENCOUNTER (OUTPATIENT)
Age: 38
End: 2023-05-14

## 2023-05-14 VITALS
DIASTOLIC BLOOD PRESSURE: 57 MMHG | SYSTOLIC BLOOD PRESSURE: 97 MMHG | TEMPERATURE: 99 F | OXYGEN SATURATION: 98 % | HEART RATE: 91 BPM | RESPIRATION RATE: 18 BRPM

## 2023-05-14 VITALS
HEART RATE: 117 BPM | DIASTOLIC BLOOD PRESSURE: 66 MMHG | WEIGHT: 143.08 LBS | SYSTOLIC BLOOD PRESSURE: 107 MMHG | RESPIRATION RATE: 16 BRPM | OXYGEN SATURATION: 98 % | TEMPERATURE: 98 F | HEIGHT: 68 IN

## 2023-05-14 DIAGNOSIS — Z98.89 OTHER SPECIFIED POSTPROCEDURAL STATES: Chronic | ICD-10-CM

## 2023-05-14 DIAGNOSIS — Z98.890 OTHER SPECIFIED POSTPROCEDURAL STATES: Chronic | ICD-10-CM

## 2023-05-14 DIAGNOSIS — K08.409 PARTIAL LOSS OF TEETH, UNSPECIFIED CAUSE, UNSPECIFIED CLASS: Chronic | ICD-10-CM

## 2023-05-14 LAB
ALBUMIN SERPL ELPH-MCNC: 4.5 G/DL — SIGNIFICANT CHANGE UP (ref 3.3–5)
ALP SERPL-CCNC: 59 U/L — SIGNIFICANT CHANGE UP (ref 40–120)
ALT FLD-CCNC: 11 U/L — SIGNIFICANT CHANGE UP (ref 10–45)
ANION GAP SERPL CALC-SCNC: 11 MMOL/L — SIGNIFICANT CHANGE UP (ref 5–17)
AST SERPL-CCNC: 17 U/L — SIGNIFICANT CHANGE UP (ref 10–40)
BASOPHILS # BLD AUTO: 0.03 K/UL — SIGNIFICANT CHANGE UP (ref 0–0.2)
BASOPHILS NFR BLD AUTO: 0.2 % — SIGNIFICANT CHANGE UP (ref 0–2)
BILIRUB SERPL-MCNC: 1.2 MG/DL — SIGNIFICANT CHANGE UP (ref 0.2–1.2)
BUN SERPL-MCNC: 12 MG/DL — SIGNIFICANT CHANGE UP (ref 7–23)
CALCIUM SERPL-MCNC: 8.9 MG/DL — SIGNIFICANT CHANGE UP (ref 8.4–10.5)
CHLORIDE SERPL-SCNC: 104 MMOL/L — SIGNIFICANT CHANGE UP (ref 96–108)
CO2 SERPL-SCNC: 24 MMOL/L — SIGNIFICANT CHANGE UP (ref 22–31)
CREAT SERPL-MCNC: 0.66 MG/DL — SIGNIFICANT CHANGE UP (ref 0.5–1.3)
EGFR: 115 ML/MIN/1.73M2 — SIGNIFICANT CHANGE UP
EOSINOPHIL # BLD AUTO: 0.02 K/UL — SIGNIFICANT CHANGE UP (ref 0–0.5)
EOSINOPHIL NFR BLD AUTO: 0.2 % — SIGNIFICANT CHANGE UP (ref 0–6)
GLUCOSE SERPL-MCNC: 112 MG/DL — HIGH (ref 70–99)
HCG UR QL: NEGATIVE — SIGNIFICANT CHANGE UP
HCT VFR BLD CALC: 42.7 % — SIGNIFICANT CHANGE UP (ref 34.5–45)
HGB BLD-MCNC: 14.6 G/DL — SIGNIFICANT CHANGE UP (ref 11.5–15.5)
IMM GRANULOCYTES NFR BLD AUTO: 0.3 % — SIGNIFICANT CHANGE UP (ref 0–0.9)
LIDOCAIN IGE QN: 19 U/L — SIGNIFICANT CHANGE UP (ref 7–60)
LYMPHOCYTES # BLD AUTO: 0.28 K/UL — LOW (ref 1–3.3)
LYMPHOCYTES # BLD AUTO: 2.3 % — LOW (ref 13–44)
MAGNESIUM SERPL-MCNC: 1.8 MG/DL — SIGNIFICANT CHANGE UP (ref 1.6–2.6)
MCHC RBC-ENTMCNC: 33.2 PG — SIGNIFICANT CHANGE UP (ref 27–34)
MCHC RBC-ENTMCNC: 34.2 GM/DL — SIGNIFICANT CHANGE UP (ref 32–36)
MCV RBC AUTO: 97 FL — SIGNIFICANT CHANGE UP (ref 80–100)
MONOCYTES # BLD AUTO: 0.19 K/UL — SIGNIFICANT CHANGE UP (ref 0–0.9)
MONOCYTES NFR BLD AUTO: 1.6 % — LOW (ref 2–14)
NEUTROPHILS # BLD AUTO: 11.57 K/UL — HIGH (ref 1.8–7.4)
NEUTROPHILS NFR BLD AUTO: 95.4 % — HIGH (ref 43–77)
NRBC # BLD: 0 /100 WBCS — SIGNIFICANT CHANGE UP (ref 0–0)
PLATELET # BLD AUTO: 218 K/UL — SIGNIFICANT CHANGE UP (ref 150–400)
POTASSIUM SERPL-MCNC: 4.2 MMOL/L — SIGNIFICANT CHANGE UP (ref 3.5–5.3)
POTASSIUM SERPL-SCNC: 4.2 MMOL/L — SIGNIFICANT CHANGE UP (ref 3.5–5.3)
PROT SERPL-MCNC: 7.6 G/DL — SIGNIFICANT CHANGE UP (ref 6–8.3)
RBC # BLD: 4.4 M/UL — SIGNIFICANT CHANGE UP (ref 3.8–5.2)
RBC # FLD: 11.9 % — SIGNIFICANT CHANGE UP (ref 10.3–14.5)
SODIUM SERPL-SCNC: 139 MMOL/L — SIGNIFICANT CHANGE UP (ref 135–145)
WBC # BLD: 12.13 K/UL — HIGH (ref 3.8–10.5)
WBC # FLD AUTO: 12.13 K/UL — HIGH (ref 3.8–10.5)

## 2023-05-14 PROCEDURE — 96374 THER/PROPH/DIAG INJ IV PUSH: CPT

## 2023-05-14 PROCEDURE — 99284 EMERGENCY DEPT VISIT MOD MDM: CPT

## 2023-05-14 PROCEDURE — 83690 ASSAY OF LIPASE: CPT

## 2023-05-14 PROCEDURE — 96375 TX/PRO/DX INJ NEW DRUG ADDON: CPT

## 2023-05-14 PROCEDURE — 81025 URINE PREGNANCY TEST: CPT

## 2023-05-14 PROCEDURE — 80053 COMPREHEN METABOLIC PANEL: CPT

## 2023-05-14 PROCEDURE — 71046 X-RAY EXAM CHEST 2 VIEWS: CPT | Mod: 26

## 2023-05-14 PROCEDURE — 85025 COMPLETE CBC W/AUTO DIFF WBC: CPT

## 2023-05-14 PROCEDURE — 99285 EMERGENCY DEPT VISIT HI MDM: CPT | Mod: 25

## 2023-05-14 PROCEDURE — 83735 ASSAY OF MAGNESIUM: CPT

## 2023-05-14 PROCEDURE — 71046 X-RAY EXAM CHEST 2 VIEWS: CPT

## 2023-05-14 PROCEDURE — 93005 ELECTROCARDIOGRAM TRACING: CPT

## 2023-05-14 RX ORDER — SODIUM CHLORIDE 9 MG/ML
1000 INJECTION, SOLUTION INTRAVENOUS ONCE
Refills: 0 | Status: COMPLETED | OUTPATIENT
Start: 2023-05-14 | End: 2023-05-14

## 2023-05-14 RX ORDER — ONDANSETRON 8 MG/1
4 TABLET, FILM COATED ORAL ONCE
Refills: 0 | Status: COMPLETED | OUTPATIENT
Start: 2023-05-14 | End: 2023-05-14

## 2023-05-14 RX ORDER — FAMOTIDINE 10 MG/ML
20 INJECTION INTRAVENOUS ONCE
Refills: 0 | Status: COMPLETED | OUTPATIENT
Start: 2023-05-14 | End: 2023-05-14

## 2023-05-14 RX ORDER — KETOROLAC TROMETHAMINE 30 MG/ML
15 SYRINGE (ML) INJECTION ONCE
Refills: 0 | Status: DISCONTINUED | OUTPATIENT
Start: 2023-05-14 | End: 2023-05-14

## 2023-05-14 RX ORDER — ACETAMINOPHEN 500 MG
975 TABLET ORAL ONCE
Refills: 0 | Status: COMPLETED | OUTPATIENT
Start: 2023-05-14 | End: 2023-05-14

## 2023-05-14 RX ADMIN — SODIUM CHLORIDE 4000 MILLILITER(S): 9 INJECTION, SOLUTION INTRAVENOUS at 10:35

## 2023-05-14 RX ADMIN — Medication 975 MILLIGRAM(S): at 12:36

## 2023-05-14 RX ADMIN — SODIUM CHLORIDE 4000 MILLILITER(S): 9 INJECTION, SOLUTION INTRAVENOUS at 11:19

## 2023-05-14 RX ADMIN — Medication 15 MILLIGRAM(S): at 14:24

## 2023-05-14 RX ADMIN — ONDANSETRON 4 MILLIGRAM(S): 8 TABLET, FILM COATED ORAL at 10:36

## 2023-05-14 RX ADMIN — FAMOTIDINE 20 MILLIGRAM(S): 10 INJECTION INTRAVENOUS at 10:35

## 2023-05-14 NOTE — ED PROVIDER NOTE - OBJECTIVE STATEMENT
36 yo F with a PMH of migraines, asthma, vasovagal syncope p/w nausea, vomiting and watery diarrhea since last night. A/w crampy abd pain. States her 19 mo old son was sick with the same sxs on Friday, must have caught stomach virus from him. Pt stats she has a hx of post emesis syncope, she had two syncopal episodes this morning at 7AM and 8:30AM after she vomited. No hematemsis. Did not fall, no head trauma.  was at her side when she was vomiting and caught her both times. Pt reports her  heard her breath abnormally during second episode and became concerned. Called on call PMD and they advised her to come to ED. Denies fever, chills, chest pain, SOB, cough, blood in stool, urinary complaints. No recent travel.

## 2023-05-14 NOTE — ED ADULT NURSE NOTE - NSICDXPASTSURGICALHX_GEN_ALL_CORE_FT
PAST SURGICAL HISTORY:  H/O inguinal hernia repair 2019    H/O:      S/P craniotomy 2019  revision of monisha 10/19    [place] teeth extracted 2014

## 2023-05-14 NOTE — ED ADULT NURSE NOTE - OBJECTIVE STATEMENT
pt 37 to female states vomiting onset early am with soft stools x 3 pt states she faints when vomiting but denies any head injury pt states her 2 yo had stomach virus 2 days ago pt had recent travel to state of [place] 5/1-5/5 pt afebrile on arrival skin warm dry exam by md with labs and urine sent pt with iv fluids in progress as ordered

## 2023-05-14 NOTE — ED PROVIDER NOTE - ATTENDING APP SHARED VISIT CONTRIBUTION OF CARE
------------ATTENDING NOTE------------  pt c/o 36 hrs of nausea, small amts nbnb vomiting, looser nb stools, crampy abdominal discomfort, similar to her 1 yr son, c/w viral process, complicated as pt has h/o syncope w/ vomiting and had syncope twice (no trauma, witnessed/caught by , no chest pain/discomfort or sob/dyspnea or palpitations), dehydrated, well appearing, soft benign abd, no fevers, no rash, awaiting labs/imaging and close reassessments -->  - Dereck Rojas MD   -----------------------------------------------

## 2023-05-14 NOTE — ED ADULT TRIAGE NOTE - CHIEF COMPLAINT QUOTE
NVD took Zofran at home fainted two times this morning denies hitting head/ trauma,  baby sick at home

## 2023-05-14 NOTE — ED PROVIDER NOTE - NSICDXPASTSURGICALHX_GEN_ALL_CORE_FT
PAST SURGICAL HISTORY:  H/O inguinal hernia repair 2019    H/O:      S/P craniotomy 2019  revision of monisha 10/19    [place] teeth extracted 2014

## 2023-05-14 NOTE — ED PROVIDER NOTE - PHYSICAL EXAMINATION
CONSTITUTIONAL: Well appearing and in no apparent distress.  ENT: Airway patent, dry mucous membranes.   EYES: Pupils equal, round and reactive to light. EOMI. Conjunctiva normal appearing.   CARDIAC: Tachycardic, regular rhythm.  Heart sounds S1, S2.    RESPIRATORY: Breath sounds clear and equal bilaterally.   GASTROINTESTINAL: Abdomen soft, non-tender, not distended. No rebound or guarding. No CVAT.   MUSCULOSKELETAL: Spine appears normal.  NEUROLOGICAL: Alert and oriented x3, no focal deficits, no motor or sensory deficits. 5/5 muscle strength throughout.  SKIN: Skin normal color, warm, dry and intact.   PSYCHIATRIC: Normal mood and affect.

## 2023-05-14 NOTE — ED ADULT NURSE REASSESSMENT NOTE - NS ED NURSE REASSESS COMMENT FT1
pt tolerated po intake no vomiting in er and no stool passed pt discharged with f/u with pmd given by PA from [facility]

## 2023-05-14 NOTE — ED ADULT NURSE NOTE - NSFALLUNIVINTERV_ED_ALL_ED
Bed/Stretcher in lowest position, wheels locked, appropriate side rails in place/Call bell, personal items and telephone in reach/Instruct patient to call for assistance before getting out of bed/chair/stretcher/Non-slip footwear applied when patient is off stretcher/Jelm to call system/Physically safe environment - no spills, clutter or unnecessary equipment/Purposeful proactive rounding/Room/bathroom lighting operational, light cord in reach

## 2023-05-14 NOTE — ED PROVIDER NOTE - CARE PLAN
1 Principal Discharge DX:	Nausea vomiting and diarrhea  Secondary Diagnosis:	Moderate dehydration  Secondary Diagnosis:	Situational syncope

## 2023-05-14 NOTE — ED PROVIDER NOTE - PATIENT PORTAL LINK FT
You can access the FollowMyHealth Patient Portal offered by [facility] by registering at the following website: [email or web address]. By joining [facility]’s FollowMyHealth portal, you will also be able to view your health information using other applications (apps) compatible with our system.

## 2023-05-14 NOTE — ED ADULT NURSE NOTE - NSICDXFAMILYHX_GEN_ALL_CORE_FT
FAMILY HISTORY:  Mother  Still living? No  FH: breast cancer, Age at diagnosis: Age Unknown  FH: coronary artery disease, Age at diagnosis: Age Unknown     Patient and family preoperative education and comfort measures given

## 2023-05-14 NOTE — ED PROVIDER NOTE - NSFOLLOWUPINSTRUCTIONS_ED_ALL_ED_FT
See your Primary Doctor this week for follow up -- call to discuss.    Stay well hydrated, eat regular healthy meals, get plenty of rest.    Use ZOFRAN ODT as directed for nausea/vomiting -- see medication warnings.    See GASTROENTERITIS and SYNCOPE information and return instructions given to you.    Seek immediate medical care for new/worsening symptoms/concerns.

## 2023-05-16 ENCOUNTER — OUTPATIENT (OUTPATIENT)
Dept: OUTPATIENT SERVICES | Facility: HOSPITAL | Age: 38
LOS: 1 days | End: 2023-05-16
Payer: COMMERCIAL

## 2023-05-16 ENCOUNTER — APPOINTMENT (OUTPATIENT)
Dept: FAMILY MEDICINE | Facility: CLINIC | Age: 38
End: 2023-05-16
Payer: COMMERCIAL

## 2023-05-16 ENCOUNTER — APPOINTMENT (OUTPATIENT)
Dept: MRI IMAGING | Facility: CLINIC | Age: 38
End: 2023-05-16
Payer: COMMERCIAL

## 2023-05-16 DIAGNOSIS — K08.409 PARTIAL LOSS OF TEETH, UNSPECIFIED CAUSE, UNSPECIFIED CLASS: Chronic | ICD-10-CM

## 2023-05-16 DIAGNOSIS — Z00.8 ENCOUNTER FOR OTHER GENERAL EXAMINATION: ICD-10-CM

## 2023-05-16 DIAGNOSIS — Z98.890 OTHER SPECIFIED POSTPROCEDURAL STATES: Chronic | ICD-10-CM

## 2023-05-16 PROCEDURE — A9585: CPT

## 2023-05-16 PROCEDURE — C8908: CPT

## 2023-05-16 PROCEDURE — C8937: CPT

## 2023-05-16 PROCEDURE — 77049 MRI BREAST C-+ W/CAD BI: CPT | Mod: 26

## 2023-05-16 PROCEDURE — 99213 OFFICE O/P EST LOW 20 MIN: CPT | Mod: 95

## 2023-05-17 ENCOUNTER — APPOINTMENT (OUTPATIENT)
Dept: FAMILY MEDICINE | Facility: CLINIC | Age: 38
End: 2023-05-17
Payer: COMMERCIAL

## 2023-05-17 VITALS
OXYGEN SATURATION: 99 % | SYSTOLIC BLOOD PRESSURE: 136 MMHG | HEART RATE: 87 BPM | DIASTOLIC BLOOD PRESSURE: 70 MMHG | TEMPERATURE: 98.2 F | RESPIRATION RATE: 14 BRPM

## 2023-05-17 PROCEDURE — 99213 OFFICE O/P EST LOW 20 MIN: CPT | Mod: 25

## 2023-05-17 PROCEDURE — 87880 STREP A ASSAY W/OPTIC: CPT | Mod: QW

## 2023-05-19 ENCOUNTER — NON-APPOINTMENT (OUTPATIENT)
Age: 38
End: 2023-05-19

## 2023-05-22 ENCOUNTER — OUTPATIENT (OUTPATIENT)
Dept: OUTPATIENT SERVICES | Facility: HOSPITAL | Age: 38
LOS: 1 days | End: 2023-05-22
Payer: COMMERCIAL

## 2023-05-22 ENCOUNTER — APPOINTMENT (OUTPATIENT)
Dept: MRI IMAGING | Facility: IMAGING CENTER | Age: 38
End: 2023-05-22
Payer: COMMERCIAL

## 2023-05-22 DIAGNOSIS — K08.409 PARTIAL LOSS OF TEETH, UNSPECIFIED CAUSE, UNSPECIFIED CLASS: Chronic | ICD-10-CM

## 2023-05-22 DIAGNOSIS — Z98.89 OTHER SPECIFIED POSTPROCEDURAL STATES: Chronic | ICD-10-CM

## 2023-05-22 DIAGNOSIS — Z00.8 ENCOUNTER FOR OTHER GENERAL EXAMINATION: ICD-10-CM

## 2023-05-22 DIAGNOSIS — Z98.890 OTHER SPECIFIED POSTPROCEDURAL STATES: Chronic | ICD-10-CM

## 2023-05-22 PROCEDURE — A9585: CPT

## 2023-05-22 PROCEDURE — 88305 TISSUE EXAM BY PATHOLOGIST: CPT | Mod: 26

## 2023-05-22 PROCEDURE — A4648: CPT

## 2023-05-22 PROCEDURE — 88305 TISSUE EXAM BY PATHOLOGIST: CPT

## 2023-05-22 PROCEDURE — 77065 DX MAMMO INCL CAD UNI: CPT

## 2023-05-22 PROCEDURE — 19085 BX BREAST 1ST LESION MR IMAG: CPT | Mod: LT

## 2023-05-22 PROCEDURE — 19085 BX BREAST 1ST LESION MR IMAG: CPT

## 2023-05-22 PROCEDURE — 77065 DX MAMMO INCL CAD UNI: CPT | Mod: 26,LT

## 2023-05-24 LAB
BACTERIA THROAT CULT: NORMAL
SURGICAL PATHOLOGY STUDY: SIGNIFICANT CHANGE UP

## 2023-05-24 NOTE — HISTORY OF PRESENT ILLNESS
[Home] : at home, [unfilled] , at the time of the visit. [Medical Office: ([facility])___] : at the medical office located in  [Verbal consent obtained from patient] : the patient, [unfilled] [FreeTextEntry8] : for ER follow up today \par on sunday at 4:30 am tossing all night \par usually faints with vomiting \par took zofran \par threw up again and fainting and waking up from it \par at 8:30 am occurred again and then fainted again \par called on call\par went to ED- [facility]\par HR was 100 bpm at the time\par did an EKG \par bp was normal at the time\par given fluids, tyelnol and motrin \par yesterday took the day off \par feeling better\par denies any chest pain or sob\par denies any other associated symptoms\par denies any other complaints or concerns at this time

## 2023-05-25 NOTE — HISTORY OF PRESENT ILLNESS
[FreeTextEntry8] : 38 yo f, pmhx of meningioma, here for sore throat \par c/o cough and congestion\par sx have been ongoing since 1-2 days\par symtoms include: sore throat and mild pain with swallowing\par denies any fever or chills, cough \par Sick contacts- daughter with strep\par recent travel- none\par self treatment- none\par tested prior- covid negative\par vaccine status- utd \par denies any other associated symptoms\par denies any other complaints or concerns at this time

## 2023-05-25 NOTE — ASSESSMENT
[FreeTextEntry1] : VSS, exam normal\par RST negative \par will follow up throat culture \par advised to increase fluid intake, rest, and acetaminophen/ibuprofen prn pain/fever\par follow up in office if sx persists or worsens\par patient verbalizes understanding and is stable upon d/c\par

## 2023-05-30 ENCOUNTER — APPOINTMENT (OUTPATIENT)
Dept: ELECTROPHYSIOLOGY | Facility: CLINIC | Age: 38
End: 2023-05-30
Payer: COMMERCIAL

## 2023-05-30 ENCOUNTER — APPOINTMENT (OUTPATIENT)
Dept: CARDIOLOGY | Facility: CLINIC | Age: 38
End: 2023-05-30
Payer: COMMERCIAL

## 2023-05-30 ENCOUNTER — NON-APPOINTMENT (OUTPATIENT)
Age: 38
End: 2023-05-30

## 2023-05-30 VITALS
WEIGHT: 139 LBS | OXYGEN SATURATION: 98 % | HEART RATE: 77 BPM | DIASTOLIC BLOOD PRESSURE: 73 MMHG | SYSTOLIC BLOOD PRESSURE: 108 MMHG | BODY MASS INDEX: 27.29 KG/M2 | HEIGHT: 60 IN

## 2023-05-30 DIAGNOSIS — R55 SYNCOPE AND COLLAPSE: ICD-10-CM

## 2023-05-30 PROCEDURE — 99214 OFFICE O/P EST MOD 30 MIN: CPT | Mod: 25

## 2023-05-30 PROCEDURE — 93000 ELECTROCARDIOGRAM COMPLETE: CPT

## 2023-05-30 NOTE — ASSESSMENT
[FreeTextEntry1] : Assessment: \par 1.  Near syncope/Palpitations\par 2.  Prior history of vasovagal with vomiting in the past\par \par Plan\par 1.  Will repeat another holter monitor x 24 hours\par 2.  2d echo - normal\par 3.  Treadmill exercise stress test - normal\par 4.  Avoidance of triggers such as caffiene, stay well hydrated, sleep well, stress reduction\par 5.   Of note she is having lots of stressors in her life\par 6.  Will see what testing reveals.  She already made an apt to see Dr. Noel\par \par

## 2023-05-30 NOTE — REASON FOR VISIT
[Follow-Up - Clinic] : a clinic follow-up of [FreeTextEntry1] : 5/30/23\par \par Recent ER visit on 5/14 after vomiting due to viral gastroenteritis with subsequent syncope. Loss of consciousness for a "few seconds" and no head strike. No C/P or SOB. No dyspnea on climbing stairs. Exercises regularly. Palpitations at times stress related. \par \par In 2018: Echo and Exercise stress test were normal. Cardiac MRI was normal.\par \par Medications:\par None\par \par \par 6/2018\par \par 1 week ago, after receiving good news, was sitting in her office. Then felt heart palpitations, felt her vision getting darker, was breathing in and out slowly to try to calm down, prevented hersefl from fainting - but did not pass out.  The episode lasted for 20 seconds.   She felt like she had to urinate afterwards - she did not lose continence.   Her PCP Dr. Pina asked her to go to a  urgent care, labwork and ecg was performed which was normal.  Had 2 espresso shots that morning , didn’t sleep well night before, was told she was dehydrated. The night before she didn’t sleep well because her  was up and aroudn at night time.   Since then, she has some "jumps" in her heart but without dizziness/lightheadedness.  She is scared of this moment.\par \par Otherwise she is active, she has good blood pressure. \par \par Medications:\par None\par MVI at times\par  \par \par She reports that is she has a migraine and tries to vomit - or vomits hard she reports syncope from this - was told that she has vasovagal reaction.  \par \par Allergies:  NKDA, shrimp\par \par Pshx:  C-cetion July 2015\par 1 child - healthy - girl\par \par Family hx:\par Grandfather- Valve surgery, lung cancer\par Siblings- 2 sisters - twin sister - has vasovagal episoded and severe anxiety\par \par She denies any anxiety\par She is not pregnant- she is currently mensurating.  She started her period yesterday.\par She reports some stressors on tuesday/weds due to husbands trip to [place].  \par \par She has lots of stressors at work;  "its high pressure"\par \par Never smoker\par 2-3 drinks per week.\par \par \par \par She feels that the episode was a combination of poor sleep, too much caffiene and not enough water. \par She exercises - has a [employer], walks from [employer] to her home 45 minutes of walking wihtout any issues. She has been avoiding caffiene since then.  Had a small amount of caffiene earlier today \par \par She states that her  recently purcahsed her an espresso machine at home. \par \par No family history of sudden cardiac death.

## 2023-05-30 NOTE — PHYSICAL EXAM
[General Appearance - Well Developed] : well developed [Normal Appearance] : normal appearance [Well Groomed] : well groomed [General Appearance - Well Nourished] : well nourished [No Deformities] : no deformities [General Appearance - In No Acute Distress] : no acute distress [Normal Jugular Venous A Waves Present] : normal jugular venous A waves present [Normal Jugular Venous V Waves Present] : normal jugular venous V waves present [No Jugular Venous Fragoso A Waves] : no jugular venous fragoso A waves [Respiration, Rhythm And Depth] : normal respiratory rhythm and effort [Exaggerated Use Of Accessory Muscles For Inspiration] : no accessory muscle use [Auscultation Breath Sounds / Voice Sounds] : lungs were clear to auscultation bilaterally [Heart Rate And Rhythm] : heart rate and rhythm were normal [Heart Sounds] : normal S1 and S2 [Murmurs] : no murmurs present [Abdomen Soft] : soft [Abdomen Tenderness] : non-tender [Abdomen Mass (___ Cm)] : no abdominal mass palpated [Nail Clubbing] : no clubbing of the fingernails [Cyanosis, Localized] : no localized cyanosis [Petechial Hemorrhages (___cm)] : no petechial hemorrhages [Skin Color & Pigmentation] : normal skin color and pigmentation [] : no rash [No Venous Stasis] : no venous stasis [Skin Lesions] : no skin lesions [No Skin Ulcers] : no skin ulcer [No Xanthoma] : no  xanthoma was observed [Oriented To Time, Place, And Person] : oriented to person, place, and time [Affect] : the affect was normal [Mood] : the mood was normal [No Anxiety] : not feeling anxious

## 2023-05-30 NOTE — HISTORY OF PRESENT ILLNESS
[FreeTextEntry1] : Followup visit 8/9/2018\par \par Holter showed PVC's and triplet\par Exercise treadmill ECG without any arrythmia 12 METS\par echo with normal LV function\par \par Reports stressors at home -  with ETOH issues\par She is safe, no harms.  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  \par reports she has a twin sister who also has dizziness and palpitations at times.

## 2023-05-31 ENCOUNTER — NON-APPOINTMENT (OUTPATIENT)
Age: 38
End: 2023-05-31

## 2023-05-31 LAB — TSH SERPL-ACNC: 1.22 UIU/ML

## 2023-06-05 ENCOUNTER — NON-APPOINTMENT (OUTPATIENT)
Age: 38
End: 2023-06-05

## 2023-06-05 ENCOUNTER — APPOINTMENT (OUTPATIENT)
Dept: FAMILY MEDICINE | Facility: CLINIC | Age: 38
End: 2023-06-05
Payer: COMMERCIAL

## 2023-06-05 VITALS
TEMPERATURE: 98.2 F | OXYGEN SATURATION: 96 % | HEART RATE: 92 BPM | SYSTOLIC BLOOD PRESSURE: 116 MMHG | DIASTOLIC BLOOD PRESSURE: 70 MMHG | RESPIRATION RATE: 14 BRPM

## 2023-06-05 PROCEDURE — 99214 OFFICE O/P EST MOD 30 MIN: CPT

## 2023-06-05 PROCEDURE — 93227 XTRNL ECG REC<48 HR R&I: CPT

## 2023-06-05 NOTE — PHYSICAL EXAM
[Normal] : no posterior cervical lymphadenopathy and no anterior cervical lymphadenopathy [de-identified] : + post nasal drip, hypertonic nasal turbinates b/l, mild left sided maxillary sinus pressure

## 2023-06-05 NOTE — REVIEW OF SYSTEMS
[Fever] : fever [Earache] : earache [Sore Throat] : sore throat [Postnasal Drip] : postnasal drip [Negative] : Respiratory

## 2023-06-05 NOTE — HISTORY OF PRESENT ILLNESS
[FreeTextEntry8] : 38 yo f, pmhx of meningioma, c/o left ear pain\par symptoms have been ongoing since friday \par symptoms include: left gland swollen, swallowing pain, fever 100.8 on friday, post nasal drip \par denies any cough \par Sick contacts- son with similar symptoms \par recent travel- none \par self treatment- motrin and tylenol \par tested prior- strep, flu,covid over the weekend\par vaccine status- utd\par denies any other associated symptoms  \par denies any other complaints or concerns at this time\par

## 2023-06-05 NOTE — ASSESSMENT
[FreeTextEntry1] : VSS\par strep and covid tests neg at urgent care \par medication as prescribed, medication education done \par advised fluticasone nasal spray daily \par advised to increase fluid intake, rest, and acetaminophen/ibuprofen prn pain/fever\par follow up in office if sx persists or worsens\par patient verbalizes understanding and is stable upon d/c\par

## 2023-06-24 ENCOUNTER — APPOINTMENT (OUTPATIENT)
Dept: CT IMAGING | Facility: CLINIC | Age: 38
End: 2023-06-24
Payer: COMMERCIAL

## 2023-06-24 ENCOUNTER — OUTPATIENT (OUTPATIENT)
Dept: OUTPATIENT SERVICES | Facility: HOSPITAL | Age: 38
LOS: 1 days | End: 2023-06-24
Payer: COMMERCIAL

## 2023-06-24 DIAGNOSIS — K08.409 PARTIAL LOSS OF TEETH, UNSPECIFIED CAUSE, UNSPECIFIED CLASS: Chronic | ICD-10-CM

## 2023-06-24 DIAGNOSIS — Z98.890 OTHER SPECIFIED POSTPROCEDURAL STATES: Chronic | ICD-10-CM

## 2023-06-24 DIAGNOSIS — Z00.8 ENCOUNTER FOR OTHER GENERAL EXAMINATION: ICD-10-CM

## 2023-06-24 DIAGNOSIS — Z98.89 OTHER SPECIFIED POSTPROCEDURAL STATES: Chronic | ICD-10-CM

## 2023-06-24 PROCEDURE — 74177 CT ABD & PELVIS W/CONTRAST: CPT | Mod: 26

## 2023-06-24 PROCEDURE — 74177 CT ABD & PELVIS W/CONTRAST: CPT

## 2023-06-27 ENCOUNTER — APPOINTMENT (OUTPATIENT)
Dept: ELECTROPHYSIOLOGY | Facility: CLINIC | Age: 38
End: 2023-06-27
Payer: COMMERCIAL

## 2023-06-27 VITALS
HEART RATE: 69 BPM | HEIGHT: 60 IN | OXYGEN SATURATION: 100 % | SYSTOLIC BLOOD PRESSURE: 114 MMHG | BODY MASS INDEX: 27.29 KG/M2 | WEIGHT: 139 LBS | DIASTOLIC BLOOD PRESSURE: 75 MMHG

## 2023-06-27 PROCEDURE — 99213 OFFICE O/P EST LOW 20 MIN: CPT | Mod: 25

## 2023-06-27 PROCEDURE — 93000 ELECTROCARDIOGRAM COMPLETE: CPT

## 2023-06-27 RX ORDER — FLUTICASONE PROPIONATE 50 UG/1
50 SPRAY, METERED NASAL TWICE DAILY
Qty: 1 | Refills: 1 | Status: DISCONTINUED | COMMUNITY
Start: 2023-03-24 | End: 2023-06-27

## 2023-06-27 RX ORDER — FLUTICASONE PROPIONATE 50 UG/1
50 SPRAY, METERED NASAL TWICE DAILY
Qty: 1 | Refills: 0 | Status: DISCONTINUED | COMMUNITY
Start: 2023-06-05 | End: 2023-06-27

## 2023-06-27 RX ORDER — FLUTICASONE PROPIONATE AND SALMETEROL 50; 100 UG/1; UG/1
100-50 POWDER RESPIRATORY (INHALATION)
Qty: 1 | Refills: 1 | Status: DISCONTINUED | COMMUNITY
Start: 2023-04-18 | End: 2023-06-27

## 2023-06-27 RX ORDER — AMOXICILLIN AND CLAVULANATE POTASSIUM 875; 125 MG/1; MG/1
875-125 TABLET, COATED ORAL
Qty: 14 | Refills: 0 | Status: DISCONTINUED | COMMUNITY
Start: 2023-06-05 | End: 2023-06-27

## 2023-06-28 NOTE — HISTORY OF PRESENT ILLNESS
[FreeTextEntry1] : 38 year old female with a past medical history of vasovagal syncope initially seen in 2018 who returns today for further evaluation.\par \par She was initially seen in 2018 after an episode while sitting at her desk at work where she had an episode of palpitations and near syncope. She did not lose consciousness.  She underwent a 24 hour monitor which showed an episode of a ventricular triplet and a follow up cardiac MRI which was normal.  At this time she also reported of near syncopal events that would occur in the setting of GI illnesses and vomiting, which were also consistent with vasovagal syncope.\par \par She is returning to clinic for further evaluation as her mother passed away recently in her 70s from suspected coronary artery disease.  Since her last visit, she has had another episode of near syncope surrounding a GI illness a few weeks ago after an episode of vomiting. She also states that she experienced palpitations after 8 minutes of jogging, which she has not exerted herself to this extent in many years. She denies chest pain, dyspnea, or syncope.

## 2023-06-28 NOTE — DISCUSSION/SUMMARY
[FreeTextEntry1] : 38 year old female with a past medical history of vasovagal syncope initially seen in 2018 who returns today for further evaluation in light of a recent cardiac diagnosis in her late mother.  She has had few recurrent near syncopal events typical of her previous episodes, most recently surrounding GI illness and vomiting.   She also reports of exertional palpitations. She was recently seen by general cardiology and is to undergo an exercise stress test and echocardiogram, which will provide additional information on the episode of exertional symptoms which we ultimately suspect is due to cardiac deconditioning. She was counseled about appropriate lifestyle modifications to prevent future vagal events from occurring.  We will await for the results of the above testing.  All questions were answered to her apparent satisfaction.

## 2023-06-29 ENCOUNTER — OUTPATIENT (OUTPATIENT)
Dept: OUTPATIENT SERVICES | Facility: HOSPITAL | Age: 38
LOS: 1 days | End: 2023-06-29
Payer: COMMERCIAL

## 2023-06-29 ENCOUNTER — APPOINTMENT (OUTPATIENT)
Dept: CV DIAGNOSTICS | Facility: HOSPITAL | Age: 38
End: 2023-06-29

## 2023-06-29 DIAGNOSIS — Z98.890 OTHER SPECIFIED POSTPROCEDURAL STATES: Chronic | ICD-10-CM

## 2023-06-29 DIAGNOSIS — K08.409 PARTIAL LOSS OF TEETH, UNSPECIFIED CAUSE, UNSPECIFIED CLASS: Chronic | ICD-10-CM

## 2023-06-29 DIAGNOSIS — Z98.89 OTHER SPECIFIED POSTPROCEDURAL STATES: Chronic | ICD-10-CM

## 2023-06-29 DIAGNOSIS — R55 SYNCOPE AND COLLAPSE: ICD-10-CM

## 2023-06-29 PROCEDURE — 93017 CV STRESS TEST TRACING ONLY: CPT

## 2023-06-29 PROCEDURE — 93016 CV STRESS TEST SUPVJ ONLY: CPT

## 2023-06-29 PROCEDURE — 93018 CV STRESS TEST I&R ONLY: CPT

## 2023-07-03 ENCOUNTER — OUTPATIENT (OUTPATIENT)
Dept: OUTPATIENT SERVICES | Facility: HOSPITAL | Age: 38
LOS: 1 days | End: 2023-07-03
Payer: COMMERCIAL

## 2023-07-03 ENCOUNTER — APPOINTMENT (OUTPATIENT)
Dept: CV DIAGNOSITCS | Facility: HOSPITAL | Age: 38
End: 2023-07-03

## 2023-07-03 DIAGNOSIS — Z98.890 OTHER SPECIFIED POSTPROCEDURAL STATES: Chronic | ICD-10-CM

## 2023-07-03 DIAGNOSIS — K08.409 PARTIAL LOSS OF TEETH, UNSPECIFIED CAUSE, UNSPECIFIED CLASS: Chronic | ICD-10-CM

## 2023-07-03 DIAGNOSIS — R55 SYNCOPE AND COLLAPSE: ICD-10-CM

## 2023-07-03 DIAGNOSIS — Z98.89 OTHER SPECIFIED POSTPROCEDURAL STATES: Chronic | ICD-10-CM

## 2023-07-03 PROCEDURE — 76376 3D RENDER W/INTRP POSTPROCES: CPT

## 2023-07-03 PROCEDURE — 93306 TTE W/DOPPLER COMPLETE: CPT | Mod: 26

## 2023-07-03 PROCEDURE — 93356 MYOCRD STRAIN IMG SPCKL TRCK: CPT

## 2023-07-03 PROCEDURE — 76376 3D RENDER W/INTRP POSTPROCES: CPT | Mod: 26

## 2023-07-03 PROCEDURE — 93306 TTE W/DOPPLER COMPLETE: CPT

## 2023-08-01 NOTE — H&P PST ADULT - PMH
Addended by: ZOE DRISCOLL on: 8/1/2023 10:08 AM     Modules accepted: Orders     Migraine without aura and without status migrainosus, not intractable    Mild intermittent asthma without complication  only when ill with cold  Syncope, unspecified syncope type  Only from intense vomiting from migraine, followed by Bert Anxiety  Since dx with meningioma  Migraine without aura and without status migrainosus, not intractable    Mild intermittent asthma without complication  only when ill with cold  Palpitations  Work up done by Dr. Noel 2018  Syncope, unspecified syncope type  Only from intense vomiting from migraine, followed by Bert

## 2023-08-09 ENCOUNTER — APPOINTMENT (OUTPATIENT)
Dept: FAMILY MEDICINE | Facility: CLINIC | Age: 38
End: 2023-08-09
Payer: COMMERCIAL

## 2023-08-09 PROCEDURE — 99213 OFFICE O/P EST LOW 20 MIN: CPT | Mod: 95

## 2023-08-27 NOTE — PHYSICAL EXAM
[Normal] : no acute distress, well nourished, well developed and well-appearing [de-identified] : speaking in complete sentences

## 2023-08-27 NOTE — HISTORY OF PRESENT ILLNESS
[Home] : at home, [unfilled] , at the time of the visit. [Medical Office: ([facility])___] : at the medical office located in  [Verbal consent obtained from patient] : the patient, [unfilled] [FreeTextEntry8] : + covid 19 sx have been ongoing since 2-3 days  symptoms include: nasal congestion, dry throat  denies any sob/wheezing  Sick contacts- coworkers on conference recent travel- [place] for conference  self treatment- none tested prior- today vaccine status- utd  denies any other associated symptoms  denies any other complaints or concerns at this time

## 2023-08-27 NOTE — ASSESSMENT
[FreeTextEntry1] : reviewed treatment options with patient including paxlovid, and conservative treatment  patient will like to c/w conservative treatment  advised to increase fluid intake, rest, and acetaminophen/ibuprofen prn pain/fever advised to c/w isolation for 5 days from positive test if sx resolving, c/w mask post isolation period  advised if sx worsens- including but not limited to sob- to go to ED  advised to follow up if sx persists  patient verbalizes understanding and is stable upon d/c

## 2023-09-08 ENCOUNTER — APPOINTMENT (OUTPATIENT)
Dept: CARDIOLOGY | Facility: CLINIC | Age: 38
End: 2023-09-08
Payer: COMMERCIAL

## 2023-09-08 ENCOUNTER — NON-APPOINTMENT (OUTPATIENT)
Age: 38
End: 2023-09-08

## 2023-09-08 VITALS
OXYGEN SATURATION: 99 % | DIASTOLIC BLOOD PRESSURE: 69 MMHG | WEIGHT: 137 LBS | HEART RATE: 69 BPM | BODY MASS INDEX: 26.9 KG/M2 | SYSTOLIC BLOOD PRESSURE: 104 MMHG | HEIGHT: 60 IN

## 2023-09-08 PROCEDURE — 99213 OFFICE O/P EST LOW 20 MIN: CPT

## 2023-09-08 NOTE — ASSESSMENT
[FreeTextEntry1] : Assessment:  1.  Near syncope/Palpitations 2.  Prior history of vasovagal with vomiting in the past  Plan 1.  Doing well, no cardiac symptoms 2.  Cardiac testing has been normal 3.  Family history of CAD - (mother )- no Calcifications seen on images of heart on CT abd/pelvis 4.  Return in 2 years 5.  LDL slighly over 100, continue diet and exercise, healthy lifestyle

## 2023-09-08 NOTE — REASON FOR VISIT
[Follow-Up - Clinic] : a clinic follow-up of [FreeTextEntry1] : 9/8/2023 Doing well No more palpitations. No syncope Mother passed 2 years ago with CAD Exercising, Running, High intensity training, no symptoms.    5/30/23  Recent ER visit on 5/14 after vomiting due to viral gastroenteritis with subsequent syncope. Loss of consciousness for a "few seconds" and no head strike. No C/P or SOB. No dyspnea on climbing stairs. Exercises regularly. Palpitations at times stress related.   In 2018: Echo and Exercise stress test were normal. Cardiac MRI was normal.  Medications: None   6/2018  1 week ago, after receiving good news, was sitting in her office. Then felt heart palpitations, felt her vision getting darker, was breathing in and out slowly to try to calm down, prevented hersefl from fainting - but did not pass out.  The episode lasted for 20 seconds.   She felt like she had to urinate afterwards - she did not lose continence.   Her PCP Dr. Pina asked her to go to a  urgent care, labwork and ecg was performed which was normal.  Had 2 espresso shots that morning , didn't sleep well night before, was told she was dehydrated. The night before she didn't sleep well because her  was up and aroudn at night time.   Since then, she has some "jumps" in her heart but without dizziness/lightheadedness.  She is scared of this moment.  Otherwise she is active, she has good blood pressure.   Medications: None MVI at times    She reports that is she has a migraine and tries to vomit - or vomits hard she reports syncope from this - was told that she has vasovagal reaction.    Allergies:  NKDA, shrimp  Pshx:  C-cetion July 2015 1 child - healthy - girl  Family hx: Grandfather- Valve surgery, lung cancer Siblings- 2 sisters - twin sister - has vasovagal episoded and severe anxiety  She denies any anxiety She is not pregnant- she is currently mensurating.  She started her period yesterday. She reports some stressors on tuesday/weds due to husbands trip to [place].    She has lots of stressors at work;  "its high pressure"  Never smoker 2-3 drinks per week.    She feels that the episode was a combination of poor sleep, too much caffiene and not enough water.  She exercises - has a [employer] ver[employer]ica, walks from [facility] to her home 45 minutes of walking wihtout any issues. She has been avoiding caffiene since then.  Had a small amount of caffiene earlier today   She states that her  recently purcahsed her an espresso machine at home.   No family history of sudden cardiac death.

## 2023-09-14 ENCOUNTER — APPOINTMENT (OUTPATIENT)
Dept: OBGYN | Facility: CLINIC | Age: 38
End: 2023-09-14
Payer: COMMERCIAL

## 2023-09-14 PROCEDURE — 76830 TRANSVAGINAL US NON-OB: CPT

## 2023-09-14 PROCEDURE — 58300 INSERT INTRAUTERINE DEVICE: CPT

## 2023-09-14 PROCEDURE — 81025 URINE PREGNANCY TEST: CPT

## 2023-09-28 ENCOUNTER — NON-APPOINTMENT (OUTPATIENT)
Age: 38
End: 2023-09-28

## 2023-10-19 ENCOUNTER — APPOINTMENT (OUTPATIENT)
Dept: OBGYN | Facility: CLINIC | Age: 38
End: 2023-10-19
Payer: COMMERCIAL

## 2023-10-19 PROCEDURE — 76830 TRANSVAGINAL US NON-OB: CPT

## 2023-10-19 PROCEDURE — 99213 OFFICE O/P EST LOW 20 MIN: CPT | Mod: 25

## 2023-10-19 PROCEDURE — 36415 COLL VENOUS BLD VENIPUNCTURE: CPT

## 2023-11-17 ENCOUNTER — OUTPATIENT (OUTPATIENT)
Dept: OUTPATIENT SERVICES | Facility: HOSPITAL | Age: 38
LOS: 1 days | End: 2023-11-17
Payer: COMMERCIAL

## 2023-11-17 ENCOUNTER — APPOINTMENT (OUTPATIENT)
Dept: MRI IMAGING | Facility: CLINIC | Age: 38
End: 2023-11-17
Payer: COMMERCIAL

## 2023-11-17 DIAGNOSIS — Z98.89 OTHER SPECIFIED POSTPROCEDURAL STATES: Chronic | ICD-10-CM

## 2023-11-17 DIAGNOSIS — K08.409 PARTIAL LOSS OF TEETH, UNSPECIFIED CAUSE, UNSPECIFIED CLASS: Chronic | ICD-10-CM

## 2023-11-17 DIAGNOSIS — Z98.890 OTHER SPECIFIED POSTPROCEDURAL STATES: Chronic | ICD-10-CM

## 2023-11-17 DIAGNOSIS — Z00.8 ENCOUNTER FOR OTHER GENERAL EXAMINATION: ICD-10-CM

## 2023-11-17 PROCEDURE — 77049 MRI BREAST C-+ W/CAD BI: CPT | Mod: 26

## 2023-11-17 PROCEDURE — C8937: CPT

## 2023-11-17 PROCEDURE — C8908: CPT

## 2023-11-17 PROCEDURE — A9585: CPT

## 2023-11-27 ENCOUNTER — APPOINTMENT (OUTPATIENT)
Dept: ULTRASOUND IMAGING | Facility: CLINIC | Age: 38
End: 2023-11-27
Payer: COMMERCIAL

## 2023-11-27 ENCOUNTER — APPOINTMENT (OUTPATIENT)
Dept: MAMMOGRAPHY | Facility: CLINIC | Age: 38
End: 2023-11-27
Payer: COMMERCIAL

## 2023-11-27 PROCEDURE — 77067 SCR MAMMO BI INCL CAD: CPT

## 2023-11-27 PROCEDURE — 76641 ULTRASOUND BREAST COMPLETE: CPT | Mod: 50

## 2023-11-27 PROCEDURE — 77063 BREAST TOMOSYNTHESIS BI: CPT

## 2023-11-27 NOTE — ASU PATIENT PROFILE, ADULT - NSSUBSTANCEUSE_GEN_ALL_CORE_SD
Caller: Luisa Padilla    Relationship: Self    Best call back number: [phone]     What form or medical record are you requesting: SURGICAL CLEARANCE    Who is requesting this form or medical record from you: SELF    How would you like to receive the form or medical records (pick-up, mail, fax):     Timeframe paperwork needed: ASAP    Additional notes: PATIENT IS UNSURE IF SHE NEEDED AN APPOINTMENT FOR DOCUMENTATION FOR HER UPCOMING KNEE SURGERY. SHE WOULD LIKE A CALL WHEN THE PAPERWORK IS READY TO BE PICKED UP    
Spoke to pt and she is waiting to see if the hand surg is going to clear her for knee surg first because her hand is still infected.  If they do clear her she will call me back and let me know if they will accept the one we did for her in Aug or if they need a new one.    Fax [phone]  
caffeine

## 2023-12-09 ENCOUNTER — EMERGENCY (EMERGENCY)
Facility: HOSPITAL | Age: 38
LOS: 1 days | Discharge: HOME | End: 2023-12-09
Attending: STUDENT IN AN ORGANIZED HEALTH CARE EDUCATION/TRAINING PROGRAM
Payer: COMMERCIAL

## 2023-12-09 VITALS
RESPIRATION RATE: 19 BRPM | SYSTOLIC BLOOD PRESSURE: 108 MMHG | HEART RATE: 72 BPM | DIASTOLIC BLOOD PRESSURE: 66 MMHG | TEMPERATURE: 98 F | OXYGEN SATURATION: 100 %

## 2023-12-09 VITALS
HEIGHT: 60 IN | DIASTOLIC BLOOD PRESSURE: 60 MMHG | SYSTOLIC BLOOD PRESSURE: 103 MMHG | HEART RATE: 75 BPM | TEMPERATURE: 98 F | OXYGEN SATURATION: 100 % | WEIGHT: 138.01 LBS | RESPIRATION RATE: 20 BRPM

## 2023-12-09 DIAGNOSIS — Z98.89 OTHER SPECIFIED POSTPROCEDURAL STATES: Chronic | ICD-10-CM

## 2023-12-09 DIAGNOSIS — Z98.890 OTHER SPECIFIED POSTPROCEDURAL STATES: Chronic | ICD-10-CM

## 2023-12-09 DIAGNOSIS — K08.409 PARTIAL LOSS OF TEETH, UNSPECIFIED CAUSE, UNSPECIFIED CLASS: Chronic | ICD-10-CM

## 2023-12-09 LAB
ALBUMIN SERPL ELPH-MCNC: 4.2 G/DL — SIGNIFICANT CHANGE UP (ref 3.3–5)
ALBUMIN SERPL ELPH-MCNC: 4.2 G/DL — SIGNIFICANT CHANGE UP (ref 3.3–5)
ALP SERPL-CCNC: 58 U/L — SIGNIFICANT CHANGE UP (ref 40–120)
ALP SERPL-CCNC: 58 U/L — SIGNIFICANT CHANGE UP (ref 40–120)
ALT FLD-CCNC: 15 U/L — SIGNIFICANT CHANGE UP (ref 10–45)
ALT FLD-CCNC: 15 U/L — SIGNIFICANT CHANGE UP (ref 10–45)
ANION GAP SERPL CALC-SCNC: 11 MMOL/L — SIGNIFICANT CHANGE UP (ref 5–17)
ANION GAP SERPL CALC-SCNC: 11 MMOL/L — SIGNIFICANT CHANGE UP (ref 5–17)
AST SERPL-CCNC: 20 U/L — SIGNIFICANT CHANGE UP (ref 10–40)
AST SERPL-CCNC: 20 U/L — SIGNIFICANT CHANGE UP (ref 10–40)
BASOPHILS # BLD AUTO: 0.06 K/UL — SIGNIFICANT CHANGE UP (ref 0–0.2)
BASOPHILS # BLD AUTO: 0.06 K/UL — SIGNIFICANT CHANGE UP (ref 0–0.2)
BASOPHILS NFR BLD AUTO: 0.6 % — SIGNIFICANT CHANGE UP (ref 0–2)
BASOPHILS NFR BLD AUTO: 0.6 % — SIGNIFICANT CHANGE UP (ref 0–2)
BILIRUB SERPL-MCNC: 0.4 MG/DL — SIGNIFICANT CHANGE UP (ref 0.2–1.2)
BILIRUB SERPL-MCNC: 0.4 MG/DL — SIGNIFICANT CHANGE UP (ref 0.2–1.2)
BUN SERPL-MCNC: 13 MG/DL — SIGNIFICANT CHANGE UP (ref 7–23)
BUN SERPL-MCNC: 13 MG/DL — SIGNIFICANT CHANGE UP (ref 7–23)
CALCIUM SERPL-MCNC: 9.1 MG/DL — SIGNIFICANT CHANGE UP (ref 8.4–10.5)
CALCIUM SERPL-MCNC: 9.1 MG/DL — SIGNIFICANT CHANGE UP (ref 8.4–10.5)
CHLORIDE SERPL-SCNC: 106 MMOL/L — SIGNIFICANT CHANGE UP (ref 96–108)
CHLORIDE SERPL-SCNC: 106 MMOL/L — SIGNIFICANT CHANGE UP (ref 96–108)
CO2 SERPL-SCNC: 22 MMOL/L — SIGNIFICANT CHANGE UP (ref 22–31)
CO2 SERPL-SCNC: 22 MMOL/L — SIGNIFICANT CHANGE UP (ref 22–31)
CREAT SERPL-MCNC: 0.63 MG/DL — SIGNIFICANT CHANGE UP (ref 0.5–1.3)
CREAT SERPL-MCNC: 0.63 MG/DL — SIGNIFICANT CHANGE UP (ref 0.5–1.3)
EGFR: 116 ML/MIN/1.73M2 — SIGNIFICANT CHANGE UP
EGFR: 116 ML/MIN/1.73M2 — SIGNIFICANT CHANGE UP
EOSINOPHIL # BLD AUTO: 0.03 K/UL — SIGNIFICANT CHANGE UP (ref 0–0.5)
EOSINOPHIL # BLD AUTO: 0.03 K/UL — SIGNIFICANT CHANGE UP (ref 0–0.5)
EOSINOPHIL NFR BLD AUTO: 0.3 % — SIGNIFICANT CHANGE UP (ref 0–6)
EOSINOPHIL NFR BLD AUTO: 0.3 % — SIGNIFICANT CHANGE UP (ref 0–6)
GLUCOSE SERPL-MCNC: 113 MG/DL — HIGH (ref 70–99)
GLUCOSE SERPL-MCNC: 113 MG/DL — HIGH (ref 70–99)
HCG SERPL-ACNC: <2 MIU/ML — SIGNIFICANT CHANGE UP
HCG SERPL-ACNC: <2 MIU/ML — SIGNIFICANT CHANGE UP
HCT VFR BLD CALC: 39.1 % — SIGNIFICANT CHANGE UP (ref 34.5–45)
HCT VFR BLD CALC: 39.1 % — SIGNIFICANT CHANGE UP (ref 34.5–45)
HGB BLD-MCNC: 13.5 G/DL — SIGNIFICANT CHANGE UP (ref 11.5–15.5)
HGB BLD-MCNC: 13.5 G/DL — SIGNIFICANT CHANGE UP (ref 11.5–15.5)
IMM GRANULOCYTES NFR BLD AUTO: 0.3 % — SIGNIFICANT CHANGE UP (ref 0–0.9)
IMM GRANULOCYTES NFR BLD AUTO: 0.3 % — SIGNIFICANT CHANGE UP (ref 0–0.9)
LIDOCAIN IGE QN: 19 U/L — SIGNIFICANT CHANGE UP (ref 7–60)
LIDOCAIN IGE QN: 19 U/L — SIGNIFICANT CHANGE UP (ref 7–60)
LYMPHOCYTES # BLD AUTO: 1.21 K/UL — SIGNIFICANT CHANGE UP (ref 1–3.3)
LYMPHOCYTES # BLD AUTO: 1.21 K/UL — SIGNIFICANT CHANGE UP (ref 1–3.3)
LYMPHOCYTES # BLD AUTO: 12.5 % — LOW (ref 13–44)
LYMPHOCYTES # BLD AUTO: 12.5 % — LOW (ref 13–44)
MAGNESIUM SERPL-MCNC: 1.8 MG/DL — SIGNIFICANT CHANGE UP (ref 1.6–2.6)
MAGNESIUM SERPL-MCNC: 1.8 MG/DL — SIGNIFICANT CHANGE UP (ref 1.6–2.6)
MCHC RBC-ENTMCNC: 33.2 PG — SIGNIFICANT CHANGE UP (ref 27–34)
MCHC RBC-ENTMCNC: 33.2 PG — SIGNIFICANT CHANGE UP (ref 27–34)
MCHC RBC-ENTMCNC: 34.5 GM/DL — SIGNIFICANT CHANGE UP (ref 32–36)
MCHC RBC-ENTMCNC: 34.5 GM/DL — SIGNIFICANT CHANGE UP (ref 32–36)
MCV RBC AUTO: 96.1 FL — SIGNIFICANT CHANGE UP (ref 80–100)
MCV RBC AUTO: 96.1 FL — SIGNIFICANT CHANGE UP (ref 80–100)
MONOCYTES # BLD AUTO: 0.4 K/UL — SIGNIFICANT CHANGE UP (ref 0–0.9)
MONOCYTES # BLD AUTO: 0.4 K/UL — SIGNIFICANT CHANGE UP (ref 0–0.9)
MONOCYTES NFR BLD AUTO: 4.1 % — SIGNIFICANT CHANGE UP (ref 2–14)
MONOCYTES NFR BLD AUTO: 4.1 % — SIGNIFICANT CHANGE UP (ref 2–14)
NEUTROPHILS # BLD AUTO: 7.96 K/UL — HIGH (ref 1.8–7.4)
NEUTROPHILS # BLD AUTO: 7.96 K/UL — HIGH (ref 1.8–7.4)
NEUTROPHILS NFR BLD AUTO: 82.2 % — HIGH (ref 43–77)
NEUTROPHILS NFR BLD AUTO: 82.2 % — HIGH (ref 43–77)
NRBC # BLD: 0 /100 WBCS — SIGNIFICANT CHANGE UP (ref 0–0)
NRBC # BLD: 0 /100 WBCS — SIGNIFICANT CHANGE UP (ref 0–0)
PLATELET # BLD AUTO: 218 K/UL — SIGNIFICANT CHANGE UP (ref 150–400)
PLATELET # BLD AUTO: 218 K/UL — SIGNIFICANT CHANGE UP (ref 150–400)
POTASSIUM SERPL-MCNC: 4.3 MMOL/L — SIGNIFICANT CHANGE UP (ref 3.5–5.3)
POTASSIUM SERPL-MCNC: 4.3 MMOL/L — SIGNIFICANT CHANGE UP (ref 3.5–5.3)
POTASSIUM SERPL-SCNC: 4.3 MMOL/L — SIGNIFICANT CHANGE UP (ref 3.5–5.3)
POTASSIUM SERPL-SCNC: 4.3 MMOL/L — SIGNIFICANT CHANGE UP (ref 3.5–5.3)
PROT SERPL-MCNC: 7.1 G/DL — SIGNIFICANT CHANGE UP (ref 6–8.3)
PROT SERPL-MCNC: 7.1 G/DL — SIGNIFICANT CHANGE UP (ref 6–8.3)
RBC # BLD: 4.07 M/UL — SIGNIFICANT CHANGE UP (ref 3.8–5.2)
RBC # BLD: 4.07 M/UL — SIGNIFICANT CHANGE UP (ref 3.8–5.2)
RBC # FLD: 11.9 % — SIGNIFICANT CHANGE UP (ref 10.3–14.5)
RBC # FLD: 11.9 % — SIGNIFICANT CHANGE UP (ref 10.3–14.5)
SODIUM SERPL-SCNC: 139 MMOL/L — SIGNIFICANT CHANGE UP (ref 135–145)
SODIUM SERPL-SCNC: 139 MMOL/L — SIGNIFICANT CHANGE UP (ref 135–145)
WBC # BLD: 9.69 K/UL — SIGNIFICANT CHANGE UP (ref 3.8–10.5)
WBC # BLD: 9.69 K/UL — SIGNIFICANT CHANGE UP (ref 3.8–10.5)
WBC # FLD AUTO: 9.69 K/UL — SIGNIFICANT CHANGE UP (ref 3.8–10.5)
WBC # FLD AUTO: 9.69 K/UL — SIGNIFICANT CHANGE UP (ref 3.8–10.5)

## 2023-12-09 PROCEDURE — G1004: CPT

## 2023-12-09 PROCEDURE — 84702 CHORIONIC GONADOTROPIN TEST: CPT

## 2023-12-09 PROCEDURE — 12001 RPR S/N/AX/GEN/TRNK 2.5CM/<: CPT

## 2023-12-09 PROCEDURE — 70450 CT HEAD/BRAIN W/O DYE: CPT | Mod: MG

## 2023-12-09 PROCEDURE — 80053 COMPREHEN METABOLIC PANEL: CPT

## 2023-12-09 PROCEDURE — 99284 EMERGENCY DEPT VISIT MOD MDM: CPT | Mod: 25

## 2023-12-09 PROCEDURE — 83690 ASSAY OF LIPASE: CPT

## 2023-12-09 PROCEDURE — 70450 CT HEAD/BRAIN W/O DYE: CPT | Mod: 26,MG

## 2023-12-09 PROCEDURE — 96375 TX/PRO/DX INJ NEW DRUG ADDON: CPT | Mod: XU

## 2023-12-09 PROCEDURE — 85025 COMPLETE CBC W/AUTO DIFF WBC: CPT

## 2023-12-09 PROCEDURE — 36415 COLL VENOUS BLD VENIPUNCTURE: CPT

## 2023-12-09 PROCEDURE — 83735 ASSAY OF MAGNESIUM: CPT

## 2023-12-09 PROCEDURE — 96374 THER/PROPH/DIAG INJ IV PUSH: CPT | Mod: XU

## 2023-12-09 RX ORDER — TETANUS TOXOID, REDUCED DIPHTHERIA TOXOID AND ACELLULAR PERTUSSIS VACCINE, ADSORBED 5; 2.5; 8; 8; 2.5 [IU]/.5ML; [IU]/.5ML; UG/.5ML; UG/.5ML; UG/.5ML
0.5 SUSPENSION INTRAMUSCULAR ONCE
Refills: 0 | Status: DISCONTINUED | OUTPATIENT
Start: 2023-12-09 | End: 2023-12-09

## 2023-12-09 RX ORDER — ONDANSETRON 8 MG/1
4 TABLET, FILM COATED ORAL ONCE
Refills: 0 | Status: COMPLETED | OUTPATIENT
Start: 2023-12-09 | End: 2023-12-09

## 2023-12-09 RX ORDER — ONDANSETRON 8 MG/1
1 TABLET, FILM COATED ORAL
Qty: 9 | Refills: 0
Start: 2023-12-09 | End: 2023-12-11

## 2023-12-09 RX ORDER — ACETAMINOPHEN 500 MG
1000 TABLET ORAL ONCE
Refills: 0 | Status: COMPLETED | OUTPATIENT
Start: 2023-12-09 | End: 2023-12-09

## 2023-12-09 RX ORDER — SODIUM CHLORIDE 9 MG/ML
1000 INJECTION INTRAMUSCULAR; INTRAVENOUS; SUBCUTANEOUS ONCE
Refills: 0 | Status: COMPLETED | OUTPATIENT
Start: 2023-12-09 | End: 2023-12-09

## 2023-12-09 RX ADMIN — ONDANSETRON 4 MILLIGRAM(S): 8 TABLET, FILM COATED ORAL at 08:55

## 2023-12-09 RX ADMIN — Medication 400 MILLIGRAM(S): at 08:55

## 2023-12-09 RX ADMIN — SODIUM CHLORIDE 1000 MILLILITER(S): 9 INJECTION INTRAMUSCULAR; INTRAVENOUS; SUBCUTANEOUS at 08:56

## 2023-12-09 NOTE — ED PROVIDER NOTE - ATTENDING APP SHARED VISIT CONTRIBUTION OF CARE
Attending MD VERONICA Hewitt- This was a shared visit with ERIKA.  I have reviewed and discussed the case with the ERIKA and agree with verified documentation unless otherwise documented.  I have independently spoken with and examined the patient and my documentation of history/physical exam and MDM are as follows:    38 F with PMH meningioma s/p resection presenting to ED with vomiting, syncope, head injury.  Patient had been in usual state of health until this morning when she woke up with stomach discomfort and nausea, followed by several episodes of NB diarrhea and NBNB emesis.  After severe vomiting woke up on bathroom floor having sustain head strike and presumed LOC.  Patient states she often loses consciousness with severe vomiting.  Endorsing some pain to back of scalp and continued nausea.  Suspects GI symptoms attributed to food she ate at restaurant yesterday evening.  No fever, chest pain, shortness of breath, cough, dysuria, sick contacts or recent travel.  Tdap up-to-date.  On exam, patient in no acute distress, 1.5 cm vertical laceration to posterior scalp without active bleeding or underlying hematoma.  Otherwise no evidence of trauma to face, C/T/L spine, trunk, extremities.  Abdomen soft nontender, mucous membranes slightly dry.    MDM–38 F with history of meningiomas s/p resection presenting to ED with GI symptoms, syncope, head strike with scalp laceration, Tdap up-to-date.  Will administer fluids and medications for symptomatic treatment, obtain CT head to evaluate for intracranial injury particularly given neurosurgical history, repair laceration (see separate procedure note) and obtain blood work to evaluate for hepatobiliary pathology and electrolyte derangement in setting of vomiting and diarrhea.    No leukocytosis or anemia, no thrombocytopenia.  No electrolyte abnormalities or evidence of REGGIE.  Hepatic panel within normal limits.  Lipase negative.  Pregnancy negative.  CT head without acute intracranial pathology.    Patient endorsing significant symptomatic improvement.  Discussed all results and return precautions including signs and symptoms of wound infection and instructions to return for staple removal.  Patient states she has upcoming routine surveillance MRI brain; confirmed with MRI suite that staples are MRI compatible.  Patient stable for discharge.

## 2023-12-09 NOTE — ED PROVIDER NOTE - OBJECTIVE STATEMENT
38-year-old female with past medical history of meningioma status post resection presents the emergency department complaining of syncope with head injury.  Patient reports that when she woke up this morning she had an upset stomach and nausea.  She reports that she proceeded to have 4 episodes of diarrhea followed by multiple episodes of vomiting.  Patient states that the time that she has had severe vomiting in the past she has syncopized and reports she remembers leaning into her bathroom sink to vomit and then waking up on the bathroom floor.  Since patient reports that she has had improvement of abdominal discomfort.  At this time patient complaining of headache and nausea. She believes her symptoms are related to food she had while eating out at a restaurant last night.  She denies sick contacts, fevers, anticoagulant use, dizziness, vision change, numbness, weakness, chest pain, shortness of breath.  Patient tetanus is up-to-date.

## 2023-12-09 NOTE — ED PROVIDER NOTE - NSICDXPASTSURGICALHX_GEN_ALL_CORE_FT
PAST SURGICAL HISTORY:  H/O inguinal hernia repair 2019    H/O:      S/P craniotomy 2019  revision of monisha 10/19    [place] teeth extracted 2014     PAST SURGICAL HISTORY:  H/O inguinal hernia repair 2019    H/O:      S/P craniotomy 2019  revision of monisha 10/19    [place] teeth extracted 2014

## 2023-12-09 NOTE — ED PROVIDER NOTE - NSFOLLOWUPINSTRUCTIONS_ED_ALL_ED_FT
1. Please follow up with your Primary Care Doctor after discharge, bring a copy of your results to follow up appointment for review     2. Please rest, stay hydrated and continue all at home medications as previously prescribed    3. For nausea take Zofran 4mg every 8 hours as needed     4. Keep staples clean and dry for 24 hours, you may then clean gently with soap and water. Please return to the ED in 1 week for staple removal     5. If you have persistent headaches, dizziness, trouble focusing, light/sound sensitivity you may follow up with our concussion program for reevaluation and continued management. Please call [phone] to schedule an appointment     5. Return to ED for any new or worsened symptoms of concern 1. Please follow up with your Primary Care Doctor after discharge, bring a copy of your results to follow up appointment for review     2. Please rest, stay hydrated and continue all at home medications as previously prescribed    3. For nausea take Zofran 4mg every 8 hours as needed     4. Keep staples clean and dry for 24 hours, you may then clean gently with soap and water. Please return to the ED in 1 week for staple removal     5. If you have persistent headaches, dizziness, trouble focusing, light/sound sensitivity you may follow up with our concussion program for reevaluation and continued management. Please call [phone] to schedule an appointment     5. Return to ED for any new or worsened symptoms of concern

## 2023-12-09 NOTE — ED ADULT NURSE NOTE - NSFALLUNIVINTERV_ED_ALL_ED
Bed/Stretcher in lowest position, wheels locked, appropriate side rails in place/Call bell, personal items and telephone in reach/Instruct patient to call for assistance before getting out of bed/chair/stretcher/Non-slip footwear applied when patient is off stretcher/[place] to call system/Physically safe environment - no spills, clutter or unnecessary equipment/Purposeful proactive rounding/Room/bathroom lighting operational, light cord in reach Bed/Stretcher in lowest position, wheels locked, appropriate side rails in place/Call bell, personal items and telephone in reach/Instruct patient to call for assistance before getting out of bed/chair/stretcher/Non-slip footwear applied when patient is off stretcher/[place] to call system/Physically safe environment - no spills, clutter or unnecessary equipment/Purposeful proactive rounding/Room/bathroom lighting operational, light cord in reach

## 2023-12-09 NOTE — ED PROVIDER NOTE - PATIENT PORTAL LINK FT
You can access the FollowMyHealth Patient Portal offered by [facility] by registering at the following website: [email or web address]. By joining [employer]’s FollowMyHealth portal, you will also be able to view your health information using other applications (apps) compatible with our system. You can access the FollowMyHealth Patient Portal offered by [facility] by registering at the following website: [email or web address]. By joining [employer]’s FollowMyHealth portal, you will also be able to view your health information using other applications (apps) compatible with our system.

## 2023-12-09 NOTE — ED ADULT NURSE NOTE - NSICDXPASTSURGICALHX_GEN_ALL_CORE_FT
PAST SURGICAL HISTORY:  H/O inguinal hernia repair 2019    H/O:      S/P craniotomy 2019  revision of monisha 10/19    [place] teeth extracted 2014     PAST SURGICAL HISTORY:  H/O inguinal hernia repair 2019    H/O:      S/P craniotomy 2019  revision of monisha 10/19    [place] teeth extracted 2014

## 2023-12-09 NOTE — ED ADULT NURSE NOTE - OBJECTIVE STATEMENT
PT is a 38 yr old female coming from home for syncope. Pt states she has a hx of syncope after being nauseous/throwing up. Pt woke up this morning with nausea, vomiting, and diarrhea x 4. PT had one syncopal episode immediately after- woke up on the tile floor of her bathroom with bleeding from her head. PT called ems and walked down to meet them. Pt had another episode of nausea, vomiting, and syncope while in the ambulance. PT has a 1 inch lac to the back of her head- complains of nausea and headache. Pt is a/ox 3- vitals stable.

## 2023-12-09 NOTE — ED PROCEDURE NOTE - ATTENDING APP SHARED VISIT CONTRIBUTION OF CARE
Attending MD VERONICA Reyes I was present for the critical and key portions of the procedure and I was immediately available to provide assistance. I agree with the documentation unless otherwise noted below.

## 2023-12-09 NOTE — ED PROVIDER NOTE - PROGRESS NOTE DETAILS
Pt CT without acute concerning findings. Labs without concerning abnormalities. Patient feeling improved after meds and fluids. She was able to tolerate PO. Will d/c home

## 2023-12-09 NOTE — ED PROVIDER NOTE - PHYSICAL EXAMINATION
CONSTITUTIONAL: Patient is awake, alert and oriented x 3. Patient is well appearing and in no acute distress  HEAD: Small 1.5 cm vertically oriented laceration to the occipital scalp otherwise NCAT  EYES: PERRL b/l, EOMI  NECK: supple, FROM  LUNGS: CTA b/l, no wheezing or rales   HEART: RRR.+S1S2 no murmurs  ABDOMEN: Soft, non-distended, nttp,  no rebound or guarding  EXTREMITY: no edema or calf tenderness b/l, FROM upper and lower ext b/l  SKIN: with no rash or lesions  NEURO: Cn3-12 grossly intact. Strength 5/5 UE/LE b/l. Normal gross sensation UE/LE b/l

## 2023-12-09 NOTE — ED PROCEDURE NOTE - CPROC ED TIME OUT STATEMENT1
“Patient's name, , procedure and correct site were confirmed during the [place] Timeout.” “Patient's name, , procedure and correct site were confirmed during the [place] Timeout.”

## 2023-12-13 ENCOUNTER — OUTPATIENT (OUTPATIENT)
Dept: OUTPATIENT SERVICES | Facility: HOSPITAL | Age: 38
LOS: 1 days | End: 2023-12-13
Payer: COMMERCIAL

## 2023-12-13 ENCOUNTER — APPOINTMENT (OUTPATIENT)
Dept: NEUROSURGERY | Facility: CLINIC | Age: 38
End: 2023-12-13
Payer: COMMERCIAL

## 2023-12-13 ENCOUNTER — APPOINTMENT (OUTPATIENT)
Dept: MRI IMAGING | Facility: IMAGING CENTER | Age: 38
End: 2023-12-13
Payer: COMMERCIAL

## 2023-12-13 VITALS
DIASTOLIC BLOOD PRESSURE: 69 MMHG | RESPIRATION RATE: 16 BRPM | HEIGHT: 60 IN | HEART RATE: 69 BPM | SYSTOLIC BLOOD PRESSURE: 115 MMHG | BODY MASS INDEX: 26.9 KG/M2 | OXYGEN SATURATION: 99 % | WEIGHT: 137 LBS | TEMPERATURE: 99.1 F

## 2023-12-13 DIAGNOSIS — Z00.8 ENCOUNTER FOR OTHER GENERAL EXAMINATION: ICD-10-CM

## 2023-12-13 DIAGNOSIS — Z98.890 OTHER SPECIFIED POSTPROCEDURAL STATES: Chronic | ICD-10-CM

## 2023-12-13 DIAGNOSIS — K08.409 PARTIAL LOSS OF TEETH, UNSPECIFIED CAUSE, UNSPECIFIED CLASS: Chronic | ICD-10-CM

## 2023-12-13 DIAGNOSIS — D32.9 BENIGN NEOPLASM OF MENINGES, UNSPECIFIED: ICD-10-CM

## 2023-12-13 DIAGNOSIS — Z98.89 OTHER SPECIFIED POSTPROCEDURAL STATES: Chronic | ICD-10-CM

## 2023-12-13 PROCEDURE — 70551 MRI BRAIN STEM W/O DYE: CPT

## 2023-12-13 PROCEDURE — 99213 OFFICE O/P EST LOW 20 MIN: CPT

## 2023-12-13 PROCEDURE — 70551 MRI BRAIN STEM W/O DYE: CPT | Mod: 26

## 2023-12-13 NOTE — ASSESSMENT
[FreeTextEntry1] : Plan:  MRI- stable MRI w/o contrast in 18 months 6/2025  .IThierry evaluated the patient with the nurse practitioner Joann Barthelemy and established the plan of care. I personally discuss this patient with the nurse practitioner at the time of the visit. I agree with the assessment and plan as written, unless noted below.

## 2023-12-13 NOTE — PHYSICAL EXAM
[General Appearance - Alert] : alert [General Appearance - In No Acute Distress] : in no acute distress [General Appearance - Well Nourished] : well nourished [Staple Intact] : closed with intact staples [Oriented To Time, Place, And Person] : oriented to person, place, and time [Impaired Insight] : insight and judgment were intact [Affect] : the affect was normal [Person] : oriented to person [Place] : oriented to place [Time] : oriented to time [Short Term Intact] : short term memory intact [Remote Intact] : remote memory intact [Registration Intact] : recent registration memory intact [Motor Strength] : muscle strength was normal in all four extremities [Involuntary Movements] : no involuntary movements were seen [Romberg's Sign] : a positive Romberg's sign was present [PERRL With Normal Accommodation] : pupils were equal in size, round, reactive to light, with normal accommodation [Extraocular Movements] : extraocular movements were intact [Neck Appearance] : the appearance of the neck was normal [] : no respiratory distress [Abnormal Walk] : normal gait [Motor Tone] : muscle strength and tone were normal [Skin Color & Pigmentation] : normal skin color and pigmentation

## 2023-12-13 NOTE — REASON FOR VISIT
[Follow-Up: _____] : a [unfilled] follow-up visit [FreeTextEntry1] : Mrs. 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 Returns today w/ imaging for scheduled f/u.  On 12/9 she believes she had food poisoning and vomited (pt has known history of vasovagal when vomiting) she was unable to lay down as the need to vomit was so abrupt which resulted in fall with posterior head strike in bathroom. She was taken to [facility], head Ct performed and was negative 3 staples were placed.   She reports feeling well. Working and caring for her two children without difficulty.  Neuro Exam intact.  Staples to posterior head intact no redness, drainage or erythema

## 2023-12-22 NOTE — H&P PST ADULT - NS PRO AD NO ADVANCE DIRECTIVE
12/22/2023   [facility] - Outpatient Clinics  N/A  For additional resource needs, please contact your health insurance member services or your primary care team.  Phone: [phone]   Email: N/A   Address: [facility] [place] [address]   Hours: N/A        Financial Stability       Utility payment assistance  1  [place] [facility] - Energy and Utilities Distance: 37.74 miles      In-Person, Phone/Virtual   [address] [place] [address]  Language: English  Hours: Mon - Fri 8:30 AM - 4:30 PM  Fees: Free   Phone: (765) 829-7847 Website: [place]://[place]/Tune Clout/energy/consumer-assistance/energy-assistance-program/     2  [place] Public [employer] [facility] [place]'s Telephone Assistance Plan (TAP) and [facility] Lifeline and Affordable Connectivity Program (ACP) Distance: 40.21 miles      Phone/Virtual   [address] [place] [address]  Language: English  Fees: Free   Phone: (547) 666-3997 Email: [email or web address] Website: https://[place]/puc/consumers/telephone/          Important Numbers & Websites       [facility]   211 211unitedway.org  Poison Control   (923) 986-4722 [place]  Suicide and Crisis Lifeline   [address] [place], [place] [address]line.org  Childhelp [place] Child Abuse Hotline   404.518.3007 Childhelphotline.org  National Sexual Assault Hotline   (116) 244-5807 (HOPE) Rainn.org  National Runaway Safeline   (144) 843-3601 (RUNAWAY) 1800runaway.org  Pregnancy & Postpartum Support [place]   Call/text 242-299-3783 Ppsupport[place].org  Substance Abuse National Helpline ([facility]   079-373-HELP (9883) Findtreatment.gov  Emergency Services   911     No

## 2024-01-03 ENCOUNTER — NON-APPOINTMENT (OUTPATIENT)
Age: 39
End: 2024-01-03

## 2024-01-29 ENCOUNTER — NON-APPOINTMENT (OUTPATIENT)
Age: 39
End: 2024-01-29

## 2024-01-30 ENCOUNTER — EMERGENCY (EMERGENCY)
Facility: HOSPITAL | Age: 39
LOS: 1 days | Discharge: HOME | End: 2024-01-30
Attending: STUDENT IN AN ORGANIZED HEALTH CARE EDUCATION/TRAINING PROGRAM
Payer: COMMERCIAL

## 2024-01-30 VITALS
DIASTOLIC BLOOD PRESSURE: 59 MMHG | SYSTOLIC BLOOD PRESSURE: 115 MMHG | OXYGEN SATURATION: 99 % | RESPIRATION RATE: 20 BRPM | HEART RATE: 77 BPM | WEIGHT: 138.01 LBS | HEIGHT: 60 IN | TEMPERATURE: 98 F

## 2024-01-30 DIAGNOSIS — Z98.89 OTHER SPECIFIED POSTPROCEDURAL STATES: Chronic | ICD-10-CM

## 2024-01-30 DIAGNOSIS — Z98.890 OTHER SPECIFIED POSTPROCEDURAL STATES: Chronic | ICD-10-CM

## 2024-01-30 DIAGNOSIS — K08.409 PARTIAL LOSS OF TEETH, UNSPECIFIED CAUSE, UNSPECIFIED CLASS: Chronic | ICD-10-CM

## 2024-01-30 LAB
BASE EXCESS BLDV CALC-SCNC: 1.4 MMOL/L — SIGNIFICANT CHANGE UP (ref -2–3)
BASOPHILS # BLD AUTO: 0.05 K/UL — SIGNIFICANT CHANGE UP (ref 0–0.2)
BASOPHILS NFR BLD AUTO: 0.5 % — SIGNIFICANT CHANGE UP (ref 0–2)
CA-I SERPL-SCNC: 1.26 MMOL/L — SIGNIFICANT CHANGE UP (ref 1.15–1.33)
CHLORIDE BLDV-SCNC: 102 MMOL/L — SIGNIFICANT CHANGE UP (ref 96–108)
CO2 BLDV-SCNC: 29 MMOL/L — HIGH (ref 22–26)
EOSINOPHIL # BLD AUTO: 0.07 K/UL — SIGNIFICANT CHANGE UP (ref 0–0.5)
EOSINOPHIL NFR BLD AUTO: 0.8 % — SIGNIFICANT CHANGE UP (ref 0–6)
GAS PNL BLDV: 134 MMOL/L — LOW (ref 136–145)
GAS PNL BLDV: SIGNIFICANT CHANGE UP
GAS PNL BLDV: SIGNIFICANT CHANGE UP
GLUCOSE BLDV-MCNC: 132 MG/DL — HIGH (ref 70–99)
HCO3 BLDV-SCNC: 28 MMOL/L — SIGNIFICANT CHANGE UP (ref 22–29)
HCT VFR BLD CALC: 39.5 % — SIGNIFICANT CHANGE UP (ref 34.5–45)
HCT VFR BLDA CALC: 42 % — SIGNIFICANT CHANGE UP (ref 34.5–46.5)
HGB BLD CALC-MCNC: 13.9 G/DL — SIGNIFICANT CHANGE UP (ref 11.7–16.1)
HGB BLD-MCNC: 13.6 G/DL — SIGNIFICANT CHANGE UP (ref 11.5–15.5)
HOROWITZ INDEX BLDV+IHG-RTO: SIGNIFICANT CHANGE UP
IMM GRANULOCYTES NFR BLD AUTO: 0.2 % — SIGNIFICANT CHANGE UP (ref 0–0.9)
LACTATE BLDV-MCNC: 1.5 MMOL/L — SIGNIFICANT CHANGE UP (ref 0.5–2)
LYMPHOCYTES # BLD AUTO: 2.34 K/UL — SIGNIFICANT CHANGE UP (ref 1–3.3)
LYMPHOCYTES # BLD AUTO: 25.7 % — SIGNIFICANT CHANGE UP (ref 13–44)
MCHC RBC-ENTMCNC: 33.3 PG — SIGNIFICANT CHANGE UP (ref 27–34)
MCHC RBC-ENTMCNC: 34.4 GM/DL — SIGNIFICANT CHANGE UP (ref 32–36)
MCV RBC AUTO: 96.6 FL — SIGNIFICANT CHANGE UP (ref 80–100)
MONOCYTES # BLD AUTO: 0.64 K/UL — SIGNIFICANT CHANGE UP (ref 0–0.9)
MONOCYTES NFR BLD AUTO: 7 % — SIGNIFICANT CHANGE UP (ref 2–14)
NEUTROPHILS # BLD AUTO: 5.99 K/UL — SIGNIFICANT CHANGE UP (ref 1.8–7.4)
NEUTROPHILS NFR BLD AUTO: 65.8 % — SIGNIFICANT CHANGE UP (ref 43–77)
NRBC # BLD: 0 /100 WBCS — SIGNIFICANT CHANGE UP (ref 0–0)
PCO2 BLDV: 49 MMHG — HIGH (ref 39–42)
PH BLDV: 7.36 — SIGNIFICANT CHANGE UP (ref 7.32–7.43)
PLATELET # BLD AUTO: 260 K/UL — SIGNIFICANT CHANGE UP (ref 150–400)
PO2 BLDV: 32 MMHG — SIGNIFICANT CHANGE UP (ref 25–45)
POTASSIUM BLDV-SCNC: 4 MMOL/L — SIGNIFICANT CHANGE UP (ref 3.5–5.1)
RBC # BLD: 4.09 M/UL — SIGNIFICANT CHANGE UP (ref 3.8–5.2)
RBC # FLD: 11.6 % — SIGNIFICANT CHANGE UP (ref 10.3–14.5)
SAO2 % BLDV: 49.2 % — LOW (ref 67–88)
WBC # BLD: 9.11 K/UL — SIGNIFICANT CHANGE UP (ref 3.8–10.5)
WBC # FLD AUTO: 9.11 K/UL — SIGNIFICANT CHANGE UP (ref 3.8–10.5)

## 2024-01-30 PROCEDURE — 82803 BLOOD GASES ANY COMBINATION: CPT

## 2024-01-30 PROCEDURE — 84132 ASSAY OF SERUM POTASSIUM: CPT

## 2024-01-30 PROCEDURE — 99284 EMERGENCY DEPT VISIT MOD MDM: CPT | Mod: 25

## 2024-01-30 PROCEDURE — 80053 COMPREHEN METABOLIC PANEL: CPT

## 2024-01-30 PROCEDURE — 85025 COMPLETE CBC W/AUTO DIFF WBC: CPT

## 2024-01-30 PROCEDURE — 85018 HEMOGLOBIN: CPT

## 2024-01-30 PROCEDURE — 70450 CT HEAD/BRAIN W/O DYE: CPT | Mod: MA

## 2024-01-30 PROCEDURE — 99284 EMERGENCY DEPT VISIT MOD MDM: CPT

## 2024-01-30 PROCEDURE — 82330 ASSAY OF CALCIUM: CPT

## 2024-01-30 PROCEDURE — 82962 GLUCOSE BLOOD TEST: CPT

## 2024-01-30 PROCEDURE — 82435 ASSAY OF BLOOD CHLORIDE: CPT

## 2024-01-30 PROCEDURE — 85014 HEMATOCRIT: CPT

## 2024-01-30 PROCEDURE — 82947 ASSAY GLUCOSE BLOOD QUANT: CPT

## 2024-01-30 PROCEDURE — 72125 CT NECK SPINE W/O DYE: CPT | Mod: MA

## 2024-01-30 PROCEDURE — 83605 ASSAY OF LACTIC ACID: CPT

## 2024-01-30 PROCEDURE — 84295 ASSAY OF SERUM SODIUM: CPT

## 2024-01-30 PROCEDURE — 36415 COLL VENOUS BLD VENIPUNCTURE: CPT

## 2024-01-30 NOTE — ED ADULT TRIAGE NOTE - WEIGHT IN KG
Patient called stating she has had a cough for about 3 and a half weeks and would like to know if she can still come to her appointment tomorrow 8.19.21. Please call patient back when able to discuss.    62.6

## 2024-01-30 NOTE — ED PROVIDER NOTE - NSFOLLOWUPCLINICS_GEN_ALL_ED_FT
[facility]  Neurology  [address] [place] [address] [address] [place] [address]  Phone: [phone]  Fax:   Follow Up Time: 1-3 Days

## 2024-01-30 NOTE — ED PROVIDER NOTE - CCCP TRG CHIEF CMPLNT
Problem: Skin Integrity  Goal: Risk for impaired skin integrity will decrease    Intervention: Assess risk factors for impaired skin integrity and/or pressure ulcers  Patient repositioned, mepilex utilized, moisture barriers utilized.          numbness

## 2024-01-30 NOTE — ED PROVIDER NOTE - PATIENT PORTAL LINK FT
You can access the FollowMyHealth Patient Portal offered by [facility] by registering at the following website: [email or web address]. By joining [employer]’s FollowMyHealth portal, you will also be able to view your health information using other applications (apps) compatible with our system.

## 2024-01-30 NOTE — ED PROVIDER NOTE - ATTENDING APP SHARED VISIT CONTRIBUTION OF CARE
I, Sterling Flores, performed a history and physical exam of the patient and discussed their management with the resident and/or advanced care provider. I reviewed the resident and/or advanced care provider's note and agree with the documented findings and plan of care. I was present and available for all procedures.    38-year-old female with past medical history of meningioma status post resection, presents to the ER for numbness. states this evening she was driving to  her son and while she was filling her car up with gas she developed right cheek numbness and tongue stiffness. states lasted for about two minutes and then resolved. states went to urgent care and was advised to go to the ER. states she is back to her baseline at this time. denies f/n/v/d, CP, SOB, HA, dizziness, abdominal pain, urinary symptoms, cough, neck pain.    Well appearing and in NAD, head normal appearing atraumatic, trachea midline, no respiratory distress, lungs cta bilaterally, rrr no murmurs, soft NT ND abdomen, no visible extremity deformities, Alert and oriented, non focal neuro exam, skin warm and dry, normal affect and mood, no leg swelling, calf ttp or jvd    Patient presenting with sudden onset neurologic symptoms that have since subsided after less than 5-minute duration otherwise has not reoccurred her exam is normal vital signs are normal will obtain screening labs and CT head for screening purposes since patient has surgical history otherwise anticipate discharge home unlikely CVA ICH or dural sinus thrombosis with discharge with neuro follow-up and return precautions discussed patient agreed with plan unlikely ACS PE pneumothorax dissection AAA pneumonia

## 2024-01-30 NOTE — ED PROVIDER NOTE - OBJECTIVE STATEMENT
38-year-old female with past medical history of meningioma status post resection, presents to the ER for numbness. states this evening she was driving to  her son and while she was filling her car up with gas she developed right cheek numbness and tongue stiffness. states lasted for about two minutes and then resolved. states went to urgent care and was advised to go to the ER. states she is back to her baseline at this time. denies f/n/v/d, CP, SOB, HA, dizziness, abdominal pain, urinary symptoms, cough, neck pain.

## 2024-01-30 NOTE — ED PROVIDER NOTE - PROGRESS NOTE DETAILS
Shamika Erickson PA-C: labs and imaging reviewed, no acute pathology noted. patient well appearing. advised to follow up with neurology. stable for discharge. discussed with Dr. Flores.

## 2024-01-30 NOTE — ED PROVIDER NOTE - NSFOLLOWUPINSTRUCTIONS_ED_ALL_ED_FT
1. It is important to follow up with your primary care doctor in 1-2 days    follow up with neurology in 1-3 days     2. bring a copy of all your results to your follow up appointments    3. stay hydrated.    4. if your symptoms worsen, persist, or if any new symptoms develop, or if you experience any signs of distress, return to the ER right away.

## 2024-01-31 VITALS
SYSTOLIC BLOOD PRESSURE: 103 MMHG | HEART RATE: 60 BPM | DIASTOLIC BLOOD PRESSURE: 71 MMHG | OXYGEN SATURATION: 99 % | TEMPERATURE: 98 F | RESPIRATION RATE: 18 BRPM

## 2024-01-31 LAB
ALBUMIN SERPL ELPH-MCNC: 4.5 G/DL — SIGNIFICANT CHANGE UP (ref 3.3–5)
ALP SERPL-CCNC: 61 U/L — SIGNIFICANT CHANGE UP (ref 40–120)
ALT FLD-CCNC: 13 U/L — SIGNIFICANT CHANGE UP (ref 10–45)
ANION GAP SERPL CALC-SCNC: 11 MMOL/L — SIGNIFICANT CHANGE UP (ref 5–17)
AST SERPL-CCNC: 15 U/L — SIGNIFICANT CHANGE UP (ref 10–40)
BILIRUB SERPL-MCNC: 0.8 MG/DL — SIGNIFICANT CHANGE UP (ref 0.2–1.2)
BUN SERPL-MCNC: 11 MG/DL — SIGNIFICANT CHANGE UP (ref 7–23)
CALCIUM SERPL-MCNC: 9.3 MG/DL — SIGNIFICANT CHANGE UP (ref 8.4–10.5)
CHLORIDE SERPL-SCNC: 101 MMOL/L — SIGNIFICANT CHANGE UP (ref 96–108)
CO2 SERPL-SCNC: 24 MMOL/L — SIGNIFICANT CHANGE UP (ref 22–31)
CREAT SERPL-MCNC: 0.64 MG/DL — SIGNIFICANT CHANGE UP (ref 0.5–1.3)
EGFR: 115 ML/MIN/1.73M2 — SIGNIFICANT CHANGE UP
GLUCOSE SERPL-MCNC: 129 MG/DL — HIGH (ref 70–99)
POTASSIUM SERPL-MCNC: 3.9 MMOL/L — SIGNIFICANT CHANGE UP (ref 3.5–5.3)
POTASSIUM SERPL-SCNC: 3.9 MMOL/L — SIGNIFICANT CHANGE UP (ref 3.5–5.3)
PROT SERPL-MCNC: 7.5 G/DL — SIGNIFICANT CHANGE UP (ref 6–8.3)
SODIUM SERPL-SCNC: 136 MMOL/L — SIGNIFICANT CHANGE UP (ref 135–145)

## 2024-01-31 PROCEDURE — 70450 CT HEAD/BRAIN W/O DYE: CPT | Mod: 26,MA

## 2024-01-31 PROCEDURE — 72125 CT NECK SPINE W/O DYE: CPT | Mod: 26,MA

## 2024-01-31 NOTE — ED ADULT NURSE NOTE - NSICDXPASTSURGICALHX_GEN_ALL_CORE_FT
PAST SURGICAL HISTORY:  H/O inguinal hernia repair 2019    H/O:      S/P craniotomy 2019  revision of monisha 10/19    [place] teeth extracted 2014

## 2024-01-31 NOTE — ED ADULT NURSE NOTE - NURSING NEURO ORIENTATION
-- DO NOT REPLY / DO NOT REPLY ALL --  -- Message is from the [facility]--    General Patient Message      Reason for Call: Patient missed call from [facility] and would like a call back, Patient did not disclose the reason.     Caller Information       Type Contact Phone    04/26/2022 06:06 PM CDT Phone (Incoming) Adilene Hanks (Self) [phone] (H)    04/26/2022 06:10 PM CDT Phone (Incoming) Adilene Hanks (Self) [phone] (M)          Alternative phone number: n/a        Did the caller agree that this message can wait until the office reopens in the morning? YES - The Message Can Wait      Send a message to the provider’s clinical support pool.     Turnaround time given to caller:   \"This message will be sent to [state Provider's name]. The clinical team will fulfill your request as soon as they review your message when the office opens tomorrow.\"         oriented to person, place and time

## 2024-01-31 NOTE — ED ADULT NURSE NOTE - NSFALLUNIVINTERV_ED_ALL_ED
Bed/Stretcher in lowest position, wheels locked, appropriate side rails in place/Call bell, personal items and telephone in reach/Instruct patient to call for assistance before getting out of bed/chair/stretcher/Non-slip footwear applied when patient is off stretcher/[place] to call system/Physically safe environment - no spills, clutter or unnecessary equipment/Purposeful proactive rounding/Room/bathroom lighting operational, light cord in reach

## 2024-01-31 NOTE — ED ADULT NURSE NOTE - OBJECTIVE STATEMENT
38 year old female, AXOX4, with a PMH of a ,am 38 year old female, AXOX4, with a PMH of a craniotomy 3/2019 and concussion 12/2023, presents to the ED complaining of right cheek numbness and heaviness that resolved within mins. Pt presents with intact neuro, clear speech, no facial droop, no numbness/tingling, no arm drift. Pt resting comfortably on the stretcher, breathing adequately on room air, speaking in complete sentences, strong strength in all extremities, sensations intact, abd soft non tender non distended. Denies dizziness, lethargy, N/V, chest pain, abd pain or dyspnea. Pt placed in gown on stretcher. Safety and comfort maintained. [place] provided. Bed in the lowest position.

## 2024-02-05 ENCOUNTER — APPOINTMENT (OUTPATIENT)
Dept: NEUROLOGY | Facility: CLINIC | Age: 39
End: 2024-02-05
Payer: COMMERCIAL

## 2024-02-05 VITALS
SYSTOLIC BLOOD PRESSURE: 110 MMHG | HEIGHT: 60 IN | HEART RATE: 79 BPM | WEIGHT: 139 LBS | DIASTOLIC BLOOD PRESSURE: 73 MMHG | BODY MASS INDEX: 27.29 KG/M2

## 2024-02-05 PROCEDURE — 99205 OFFICE O/P NEW HI 60 MIN: CPT

## 2024-02-05 NOTE — PHYSICAL EXAM
[FreeTextEntry1] : General physical examination: The patient is well-appearing. VS are stable There is no tenderness over the scalp or neck and no bruits over the eyes or at the neck. There is no proptosis, lid swelling, conjunctival injection, or chemosis. Cardiac exam shows a regular rate and no murmur. Neurologic examination: Mental status: The patient is alert, attentive, and oriented. Speech is clear and fluent with good repetition, comprehension, and naming. Recalls 3/3 objects at 5 minutes. Cranial nerves: CN II: Visual fields are full to confrontation. Pupils are 4 mm and briskly reactive to light. bilaterally. CN III, IV, VI: At primary gaze, there is no eye deviation.EOMI. No ptosis CN V: Facial sensation is intact bilaterally.  CN VII: Face is symmetric with normal eye closure and smile. CN VII: Hearing is normal to rubbing fingers CN IX, X: Palate elevates symmetrically. Phonation is normal. CN XI: Head turning and shoulder shrug are intact CN XII: Tongue is midline with normal movements and no atrophy. Motor: There is no pronator drift of out-stretched arms. Muscle bulk and tone are normal. Strength is full bilaterally. 	Deltoid	Biceps	Triceps	Wrist extension	Finger abduction	Hip flexion	Hip extension	Knee flexion	Knee extension	Ankle flexion	Ankle extension	 L	5	5	5	5	5	5	5	5	5	5	5	 R	5	5	5	5	5	5	5	5	5	5	5	 Sensory: Light touch intact in fingers and toes. Coordination: Rapid alternating movements and fine finger movements are intact. There is no dysmetria on finger-to-nose and heel-knee-shin. There are no abnormal or extraneous movements. Romberg is absent. Gait/Stance: Posture is normal. Gait is steady with normal steps, base, arm swing, and turning. Heel and toe walking are normal. Tandem gait is normal

## 2024-02-05 NOTE — ASSESSMENT
[FreeTextEntry1] : reviewed hospital records\par due to history of NSVT, advised cardio follow up \par advised to increase fluid intake, rest, and acetaminophen/ibuprofen prn pain/fever\par follow up in office if sx persists or worsens\par patient verbalizes understanding and is stable upon d/c\par 
Vaccine status unknown

## 2024-02-05 NOTE — HISTORY OF PRESENT ILLNESS
[FreeTextEntry1] : Ms. Gerardo is a 38-year-old right-handed female PMHx meningioma resection (4.5 years ago) who presents today for post hospitalization follow up after a right cheek numbness on 1/30/2024.  On 1/30/24 she presents to the ER for numbness. states this evening she was driving to  her son and while she was filling her car up with gas she developed right cheek numbness and possible tongue stiffness lasting a few seconds to 1 minute and then resolved. She went to urgent care and was advised to go to the ER. states she is back to her baseline at this time. denies f/n/v/d, CP, SOB, HA, dizziness, abdominal pain, urinary symptoms, cough, neck pain.  CT head: No acute intracranial hemorrhage, mass effect, or midline shift. Remote postoperative changes  2/5/2024 Today she reports feeling well. She had noticed the right cheek numbness and then started assessing herself for stroke. Thats when she noticed her tongue felt "stiff" and she started to get nervous. She does endorse being stressed with two young kids and going thru a divorce. Denied any other associated symptoms such as headache but did have heart palpations. She also endorses that for years she has BL hand numbness at times, that occurs when waking up and intermittently throughout the day.

## 2024-02-05 NOTE — DISCUSSION/SUMMARY
[FreeTextEntry1] : On 1/30/2024 She developed right facial numbness followed by possible tongue stiffness lasting a few seconds. She went to ED where workup was negative. Etiology is less likely to be neurovascular in nature but should be screened for with MRI. She also endorses intermittent BL hand numbness throughout the day which could be cervical pathology.   Overall patient is neurologically stable. I have ordered MRI head w/wo and cervical spine. Will refer to general neurology for evaluation of BL hand numbness. Screened patient for sleep apnea with no identifiable risk factors at this time. Will reevaluate next visit. We discussed aggressive vascular strict risk factor control.  Need for follow up to be determined after imaging. Patient and family were educated on signs and symptoms of stroke and to contact 911 immediately if experiencing any.   [Patient encouraged to discuss with Primary MD] : I encouraged the patient to discuss these important issues with ~his/her~ primary care doctor [Goals and Counseling] : I have reviewed the goals of stroke risk factor modification. I counseled the patient on measures to reduce stroke risk, including the importance of medication compliance, risk factor control, exercise, healthy diet and avoidance of smoking. I reviewed stroke warning signs and symptoms and appropriate actions to take if such occur.

## 2024-02-11 ENCOUNTER — NON-APPOINTMENT (OUTPATIENT)
Age: 39
End: 2024-02-11

## 2024-02-15 ENCOUNTER — APPOINTMENT (OUTPATIENT)
Dept: OBGYN | Facility: CLINIC | Age: 39
End: 2024-02-15
Payer: COMMERCIAL

## 2024-02-15 PROCEDURE — 36415 COLL VENOUS BLD VENIPUNCTURE: CPT

## 2024-02-15 PROCEDURE — 99395 PREV VISIT EST AGE 18-39: CPT

## 2024-02-26 ENCOUNTER — APPOINTMENT (OUTPATIENT)
Dept: NEUROLOGY | Facility: CLINIC | Age: 39
End: 2024-02-26
Payer: COMMERCIAL

## 2024-02-26 VITALS
SYSTOLIC BLOOD PRESSURE: 97 MMHG | HEART RATE: 65 BPM | WEIGHT: 140 LBS | DIASTOLIC BLOOD PRESSURE: 62 MMHG | BODY MASS INDEX: 27.48 KG/M2 | HEIGHT: 60 IN

## 2024-02-26 PROCEDURE — 99214 OFFICE O/P EST MOD 30 MIN: CPT

## 2024-02-26 PROCEDURE — 99204 OFFICE O/P NEW MOD 45 MIN: CPT

## 2024-02-26 NOTE — ASSESSMENT
[FreeTextEntry1] : This is a case of a 38 yr right handed female with PMH of asthma, tachycardia, meningioma resection (4.5 years ago) who presents today with b/l hand tingling.   impression: mild CTS Will send pt for EMG   Plan:    {    } EMG    {    } b/l hand brace at night     The above plan was discussed with Lesly Foley in great detail. Lesly Foley verbalized understanding and agrees with plan as detailed above. Patient was provided education and counselling on current diagnosis/symptoms. She was advised to call our clinic at [phone] for any new or worsening symptoms, or with any questions or concerns. In case of acute onset of neurological symptoms or worsening presentation, patient was advised to present to nearest emergency room for further evaluation. Lesly Alaina expressed understanding and all her questions/concerns were addressed.

## 2024-02-26 NOTE — REVIEW OF SYSTEMS
[Tingling] : tingling [Lightheadedness] : lightheadedness [Difficulty Walking] : difficulty walking [Inability to Walk] : inability to walk [Negative] : Integumentary

## 2024-02-26 NOTE — PHYSICAL EXAM
[FreeTextEntry1] : Physical Exam  Constitutional: Patient was well-developed, well-nourished and in no acute distress.   Head: Normocephalic, atraumatic.   Neck: Supple with full range of motion.   Cardiovascular: Cardiac rhythm was regular without murmur. There were no carotid bruits. Peripheral pulses were full and symmetric.   Respiratory: Lungs were clear.   Abdomen: Soft and nontender.   Spine: Nontender.   Skin: There were no rashes.   NEUROLOGICAL EXAMINATION:  Mental Status: Patient was alert and oriented. Speech was fluent. There was no dysarthria. Affect was normal.  Cranial Nerves:   II:  Pupils were equal and reactive. Visual fields were full.   III, IV, VI: Eye movements were full without nystagmus.   V: Facial sensation was intact.   VII: Facial strength was normal.   VIII: Hearing was equal.   IX, X: Palatal movement was normal. Phonation was normal.   XI: Sternocleidomastoids and trapezii were normal.   XII: Tongue was midline and movements normal. There was no lingual atrophy or fasciculations.   Motor Examination: Muscle bulk, tone and strength were normal.   Reflexes: DTRs were 2+ throughout.   Gait/Stance: Gait and tandem were normal. Romberg was negative.  [place]- unremarkable [place]- unremarkable

## 2024-02-26 NOTE — REVIEW OF SYSTEMS
[Tingling] : tingling [Difficulty Walking] : difficulty walking [Lightheadedness] : lightheadedness [Inability to Walk] : inability to walk [Negative] : Integumentary

## 2024-02-26 NOTE — ASSESSMENT
[FreeTextEntry1] : This is a case of a 38 yr right handed female with PMH of asthma, tachycardia, meningioma resection (4.5 years ago) who presents today with b/l hand tingling.   impression: mild CTS Will send pt for EMG   Plan:    {    } EMG    {    } b/l hand brace at night     The above plan was discussed with Lesly Foley in great detail. Lesly Foley verbalized understanding and agrees with plan as detailed above. Patient was provided education and counselling on current diagnosis/symptoms. She was advised to call our clinic at [phone] for any new or worsening symptoms, or with any questions or concerns. In case of acute onset of neurological symptoms or worsening presentation, patient was advised to present to nearest emergency room for further evaluation. Lesly Alaina expressed understanding and all her questions/concerns were addressed.

## 2024-02-28 NOTE — HISTORY OF PRESENT ILLNESS
[FreeTextEntry1] : This is a case of a 38 yr ___ handed female with PMH of meningioma resection (4.5 years ago) who presents today for post hospitalization follow up after a right cheek numbness on 1/30/2024.presents today with   Pt has had a history of headaches since  Past medication: Current medication: Occupation: Caffeine: Water: Exercise:  sleep:   Location: Description: Associated symptoms: Triggers:  Comorbidities: None Other pain conditions:  Imaging:  Interventions:  Family history:  Allergies: NKA   Symptoms: headache

## 2024-03-21 ENCOUNTER — APPOINTMENT (OUTPATIENT)
Dept: FAMILY MEDICINE | Facility: CLINIC | Age: 39
End: 2024-03-21
Payer: COMMERCIAL

## 2024-03-21 VITALS
HEART RATE: 85 BPM | DIASTOLIC BLOOD PRESSURE: 70 MMHG | SYSTOLIC BLOOD PRESSURE: 114 MMHG | RESPIRATION RATE: 16 BRPM | TEMPERATURE: 97.2 F | OXYGEN SATURATION: 99 %

## 2024-03-21 PROCEDURE — 99214 OFFICE O/P EST MOD 30 MIN: CPT

## 2024-03-21 RX ORDER — FLUTICASONE PROPIONATE 50 UG/1
50 SPRAY, METERED NASAL TWICE DAILY
Qty: 1 | Refills: 1 | Status: ACTIVE | COMMUNITY
Start: 2024-03-21 | End: 1900-01-01

## 2024-03-22 ENCOUNTER — TRANSCRIPTION ENCOUNTER (OUTPATIENT)
Age: 39
End: 2024-03-22

## 2024-03-22 LAB
INFLUENZA A RESULT: NOT DETECTED
INFLUENZA B RESULT: NOT DETECTED
RESP SYN VIRUS RESULT: NOT DETECTED
SARS-COV-2 RESULT: NOT DETECTED

## 2024-03-24 NOTE — HISTORY OF PRESENT ILLNESS
[FreeTextEntry8] : c/o left ear pain  sx have been ongoing since 1 week symtoms include:  sore throat, left ear pain, pain both  denies any pain with swallowing  Sick contacts- kids  recent travel- none  self treatment- advil, saline  tested prior- did not test denies any other associated symptoms  denies any other complaints or concerns at this time

## 2024-03-24 NOTE — PHYSICAL EXAM
[Normal] : no posterior cervical lymphadenopathy and no anterior cervical lymphadenopathy [de-identified] : + maxillary sinus tenderness, post nasal drip

## 2024-03-24 NOTE — ASSESSMENT
07/27/23 9:18 AM     [employer] SmartForm used to document caregap status.     Shannan Short MA [FreeTextEntry1] : VSS RST negative  will follow up covid/flu/rsv pcr and throat culture  medication as prescribed, medication education done  advised to increase fluid intake, rest, and acetaminophen/ibuprofen prn pain/fever follow up in office if sx persists or worsens patient verbalizes understanding and is stable upon d/c

## 2024-03-28 LAB — BACTERIA THROAT CULT: NORMAL

## 2024-04-04 ENCOUNTER — APPOINTMENT (OUTPATIENT)
Dept: OBGYN | Facility: CLINIC | Age: 39
End: 2024-04-04
Payer: COMMERCIAL

## 2024-04-04 PROCEDURE — 99212 OFFICE O/P EST SF 10 MIN: CPT

## 2024-04-04 PROCEDURE — 99459 PELVIC EXAMINATION: CPT

## 2024-04-25 ENCOUNTER — APPOINTMENT (OUTPATIENT)
Dept: FAMILY MEDICINE | Facility: CLINIC | Age: 39
End: 2024-04-25
Payer: COMMERCIAL

## 2024-04-25 VITALS
TEMPERATURE: 98 F | OXYGEN SATURATION: 99 % | DIASTOLIC BLOOD PRESSURE: 62 MMHG | BODY MASS INDEX: 27.68 KG/M2 | WEIGHT: 141 LBS | SYSTOLIC BLOOD PRESSURE: 100 MMHG | HEART RATE: 80 BPM | HEIGHT: 60 IN

## 2024-04-25 PROCEDURE — 99214 OFFICE O/P EST MOD 30 MIN: CPT

## 2024-04-25 PROCEDURE — 87880 STREP A ASSAY W/OPTIC: CPT | Mod: QW

## 2024-04-25 RX ORDER — ALPRAZOLAM 0.25 MG/1
0.25 TABLET ORAL
Qty: 10 | Refills: 0 | Status: ACTIVE | COMMUNITY
Start: 2024-04-25 | End: 1900-01-01

## 2024-04-25 NOTE — HEALTH RISK ASSESSMENT
[0] : 2) Feeling down, depressed, or hopeless: Not at all (0) [PHQ-2 Negative - No further assessment needed] : PHQ-2 Negative - No further assessment needed [[id number]] : 0

## 2024-04-25 NOTE — HISTORY OF PRESENT ILLNESS
[FreeTextEntry8] : 37 yo f, pmhx of meningioma, asthma, c/o sore throat  c/o sore throat and body aches and fatigue sx have been ongoing since friday  traveled to [place], returned on friday  symptoms include:  sore throat   denies any pain with swallowing  Sick contacts- son with ear infection last week while she was away  recent travel- [place]  self treatment- tylenol  tested prior- did not test denies any other associated symptoms  denies any other complaints or concerns at this time

## 2024-04-25 NOTE — PHYSICAL EXAM
[Normal] : affect was normal and insight and judgment were intact [de-identified] : + maxillary sinus tenderness, post nasal drip

## 2024-04-25 NOTE — ASSESSMENT
[FreeTextEntry1] : VSS RST negative  will follow up covid/flu/rsv pcr and throat culture  medication as prescribed, medication education done - did not take doxy from last- to start if symptoms persists  advised to increase fluid intake, rest, and acetaminophen/ibuprofen prn pain/fever follow up in office if sx persists or worsens patient verbalizes understanding and is stable upon d/c

## 2024-04-30 ENCOUNTER — APPOINTMENT (OUTPATIENT)
Dept: FAMILY MEDICINE | Facility: CLINIC | Age: 39
End: 2024-04-30
Payer: COMMERCIAL

## 2024-04-30 VITALS
HEIGHT: 60 IN | BODY MASS INDEX: 27.68 KG/M2 | HEART RATE: 85 BPM | DIASTOLIC BLOOD PRESSURE: 80 MMHG | WEIGHT: 141 LBS | TEMPERATURE: 96.9 F | SYSTOLIC BLOOD PRESSURE: 110 MMHG | RESPIRATION RATE: 16 BRPM | OXYGEN SATURATION: 99 %

## 2024-04-30 PROCEDURE — 99214 OFFICE O/P EST MOD 30 MIN: CPT

## 2024-05-05 LAB
BACTERIA THROAT CULT: NORMAL
INFLUENZA A RESULT: NOT DETECTED
INFLUENZA B RESULT: NOT DETECTED
RESP SYN VIRUS RESULT: NOT DETECTED
SARS-COV-2 RESULT: NOT DETECTED

## 2024-05-05 NOTE — HISTORY OF PRESENT ILLNESS
[FreeTextEntry8] : 37 yo f, pmhx of meningioma, asthma, c/o sore throat  c/o sore throat and body aches and fatigue sx have been ongoing since friday  traveled to [place], returned on friday  symptoms include:  sore throat   denies any pain with swallowing  Sick contacts- son with ear infection last week while she was away  recent travel- [place]  self treatment- tylenol  felt no different with antibiotic  tested prior- did not test denies any other associated symptoms  denies any other complaints or concerns at this time

## 2024-05-05 NOTE — REVIEW OF SYSTEMS
[Nasal Discharge] : nasal discharge [Postnasal Drip] : postnasal drip [Sore Throat] : sore throat [Negative] : Heme/Lymph

## 2024-05-05 NOTE — PHYSICAL EXAM
[de-identified] : + maxillary sinus tenderness, post nasal drip  [Normal] : soft, non-tender, non-distended, no masses palpated, no HSM and normal bowel sounds

## 2024-05-05 NOTE — ASSESSMENT
[FreeTextEntry1] : VSS rapid mono test positive   advised to increase fluid intake, rest, and acetaminophen/ibuprofen prn pain/fever follow up in office if sx persists or worsens patient verbalizes understanding and is stable upon d/c

## 2024-05-05 NOTE — HEALTH RISK ASSESSMENT
[0] : 2) Feeling down, depressed, or hopeless: Not at all (0) [PHQ-2 Negative - No further assessment needed] : PHQ-2 Negative - No further assessment needed [[id number]] : 0

## 2024-05-29 ENCOUNTER — APPOINTMENT (OUTPATIENT)
Dept: NEUROLOGY | Facility: CLINIC | Age: 39
End: 2024-05-29
Payer: COMMERCIAL

## 2024-05-29 ENCOUNTER — OUTPATIENT (OUTPATIENT)
Dept: OUTPATIENT SERVICES | Facility: HOSPITAL | Age: 39
LOS: 1 days | End: 2024-05-29
Payer: COMMERCIAL

## 2024-05-29 ENCOUNTER — APPOINTMENT (OUTPATIENT)
Dept: MRI IMAGING | Facility: CLINIC | Age: 39
End: 2024-05-29
Payer: COMMERCIAL

## 2024-05-29 DIAGNOSIS — Z15.01 GENETIC SUSCEPTIBILITY TO MALIGNANT NEOPLASM OF BREAST: ICD-10-CM

## 2024-05-29 DIAGNOSIS — K08.409 PARTIAL LOSS OF TEETH, UNSPECIFIED CAUSE, UNSPECIFIED CLASS: Chronic | ICD-10-CM

## 2024-05-29 DIAGNOSIS — Z00.8 ENCOUNTER FOR OTHER GENERAL EXAMINATION: ICD-10-CM

## 2024-05-29 DIAGNOSIS — Z98.89 OTHER SPECIFIED POSTPROCEDURAL STATES: Chronic | ICD-10-CM

## 2024-05-29 DIAGNOSIS — Z98.890 OTHER SPECIFIED POSTPROCEDURAL STATES: Chronic | ICD-10-CM

## 2024-05-29 PROCEDURE — 95886 MUSC TEST DONE W/N TEST COMP: CPT

## 2024-05-29 PROCEDURE — C8937: CPT

## 2024-05-29 PROCEDURE — 77049 MRI BREAST C-+ W/CAD BI: CPT | Mod: 26

## 2024-05-29 PROCEDURE — 95911 NRV CNDJ TEST 9-10 STUDIES: CPT

## 2024-05-29 PROCEDURE — C8908: CPT

## 2024-05-29 PROCEDURE — A9585: CPT

## 2024-06-14 ENCOUNTER — APPOINTMENT (OUTPATIENT)
Dept: FAMILY MEDICINE | Facility: CLINIC | Age: 39
End: 2024-06-14
Payer: COMMERCIAL

## 2024-06-14 VITALS
BODY MASS INDEX: 27.48 KG/M2 | HEART RATE: 73 BPM | OXYGEN SATURATION: 98 % | WEIGHT: 140 LBS | HEIGHT: 60 IN | TEMPERATURE: 97.8 F | DIASTOLIC BLOOD PRESSURE: 68 MMHG | RESPIRATION RATE: 16 BRPM | SYSTOLIC BLOOD PRESSURE: 114 MMHG

## 2024-06-14 DIAGNOSIS — J45.21 MILD INTERMITTENT ASTHMA WITH (ACUTE) EXACERBATION: ICD-10-CM

## 2024-06-14 DIAGNOSIS — R20.0 ANESTHESIA OF SKIN: ICD-10-CM

## 2024-06-14 DIAGNOSIS — J34.89 OTHER SPECIFIED DISORDERS OF NOSE AND NASAL SINUSES: ICD-10-CM

## 2024-06-14 DIAGNOSIS — R52 PAIN, UNSPECIFIED: ICD-10-CM

## 2024-06-14 DIAGNOSIS — Z87.898 PERSONAL HISTORY OF OTHER SPECIFIED CONDITIONS: ICD-10-CM

## 2024-06-14 DIAGNOSIS — H92.02 OTALGIA, LEFT EAR: ICD-10-CM

## 2024-06-14 DIAGNOSIS — Z87.09 PERSONAL HISTORY OF OTHER DISEASES OF THE RESPIRATORY SYSTEM: ICD-10-CM

## 2024-06-14 DIAGNOSIS — U07.1 COVID-19: ICD-10-CM

## 2024-06-14 DIAGNOSIS — R20.2 ANESTHESIA OF SKIN: ICD-10-CM

## 2024-06-14 DIAGNOSIS — R10.2 PELVIC AND PERINEAL PAIN: ICD-10-CM

## 2024-06-14 DIAGNOSIS — F41.9 ANXIETY DISORDER, UNSPECIFIED: ICD-10-CM

## 2024-06-14 DIAGNOSIS — K40.90 UNILATERAL INGUINAL HERNIA, W/OUT OBSTRUCTION OR GANGRENE, NOT SPECIFIED AS RECURRENT: ICD-10-CM

## 2024-06-14 DIAGNOSIS — R53.83 OTHER MALAISE: ICD-10-CM

## 2024-06-14 DIAGNOSIS — R50.9 FEVER, UNSPECIFIED: ICD-10-CM

## 2024-06-14 DIAGNOSIS — Z86.19 PERSONAL HISTORY OF OTHER INFECTIOUS AND PARASITIC DISEASES: ICD-10-CM

## 2024-06-14 DIAGNOSIS — Z00.00 ENCOUNTER FOR GENERAL ADULT MEDICAL EXAMINATION W/OUT ABNORMAL FINDINGS: ICD-10-CM

## 2024-06-14 DIAGNOSIS — R55 SYNCOPE AND COLLAPSE: ICD-10-CM

## 2024-06-14 DIAGNOSIS — Z23 ENCOUNTER FOR IMMUNIZATION: ICD-10-CM

## 2024-06-14 DIAGNOSIS — D32.9 BENIGN NEOPLASM OF MENINGES, UNSPECIFIED: ICD-10-CM

## 2024-06-14 DIAGNOSIS — J45.40 MODERATE PERSISTENT ASTHMA, UNCOMPLICATED: ICD-10-CM

## 2024-06-14 DIAGNOSIS — E83.19 OTHER DISORDERS OF IRON METABOLISM: ICD-10-CM

## 2024-06-14 DIAGNOSIS — R53.81 OTHER MALAISE: ICD-10-CM

## 2024-06-14 PROCEDURE — 99396 PREV VISIT EST AGE 40-64: CPT

## 2024-06-14 RX ORDER — DOXYCYCLINE 100 MG/1
100 CAPSULE ORAL
Qty: 14 | Refills: 0 | Status: COMPLETED | COMMUNITY
Start: 2023-04-10 | End: 2024-06-14

## 2024-06-19 NOTE — HISTORY OF PRESENT ILLNESS
[FreeTextEntry1] : CPE [de-identified] : 38 yo f, pmhx of meningioma, asthma, here for CPE overall is feeling well - did breast mri, being monitored by gyn  - lipoma of right forearm seeing derm pending sono  - broke 2nd toe last week  thought heard it crack  now better and able to walk tripped on the last stair - chronic right inguinal pain  hx of inguinal hernia surgery at 2019  seen by surgeon at the time  in the past 2-3 months, a pop burn sensation  possibly no mesh in the area  laying can feel a mild throbbing sensation was told by tech in 2020- ob  gyn = [facility] Dr. Bill  LAD on right sided  sept went to cardio, advised RLQ clips in area  denies any other associated symptoms  denies any other complaints or concerns at this time

## 2024-06-19 NOTE — HEALTH RISK ASSESSMENT
[Very Good] : ~his/her~  mood as very good [1 or 2 (0 pts)] : 1 or 2 (0 points) [Never (0 pts)] : Never (0 points) [No] : In the past 12 months have you used drugs other than those required for medical reasons? No [0] : 2) Feeling down, depressed, or hopeless: Not at all (0) [PHQ-2 Negative - No further assessment needed] : PHQ-2 Negative - No further assessment needed [Patient reported PAP Smear was normal] : Patient reported PAP Smear was normal [HIV test declined] : HIV test declined [Hepatitis C test declined] : Hepatitis C test declined [None] : None [With Family] : lives with family [Employed] : employed [Feels Safe at Home] : Feels safe at home [Fully functional (bathing, dressing, toileting, transferring, walking, feeding)] : Fully functional (bathing, dressing, toileting, transferring, walking, feeding) [Fully functional (using the telephone, shopping, preparing meals, housekeeping, doing laundry, using] : Fully functional and needs no help or supervision to perform IADLs (using the telephone, shopping, preparing meals, housekeeping, doing laundry, using transportation, managing medications and managing finances) [Never] : Never [Patient reported mammogram was normal] : Patient reported mammogram was normal [Patient reported colonoscopy was normal] : Patient reported colonoscopy was normal [# Of Children ___] : has [unfilled] children [Sexually Active] : sexually active [FreeTextEntry1] : ^ see above  [de-identified] : none [de-identified] : neuro sx- mri done in 12/2023, gyn, neuro  [Audit-CScore] : 0 [de-identified] : power walking [de-identified] : balanced; supplements- b and d occ  [[id number]] : 0 [Change in mental status noted] : No change in mental status noted [Language] : denies difficulty with language [High Risk Behavior] : no high risk behavior [Reports changes in hearing] : Reports no changes in hearing [Reports changes in vision] : Reports no changes in vision [Reports changes in dental health] : Reports no changes in dental health [MammogramDate] : 11/2023  [PapSmearDate] : 02/2024 [PapSmearComments] : Dr. Matt Bill  [ColonoscopyDate] : 09/2023 [de-identified] :  son, daughter [FreeTextEntry2] : [employer]  [de-identified] : copper IUD- 09/2023

## 2024-06-19 NOTE — ASSESSMENT
[FreeTextEntry1] : Routine medical examination VSS- exam normal  c/w current medications Advised healthy diet and exercise reviewed labs with patient  referred as above  patient verbalizes understanding and patient is stable upon discharge

## 2024-06-19 NOTE — PHYSICAL EXAM
[No Acute Distress] : no acute distress [Well Nourished] : well nourished [Well Developed] : well developed [Well-Appearing] : well-appearing [Normal Sclera/Conjunctiva] : normal sclera/conjunctiva [PERRL] : pupils equal round and reactive to light [EOMI] : extraocular movements intact [Normal Outer Ear/Nose] : the outer ears and nose were normal in appearance [Normal Oropharynx] : the oropharynx was normal [No JVD] : no jugular venous distention [No Lymphadenopathy] : no lymphadenopathy [Supple] : supple [Thyroid Normal, No Nodules] : the thyroid was normal and there were no nodules present [No Respiratory Distress] : no respiratory distress  [No Accessory Muscle Use] : no accessory muscle use [Clear to Auscultation] : lungs were clear to auscultation bilaterally [Normal Rate] : normal rate  [Regular Rhythm] : with a regular rhythm [Normal S1, S2] : normal S1 and S2 [No Murmur] : no murmur heard [No Carotid Bruits] : no carotid bruits [No Abdominal Bruit] : a ~M bruit was not heard ~T in the abdomen [No Varicosities] : no varicosities [Pedal Pulses Present] : the pedal pulses are present [No Edema] : there was no peripheral edema [No Palpable Aorta] : no palpable aorta [No Extremity Clubbing/Cyanosis] : no extremity clubbing/cyanosis [Soft] : abdomen soft [Non Tender] : non-tender [Non-distended] : non-distended [No Masses] : no abdominal mass palpated [No HSM] : no HSM [Normal Bowel Sounds] : normal bowel sounds [Normal Posterior Cervical Nodes] : no posterior cervical lymphadenopathy [Normal Anterior Cervical Nodes] : no anterior cervical lymphadenopathy [Normal Inguinal Nodes] : no inguinal lymphadenopathy [No CVA Tenderness] : no CVA  tenderness [No Spinal Tenderness] : no spinal tenderness [No Joint Swelling] : no joint swelling [Grossly Normal Strength/Tone] : grossly normal strength/tone [No Rash] : no rash [Coordination Grossly Intact] : coordination grossly intact [No Focal Deficits] : no focal deficits [Normal Gait] : normal gait [Deep Tendon Reflexes (DTR)] : deep tendon reflexes were 2+ and symmetric [Normal Affect] : the affect was normal [Normal Insight/Judgement] : insight and judgment were intact [de-identified] : no rebound or guarding, negative hanks's sign, negative mcburney's point

## 2024-06-25 ENCOUNTER — APPOINTMENT (OUTPATIENT)
Dept: ULTRASOUND IMAGING | Facility: CLINIC | Age: 39
End: 2024-06-25

## 2024-06-25 ENCOUNTER — OUTPATIENT (OUTPATIENT)
Dept: OUTPATIENT SERVICES | Facility: HOSPITAL | Age: 39
LOS: 1 days | End: 2024-06-25
Payer: COMMERCIAL

## 2024-06-25 DIAGNOSIS — Z98.89 OTHER SPECIFIED POSTPROCEDURAL STATES: Chronic | ICD-10-CM

## 2024-06-25 DIAGNOSIS — R22.31 LOCALIZED SWELLING, MASS AND LUMP, RIGHT UPPER LIMB: ICD-10-CM

## 2024-06-25 DIAGNOSIS — Z98.890 OTHER SPECIFIED POSTPROCEDURAL STATES: Chronic | ICD-10-CM

## 2024-06-25 DIAGNOSIS — K08.409 PARTIAL LOSS OF TEETH, UNSPECIFIED CAUSE, UNSPECIFIED CLASS: Chronic | ICD-10-CM

## 2024-06-25 PROCEDURE — 76882 US LMTD JT/FCL EVL NVASC XTR: CPT

## 2024-06-25 PROCEDURE — 76882 US LMTD JT/FCL EVL NVASC XTR: CPT | Mod: 26,RT

## 2024-08-12 ENCOUNTER — APPOINTMENT (OUTPATIENT)
Dept: ULTRASOUND IMAGING | Facility: CLINIC | Age: 39
End: 2024-08-12

## 2024-08-12 PROCEDURE — 76882 US LMTD JT/FCL EVL NVASC XTR: CPT | Mod: 26,RT

## 2024-08-13 DIAGNOSIS — R10.2 PELVIC AND PERINEAL PAIN: ICD-10-CM

## 2024-08-13 DIAGNOSIS — S30.851A SUPERFICIAL FOREIGN BODY OF ABDOMINAL WALL, INITIAL ENCOUNTER: ICD-10-CM

## 2024-08-23 ENCOUNTER — APPOINTMENT (OUTPATIENT)
Dept: FAMILY MEDICINE | Facility: CLINIC | Age: 39
End: 2024-08-23
Payer: COMMERCIAL

## 2024-08-23 VITALS
HEIGHT: 60 IN | HEART RATE: 80 BPM | TEMPERATURE: 97 F | OXYGEN SATURATION: 96 % | DIASTOLIC BLOOD PRESSURE: 68 MMHG | SYSTOLIC BLOOD PRESSURE: 120 MMHG | BODY MASS INDEX: 29.45 KG/M2 | WEIGHT: 150 LBS | RESPIRATION RATE: 16 BRPM

## 2024-08-23 DIAGNOSIS — E66.3 OVERWEIGHT: ICD-10-CM

## 2024-08-23 DIAGNOSIS — E28.2 POLYCYSTIC OVARIAN SYNDROME: ICD-10-CM

## 2024-08-23 DIAGNOSIS — J45.40 MODERATE PERSISTENT ASTHMA, UNCOMPLICATED: ICD-10-CM

## 2024-08-23 PROCEDURE — G2211 COMPLEX E/M VISIT ADD ON: CPT

## 2024-08-23 PROCEDURE — 99214 OFFICE O/P EST MOD 30 MIN: CPT

## 2024-08-23 RX ORDER — METFORMIN HYDROCHLORIDE 500 MG/1
500 TABLET, COATED ORAL DAILY
Qty: 30 | Refills: 2 | Status: ACTIVE | COMMUNITY
Start: 2024-08-23 | End: 1900-01-01

## 2024-08-25 PROBLEM — E66.3 OVERWEIGHT FOR HEIGHT: Status: ACTIVE | Noted: 2024-08-25

## 2024-08-25 NOTE — HISTORY OF PRESENT ILLNESS
[FreeTextEntry8] : 40 yo f, pmhx of meningioma, asthma, here for weight gain  noting gaining another 10 lbs since past weeks patient possibly has history of PCOS  seen by endo in the past  acnes and hair growth as a child  had a hard time with weight since when she was younger  short since she was younger  mother had CAD when she was younger  denies any other associated symptoms denies any other complaints or concerns at this time

## 2024-08-25 NOTE — ASSESSMENT
[FreeTextEntry1] : VSS  medication as prescribed, medication education done  trial of metformin BID medication as prescribed, medication education done  advised lifestyle modifications including, monitoring diet, weight loss, exercise  follow up in 3 months follow up in office if sx persists or worsens patient verbalizes understanding and is stable upon d/c

## 2024-08-25 NOTE — HISTORY OF PRESENT ILLNESS
[FreeTextEntry8] : 38 yo f, pmhx of meningioma, asthma, here for weight gain  noting gaining another 10 lbs since past weeks patient possibly has history of PCOS  seen by endo in the past  acnes and hair growth as a child  had a hard time with weight since when she was younger  short since she was younger  mother had CAD when she was younger  denies any other associated symptoms denies any other complaints or concerns at this time

## 2024-09-05 ENCOUNTER — APPOINTMENT (OUTPATIENT)
Dept: CT IMAGING | Facility: CLINIC | Age: 39
End: 2024-09-05

## 2024-09-11 ENCOUNTER — NON-APPOINTMENT (OUTPATIENT)
Age: 39
End: 2024-09-11

## 2024-09-13 ENCOUNTER — APPOINTMENT (OUTPATIENT)
Dept: FAMILY MEDICINE | Facility: CLINIC | Age: 39
End: 2024-09-13
Payer: COMMERCIAL

## 2024-09-13 VITALS
DIASTOLIC BLOOD PRESSURE: 70 MMHG | SYSTOLIC BLOOD PRESSURE: 110 MMHG | HEIGHT: 60 IN | RESPIRATION RATE: 16 BRPM | OXYGEN SATURATION: 96 % | TEMPERATURE: 98.1 F | BODY MASS INDEX: 29.45 KG/M2 | WEIGHT: 150 LBS | HEART RATE: 91 BPM

## 2024-09-13 DIAGNOSIS — R09.81 NASAL CONGESTION: ICD-10-CM

## 2024-09-13 DIAGNOSIS — R10.2 PELVIC AND PERINEAL PAIN: ICD-10-CM

## 2024-09-13 DIAGNOSIS — J20.9 ACUTE BRONCHITIS, UNSPECIFIED: ICD-10-CM

## 2024-09-13 DIAGNOSIS — R68.83 CHILLS (WITHOUT FEVER): ICD-10-CM

## 2024-09-13 DIAGNOSIS — J45.40 MODERATE PERSISTENT ASTHMA, UNCOMPLICATED: ICD-10-CM

## 2024-09-13 DIAGNOSIS — S99.922S UNSPECIFIED INJURY OF LEFT FOOT, SEQUELA: ICD-10-CM

## 2024-09-13 PROCEDURE — G2211 COMPLEX E/M VISIT ADD ON: CPT | Mod: NC

## 2024-09-13 PROCEDURE — 99214 OFFICE O/P EST MOD 30 MIN: CPT

## 2024-09-13 RX ORDER — FLUTICASONE PROPIONATE AND SALMETEROL 250; 50 UG/1; UG/1
250-50 POWDER RESPIRATORY (INHALATION)
Qty: 1 | Refills: 2 | Status: ACTIVE | COMMUNITY
Start: 2024-09-13 | End: 1900-01-01

## 2024-09-13 RX ORDER — FLUTICASONE PROPIONATE 50 UG/1
50 SPRAY, METERED NASAL TWICE DAILY
Qty: 1 | Refills: 1 | Status: ACTIVE | COMMUNITY
Start: 2024-09-13 | End: 1900-01-01

## 2024-09-13 NOTE — HISTORY OF PRESENT ILLNESS
[FreeTextEntry8] : 40 yo f, pmhx of meningioma, asthma, c/o cough  2 weeks ago chills and body aches  son was sick with cold 2 weeks ago  denies any fever but low grade  started to feel in chest yesterday, wheezing now using inhaler every 6 hours since prescribed yesterday  went to  yesterday, covid and strep- negative  + post nasal drip and nasal conegstion  given augmentin x 7 days which she has not started yet  denies any recent travel  self treatment- tylenol cold felt no different with antibiotic  denies any other associated symptoms   2) recurrent RLQ pain  imaging reordered  denied by insurance  possibly dropped from a hernia surgery  colonoscopy done in 09/2023  had a skin tag removed on the outside and since then has had blood on bowel movement on that side, one drop  denies any other associated symptoms   3) daughter dropped glass on sunday  possibly stepped on glass on tuesday  feels pain with pushing  denies any other associated symptoms  denies any other complaints or concerns at this time

## 2024-09-13 NOTE — PHYSICAL EXAM
[Normal] : no rash [de-identified] : + post nasal drip [de-identified] : + transmitted upper airway sounds, end expiratory wheezing

## 2024-09-13 NOTE — ASSESSMENT
[FreeTextEntry1] : VSS RSV panel pending  advised to increase fluid intake, rest, and acetaminophen/ibuprofen prn pain/fever trial of augmentin to be started if symptoms persists will reach out to surgeon regarding persistent pelvic discomfort and dropped clips as ct a/p has been denied  follow up in office if sx persists or worsens patient verbalizes understanding and is stable upon d/c

## 2024-09-13 NOTE — REVIEW OF SYSTEMS
[Chills] : chills [Postnasal Drip] : postnasal drip [Wheezing] : wheezing [Cough] : cough [Negative] : Musculoskeletal

## 2024-09-13 NOTE — HEALTH RISK ASSESSMENT
[0] : 2) Feeling down, depressed, or hopeless: Not at all (0) [PHQ-2 Negative - No further assessment needed] : PHQ-2 Negative - No further assessment needed [[id number]] : 0

## 2024-09-19 ENCOUNTER — TRANSCRIPTION ENCOUNTER (OUTPATIENT)
Age: 39
End: 2024-09-19

## 2024-09-25 ENCOUNTER — TRANSCRIPTION ENCOUNTER (OUTPATIENT)
Age: 39
End: 2024-09-25

## 2024-10-02 ENCOUNTER — APPOINTMENT (OUTPATIENT)
Dept: SURGERY | Facility: CLINIC | Age: 39
End: 2024-10-02
Payer: COMMERCIAL

## 2024-10-02 VITALS
WEIGHT: 145 LBS | BODY MASS INDEX: 28.47 KG/M2 | OXYGEN SATURATION: 99 % | RESPIRATION RATE: 16 BRPM | HEIGHT: 60 IN | TEMPERATURE: 96.5 F | SYSTOLIC BLOOD PRESSURE: 106 MMHG | HEART RATE: 69 BPM | DIASTOLIC BLOOD PRESSURE: 73 MMHG

## 2024-10-02 DIAGNOSIS — R10.2 PELVIC AND PERINEAL PAIN: ICD-10-CM

## 2024-10-02 PROCEDURE — 99203 OFFICE O/P NEW LOW 30 MIN: CPT

## 2024-10-02 NOTE — PLAN
[FreeTextEntry1] : I have no reason to believe that the aforementioned clips are responsible for this patient's chronic pain.  Her pain seems to be well above where the clips were placed.  I have asked her to obtain a CAT scan of the abdomen and pelvis to rule out a recurrent hernia.

## 2024-10-02 NOTE — HISTORY OF PRESENT ILLNESS
[de-identified] : Lesly is a 40 y/o female here for a consultation visit.  [de-identified] : This 39-year-old patient underwent an open right inguinal hernia repair approximately 5 years ago.  She had had it  section prior to that.  Sometime thereafter she developed pain in the right lower quadrant above the inguinal herniorrhaphy incision.  The pain is intermittent.  She denies any change in bowel or urinary habits.  She is tolerating a regular diet, having normal bowel movements, denies any weight loss or other constitutional issues.   She was told that a CAT scan performed in  revealed metal clips that were employed during the inguinal hernia repair and excision of a enlarged lymph node

## 2024-10-02 NOTE — CONSULT LETTER
[Dear  ___] : Dear  [unfilled], [Consult Letter:] : I had the pleasure of evaluating your patient, [unfilled]. [Please see my note below.] : Please see my note below. [Consult Closing:] : Thank you very much for allowing me to participate in the care of this patient.  If you have any questions, please do not hesitate to contact me. [Sincerely,] : Sincerely, [DrElisha  ___] : Dr. WILSON [FreeTextEntry3] : Juan Velez MD, FACS [facility] [address]  [place]  [address] Tel: [phone] Email: [email or web address]

## 2024-10-02 NOTE — HISTORY OF PRESENT ILLNESS
[de-identified] : Lesly is a 40 y/o female here for a consultation visit.  [de-identified] : This 39-year-old patient underwent an open right inguinal hernia repair approximately 5 years ago.  She had had it  section prior to that.  Sometime thereafter she developed pain in the right lower quadrant above the inguinal herniorrhaphy incision.  The pain is intermittent.  She denies any change in bowel or urinary habits.  She is tolerating a regular diet, having normal bowel movements, denies any weight loss or other constitutional issues.   She was told that a CAT scan performed in  revealed metal clips that were employed during the inguinal hernia repair and excision of a enlarged lymph node

## 2024-10-02 NOTE — PHYSICAL EXAM
[Normal Breath Sounds] : Normal breath sounds [Normal Heart Sounds] : normal heart sounds [No Rash or Lesion] : No rash or lesion [Calm] : calm [de-identified] : No distress [de-identified] : Sclera clear [de-identified] : Supple [de-identified] : Soft and nontender.  The right groin was examined and there is no indication for recurrent hernia.  There is no tenderness.  The wound is well-healed.  There is no tenderness below the inguinal ligament. [de-identified] : No clubbing cyanosis or edema [de-identified] : Cranial nerves II through XII grossly intact

## 2024-10-02 NOTE — PHYSICAL EXAM
[Normal Breath Sounds] : Normal breath sounds [Normal Heart Sounds] : normal heart sounds [No Rash or Lesion] : No rash or lesion [Calm] : calm [de-identified] : No distress [de-identified] : Sclera clear [de-identified] : Supple [de-identified] : Soft and nontender.  The right groin was examined and there is no indication for recurrent hernia.  There is no tenderness.  The wound is well-healed.  There is no tenderness below the inguinal ligament. [de-identified] : No clubbing cyanosis or edema [de-identified] : Cranial nerves II through XII grossly intact

## 2024-10-02 NOTE — CONSULT LETTER
[Dear  ___] : Dear  [unfilled], [Consult Letter:] : I had the pleasure of evaluating your patient, [unfilled]. [Please see my note below.] : Please see my note below. [Consult Closing:] : Thank you very much for allowing me to participate in the care of this patient.  If you have any questions, please do not hesitate to contact me. [Sincerely,] : Sincerely, [DrElisha  ___] : Dr. WILSON [FreeTextEntry3] : Juan Velez MD, FACS [facility] [address]  [place]  [address] Tel: [phone] Email: [email or web address]

## 2024-10-02 NOTE — DATA REVIEWED
[FreeTextEntry1] : The CAT scan dated June 28, 2023 revealed several metallic clips in the right lower quadrant/right groin.  No recurrent hernia or other abnormalities were seen

## 2024-10-02 NOTE — ASSESSMENT
[FreeTextEntry1] : This 39-year-old patient developed right lower quadrant pain sometime after her right inguinal hernia repair.

## 2024-10-08 ENCOUNTER — RESULT REVIEW (OUTPATIENT)
Age: 39
End: 2024-10-08

## 2024-10-11 ENCOUNTER — APPOINTMENT (OUTPATIENT)
Dept: CT IMAGING | Facility: IMAGING CENTER | Age: 39
End: 2024-10-11
Payer: MEDICARE

## 2024-10-11 ENCOUNTER — OUTPATIENT (OUTPATIENT)
Dept: OUTPATIENT SERVICES | Facility: HOSPITAL | Age: 39
LOS: 1 days | End: 2024-10-11
Payer: MEDICARE

## 2024-10-11 DIAGNOSIS — Z98.890 OTHER SPECIFIED POSTPROCEDURAL STATES: Chronic | ICD-10-CM

## 2024-10-11 DIAGNOSIS — Z00.8 ENCOUNTER FOR OTHER GENERAL EXAMINATION: ICD-10-CM

## 2024-10-11 DIAGNOSIS — R10.2 PELVIC AND PERINEAL PAIN: ICD-10-CM

## 2024-10-11 DIAGNOSIS — K08.409 PARTIAL LOSS OF TEETH, UNSPECIFIED CAUSE, UNSPECIFIED CLASS: Chronic | ICD-10-CM

## 2024-10-11 PROCEDURE — 74177 CT ABD & PELVIS W/CONTRAST: CPT | Mod: 26

## 2024-10-11 PROCEDURE — 74177 CT ABD & PELVIS W/CONTRAST: CPT

## 2024-10-21 ENCOUNTER — NON-APPOINTMENT (OUTPATIENT)
Age: 39
End: 2024-10-21

## 2024-10-25 ENCOUNTER — NON-APPOINTMENT (OUTPATIENT)
Age: 39
End: 2024-10-25

## 2024-10-28 ENCOUNTER — APPOINTMENT (OUTPATIENT)
Dept: FAMILY MEDICINE | Facility: CLINIC | Age: 39
End: 2024-10-28
Payer: COMMERCIAL

## 2024-10-28 VITALS
HEIGHT: 60 IN | HEART RATE: 90 BPM | OXYGEN SATURATION: 99 % | RESPIRATION RATE: 16 BRPM | DIASTOLIC BLOOD PRESSURE: 74 MMHG | SYSTOLIC BLOOD PRESSURE: 114 MMHG | TEMPERATURE: 97.3 F

## 2024-10-28 DIAGNOSIS — R10.2 PELVIC AND PERINEAL PAIN: ICD-10-CM

## 2024-10-28 DIAGNOSIS — R59.0 LOCALIZED ENLARGED LYMPH NODES: ICD-10-CM

## 2024-10-28 PROBLEM — J18.9 LEFT LOWER LOBE PNEUMONIA: Status: ACTIVE | Noted: 2024-10-28

## 2024-10-28 PROCEDURE — G2211 COMPLEX E/M VISIT ADD ON: CPT | Mod: NC

## 2024-10-28 PROCEDURE — 99214 OFFICE O/P EST MOD 30 MIN: CPT

## 2024-11-04 ENCOUNTER — APPOINTMENT (OUTPATIENT)
Dept: RADIOLOGY | Facility: CLINIC | Age: 39
End: 2024-11-04
Payer: COMMERCIAL

## 2024-11-04 ENCOUNTER — APPOINTMENT (OUTPATIENT)
Dept: FAMILY MEDICINE | Facility: CLINIC | Age: 39
End: 2024-11-04
Payer: COMMERCIAL

## 2024-11-04 VITALS
SYSTOLIC BLOOD PRESSURE: 114 MMHG | HEIGHT: 60 IN | HEART RATE: 94 BPM | RESPIRATION RATE: 16 BRPM | TEMPERATURE: 97.5 F | OXYGEN SATURATION: 96 % | DIASTOLIC BLOOD PRESSURE: 68 MMHG

## 2024-11-04 DIAGNOSIS — J18.9 PNEUMONIA, UNSPECIFIED ORGANISM: ICD-10-CM

## 2024-11-04 DIAGNOSIS — R09.81 NASAL CONGESTION: ICD-10-CM

## 2024-11-04 DIAGNOSIS — F41.9 ANXIETY DISORDER, UNSPECIFIED: ICD-10-CM

## 2024-11-04 DIAGNOSIS — E28.2 POLYCYSTIC OVARIAN SYNDROME: ICD-10-CM

## 2024-11-04 PROCEDURE — 71046 X-RAY EXAM CHEST 2 VIEWS: CPT

## 2024-11-04 PROCEDURE — 99214 OFFICE O/P EST MOD 30 MIN: CPT

## 2024-11-04 PROCEDURE — G2211 COMPLEX E/M VISIT ADD ON: CPT | Mod: NC

## 2024-11-04 RX ORDER — PROMETHAZINE HYDROCHLORIDE AND DEXTROMETHORPHAN HYDROBROMIDE ORAL SOLUTION 15; 6.25 MG/5ML; MG/5ML
6.25-15 SOLUTION ORAL
Qty: 1 | Refills: 0 | Status: ACTIVE | COMMUNITY
Start: 2024-11-04 | End: 1900-01-01

## 2024-11-05 DIAGNOSIS — R05.8 OTHER SPECIFIED COUGH: ICD-10-CM

## 2024-11-05 DIAGNOSIS — J18.9 PNEUMONIA, UNSPECIFIED ORGANISM: ICD-10-CM

## 2024-11-05 DIAGNOSIS — J45.40 MODERATE PERSISTENT ASTHMA, UNCOMPLICATED: ICD-10-CM

## 2024-11-05 RX ORDER — PREDNISONE 10 MG/1
10 TABLET ORAL
Qty: 12 | Refills: 0 | Status: ACTIVE | COMMUNITY
Start: 2024-11-05 | End: 1900-01-01

## 2024-11-18 ENCOUNTER — APPOINTMENT (OUTPATIENT)
Dept: SURGICAL ONCOLOGY | Facility: CLINIC | Age: 39
End: 2024-11-18

## 2024-11-18 ENCOUNTER — NON-APPOINTMENT (OUTPATIENT)
Age: 39
End: 2024-11-18

## 2024-11-18 VITALS
RESPIRATION RATE: 17 BRPM | DIASTOLIC BLOOD PRESSURE: 83 MMHG | HEIGHT: 60 IN | SYSTOLIC BLOOD PRESSURE: 123 MMHG | OXYGEN SATURATION: 98 % | HEART RATE: 68 BPM | WEIGHT: 144 LBS | BODY MASS INDEX: 28.27 KG/M2

## 2024-11-18 DIAGNOSIS — R59.0 LOCALIZED ENLARGED LYMPH NODES: ICD-10-CM

## 2024-11-18 PROCEDURE — 99215 OFFICE O/P EST HI 40 MIN: CPT

## 2024-11-19 DIAGNOSIS — J18.9 PNEUMONIA, UNSPECIFIED ORGANISM: ICD-10-CM

## 2024-11-20 ENCOUNTER — APPOINTMENT (OUTPATIENT)
Dept: RADIOLOGY | Facility: IMAGING CENTER | Age: 39
End: 2024-11-20
Payer: MEDICARE

## 2024-11-20 ENCOUNTER — APPOINTMENT (OUTPATIENT)
Dept: ULTRASOUND IMAGING | Facility: IMAGING CENTER | Age: 39
End: 2024-11-20
Payer: MEDICARE

## 2024-11-20 ENCOUNTER — OUTPATIENT (OUTPATIENT)
Dept: OUTPATIENT SERVICES | Facility: HOSPITAL | Age: 39
LOS: 1 days | End: 2024-11-20
Payer: MEDICARE

## 2024-11-20 DIAGNOSIS — Z98.89 OTHER SPECIFIED POSTPROCEDURAL STATES: Chronic | ICD-10-CM

## 2024-11-20 DIAGNOSIS — Z00.8 ENCOUNTER FOR OTHER GENERAL EXAMINATION: ICD-10-CM

## 2024-11-20 PROCEDURE — 76882 US LMTD JT/FCL EVL NVASC XTR: CPT | Mod: 26,RT

## 2024-11-20 PROCEDURE — 76882 US LMTD JT/FCL EVL NVASC XTR: CPT

## 2024-11-20 PROCEDURE — 71046 X-RAY EXAM CHEST 2 VIEWS: CPT

## 2024-11-20 PROCEDURE — 71046 X-RAY EXAM CHEST 2 VIEWS: CPT | Mod: 26

## 2024-12-02 ENCOUNTER — OUTPATIENT (OUTPATIENT)
Dept: OUTPATIENT SERVICES | Facility: HOSPITAL | Age: 39
LOS: 1 days | End: 2024-12-02
Payer: MEDICARE

## 2024-12-02 ENCOUNTER — APPOINTMENT (OUTPATIENT)
Dept: MAMMOGRAPHY | Facility: CLINIC | Age: 39
End: 2024-12-02
Payer: COMMERCIAL

## 2024-12-02 ENCOUNTER — APPOINTMENT (OUTPATIENT)
Dept: ULTRASOUND IMAGING | Facility: CLINIC | Age: 39
End: 2024-12-02
Payer: COMMERCIAL

## 2024-12-02 DIAGNOSIS — Z00.8 ENCOUNTER FOR OTHER GENERAL EXAMINATION: ICD-10-CM

## 2024-12-02 DIAGNOSIS — Z98.890 OTHER SPECIFIED POSTPROCEDURAL STATES: Chronic | ICD-10-CM

## 2024-12-02 PROCEDURE — 76641 ULTRASOUND BREAST COMPLETE: CPT

## 2024-12-02 PROCEDURE — 77063 BREAST TOMOSYNTHESIS BI: CPT | Mod: 26

## 2024-12-02 PROCEDURE — 76641 ULTRASOUND BREAST COMPLETE: CPT | Mod: 26,50

## 2024-12-02 PROCEDURE — 77067 SCR MAMMO BI INCL CAD: CPT | Mod: 26

## 2024-12-02 PROCEDURE — 77063 BREAST TOMOSYNTHESIS BI: CPT

## 2024-12-02 PROCEDURE — 77067 SCR MAMMO BI INCL CAD: CPT

## 2024-12-10 ENCOUNTER — APPOINTMENT (OUTPATIENT)
Dept: MRI IMAGING | Facility: CLINIC | Age: 39
End: 2024-12-10

## 2024-12-26 ENCOUNTER — NON-APPOINTMENT (OUTPATIENT)
Age: 39
End: 2024-12-26

## 2025-01-06 ENCOUNTER — APPOINTMENT (OUTPATIENT)
Dept: OBGYN | Facility: CLINIC | Age: 40
End: 2025-01-06
Payer: COMMERCIAL

## 2025-01-06 PROCEDURE — 99459 PELVIC EXAMINATION: CPT

## 2025-01-06 PROCEDURE — 96127 BRIEF EMOTIONAL/BEHAV ASSMT: CPT

## 2025-01-06 PROCEDURE — 99396 PREV VISIT EST AGE 40-64: CPT

## 2025-01-09 DIAGNOSIS — R21 RASH AND OTHER NONSPECIFIC SKIN ERUPTION: ICD-10-CM

## 2025-01-09 RX ORDER — MUPIROCIN 20 MG/G
2 OINTMENT TOPICAL TWICE DAILY
Qty: 1 | Refills: 0 | Status: ACTIVE | COMMUNITY
Start: 2025-01-09 | End: 1900-01-01

## 2025-01-12 PROBLEM — R39.9 UTI SYMPTOMS: Status: ACTIVE | Noted: 2025-01-12

## 2025-01-21 ENCOUNTER — APPOINTMENT (OUTPATIENT)
Dept: FAMILY MEDICINE | Facility: CLINIC | Age: 40
End: 2025-01-21
Payer: COMMERCIAL

## 2025-01-21 VITALS
OXYGEN SATURATION: 99 % | HEIGHT: 60 IN | DIASTOLIC BLOOD PRESSURE: 74 MMHG | SYSTOLIC BLOOD PRESSURE: 118 MMHG | BODY MASS INDEX: 28.86 KG/M2 | WEIGHT: 147 LBS | TEMPERATURE: 95.6 F | RESPIRATION RATE: 16 BRPM | HEART RATE: 84 BPM

## 2025-01-21 DIAGNOSIS — J45.40 MODERATE PERSISTENT ASTHMA, UNCOMPLICATED: ICD-10-CM

## 2025-01-21 DIAGNOSIS — J01.00 ACUTE MAXILLARY SINUSITIS, UNSPECIFIED: ICD-10-CM

## 2025-01-21 DIAGNOSIS — R09.82 POSTNASAL DRIP: ICD-10-CM

## 2025-01-21 DIAGNOSIS — J02.9 ACUTE PHARYNGITIS, UNSPECIFIED: ICD-10-CM

## 2025-01-21 PROCEDURE — 99214 OFFICE O/P EST MOD 30 MIN: CPT

## 2025-01-21 PROCEDURE — G2211 COMPLEX E/M VISIT ADD ON: CPT | Mod: NC

## 2025-01-22 DIAGNOSIS — E66.3 OVERWEIGHT: ICD-10-CM

## 2025-01-24 LAB — BACTERIA THROAT CULT: NORMAL

## 2025-01-27 DIAGNOSIS — R39.9 UNSPECIFIED SYMPTOMS AND SIGNS INVOLVING THE GENITOURINARY SYSTEM: ICD-10-CM

## 2025-01-28 ENCOUNTER — APPOINTMENT (OUTPATIENT)
Dept: FAMILY MEDICINE | Facility: CLINIC | Age: 40
End: 2025-01-28
Payer: COMMERCIAL

## 2025-01-28 DIAGNOSIS — Z20.828 CONTACT WITH AND (SUSPECTED) EXPOSURE TO OTHER VIRAL COMMUNICABLE DISEASES: ICD-10-CM

## 2025-01-28 DIAGNOSIS — J01.00 ACUTE MAXILLARY SINUSITIS, UNSPECIFIED: ICD-10-CM

## 2025-01-28 PROCEDURE — 99213 OFFICE O/P EST LOW 20 MIN: CPT | Mod: 95

## 2025-01-28 RX ORDER — AMOXICILLIN AND CLAVULANATE POTASSIUM 875; 125 MG/1; MG/1
875-125 TABLET, COATED ORAL
Qty: 14 | Refills: 0 | Status: COMPLETED | COMMUNITY
Start: 2025-01-21 | End: 2025-01-28

## 2025-01-28 RX ORDER — BALOXAVIR MARBOXIL 40 MG/1
1 X 40 TABLET, FILM COATED ORAL
Qty: 1 | Refills: 0 | Status: ACTIVE | COMMUNITY
Start: 2025-01-28 | End: 1900-01-01

## 2025-02-02 ENCOUNTER — NON-APPOINTMENT (OUTPATIENT)
Age: 40
End: 2025-02-02

## 2025-02-05 ENCOUNTER — NON-APPOINTMENT (OUTPATIENT)
Age: 40
End: 2025-02-05

## 2025-02-20 ENCOUNTER — NON-APPOINTMENT (OUTPATIENT)
Age: 40
End: 2025-02-20

## 2025-03-26 ENCOUNTER — NON-APPOINTMENT (OUTPATIENT)
Age: 40
End: 2025-03-26

## 2025-04-10 ENCOUNTER — NON-APPOINTMENT (OUTPATIENT)
Age: 40
End: 2025-04-10

## 2025-05-14 ENCOUNTER — TRANSCRIPTION ENCOUNTER (OUTPATIENT)
Age: 40
End: 2025-05-14

## 2025-05-19 ENCOUNTER — APPOINTMENT (OUTPATIENT)
Dept: SURGICAL ONCOLOGY | Facility: CLINIC | Age: 40
End: 2025-05-19
Payer: COMMERCIAL

## 2025-05-19 VITALS
HEIGHT: 60 IN | SYSTOLIC BLOOD PRESSURE: 119 MMHG | HEART RATE: 70 BPM | WEIGHT: 146 LBS | DIASTOLIC BLOOD PRESSURE: 76 MMHG | BODY MASS INDEX: 28.66 KG/M2 | OXYGEN SATURATION: 99 %

## 2025-05-19 DIAGNOSIS — R59.0 LOCALIZED ENLARGED LYMPH NODES: ICD-10-CM

## 2025-05-19 DIAGNOSIS — M79.89 OTHER SPECIFIED SOFT TISSUE DISORDERS: ICD-10-CM

## 2025-05-19 PROCEDURE — G2212 PROLONG OUTPT/OFFICE VIS: CPT

## 2025-05-19 PROCEDURE — 99417 PROLNG OP E/M EACH 15 MIN: CPT

## 2025-05-19 PROCEDURE — 99215 OFFICE O/P EST HI 40 MIN: CPT

## 2025-05-28 DIAGNOSIS — Z00.00 ENCOUNTER FOR GENERAL ADULT MEDICAL EXAMINATION W/OUT ABNORMAL FINDINGS: ICD-10-CM

## 2025-06-02 ENCOUNTER — APPOINTMENT (OUTPATIENT)
Dept: ULTRASOUND IMAGING | Facility: CLINIC | Age: 40
End: 2025-06-02
Payer: COMMERCIAL

## 2025-06-02 PROCEDURE — 76857 US EXAM PELVIC LIMITED: CPT

## 2025-06-05 ENCOUNTER — OUTPATIENT (OUTPATIENT)
Dept: OUTPATIENT SERVICES | Facility: HOSPITAL | Age: 40
LOS: 1 days | End: 2025-06-05
Payer: MEDICARE

## 2025-06-05 ENCOUNTER — APPOINTMENT (OUTPATIENT)
Dept: ULTRASOUND IMAGING | Facility: CLINIC | Age: 40
End: 2025-06-05

## 2025-06-05 DIAGNOSIS — R59.0 LOCALIZED ENLARGED LYMPH NODES: ICD-10-CM

## 2025-06-05 DIAGNOSIS — K08.409 PARTIAL LOSS OF TEETH, UNSPECIFIED CAUSE, UNSPECIFIED CLASS: Chronic | ICD-10-CM

## 2025-06-05 DIAGNOSIS — Z98.890 OTHER SPECIFIED POSTPROCEDURAL STATES: Chronic | ICD-10-CM

## 2025-06-05 DIAGNOSIS — Z98.89 OTHER SPECIFIED POSTPROCEDURAL STATES: Chronic | ICD-10-CM

## 2025-06-05 DIAGNOSIS — Z00.8 ENCOUNTER FOR OTHER GENERAL EXAMINATION: ICD-10-CM

## 2025-06-05 PROCEDURE — 76882 US LMTD JT/FCL EVL NVASC XTR: CPT

## 2025-06-05 PROCEDURE — 76882 US LMTD JT/FCL EVL NVASC XTR: CPT | Mod: 26,RT

## 2025-06-20 ENCOUNTER — APPOINTMENT (OUTPATIENT)
Dept: MRI IMAGING | Facility: IMAGING CENTER | Age: 40
End: 2025-06-20
Payer: COMMERCIAL

## 2025-06-20 ENCOUNTER — OUTPATIENT (OUTPATIENT)
Dept: OUTPATIENT SERVICES | Facility: HOSPITAL | Age: 40
LOS: 1 days | End: 2025-06-20
Payer: COMMERCIAL

## 2025-06-20 DIAGNOSIS — Z98.890 OTHER SPECIFIED POSTPROCEDURAL STATES: Chronic | ICD-10-CM

## 2025-06-20 DIAGNOSIS — D32.9 BENIGN NEOPLASM OF MENINGES, UNSPECIFIED: ICD-10-CM

## 2025-06-20 DIAGNOSIS — K08.409 PARTIAL LOSS OF TEETH, UNSPECIFIED CAUSE, UNSPECIFIED CLASS: Chronic | ICD-10-CM

## 2025-06-20 DIAGNOSIS — Z98.89 OTHER SPECIFIED POSTPROCEDURAL STATES: Chronic | ICD-10-CM

## 2025-06-20 PROCEDURE — 70551 MRI BRAIN STEM W/O DYE: CPT

## 2025-06-20 PROCEDURE — 70551 MRI BRAIN STEM W/O DYE: CPT | Mod: 26

## 2025-06-25 ENCOUNTER — APPOINTMENT (OUTPATIENT)
Dept: NEUROSURGERY | Facility: CLINIC | Age: 40
End: 2025-06-25
Payer: COMMERCIAL

## 2025-06-25 VITALS
BODY MASS INDEX: 28.66 KG/M2 | SYSTOLIC BLOOD PRESSURE: 118 MMHG | RESPIRATION RATE: 16 BRPM | DIASTOLIC BLOOD PRESSURE: 75 MMHG | HEIGHT: 60 IN | OXYGEN SATURATION: 98 % | TEMPERATURE: 98.2 F | HEART RATE: 72 BPM | WEIGHT: 146 LBS

## 2025-06-25 PROCEDURE — 99212 OFFICE O/P EST SF 10 MIN: CPT

## 2025-07-09 ENCOUNTER — APPOINTMENT (OUTPATIENT)
Dept: FAMILY MEDICINE | Facility: CLINIC | Age: 40
End: 2025-07-09
Payer: COMMERCIAL

## 2025-07-09 VITALS
BODY MASS INDEX: 28.66 KG/M2 | HEART RATE: 82 BPM | WEIGHT: 146 LBS | HEIGHT: 60 IN | SYSTOLIC BLOOD PRESSURE: 114 MMHG | TEMPERATURE: 98 F | DIASTOLIC BLOOD PRESSURE: 68 MMHG | RESPIRATION RATE: 16 BRPM | OXYGEN SATURATION: 99 %

## 2025-07-09 PROBLEM — Z87.898 HISTORY OF NASAL CONGESTION: Status: RESOLVED | Noted: 2024-09-13 | Resolved: 2025-07-09

## 2025-07-09 PROBLEM — R05.8 DRY COUGH: Status: RESOLVED | Noted: 2024-11-04 | Resolved: 2025-07-09

## 2025-07-09 PROBLEM — Z86.59 HISTORY OF ANXIETY: Status: RESOLVED | Noted: 2024-04-25 | Resolved: 2025-07-09

## 2025-07-09 PROBLEM — R39.9 UTI SYMPTOMS: Status: RESOLVED | Noted: 2025-01-12 | Resolved: 2025-07-09

## 2025-07-09 PROBLEM — R21 RASH OF BODY: Status: RESOLVED | Noted: 2025-01-09 | Resolved: 2025-07-09

## 2025-07-09 PROBLEM — Z87.01 HISTORY OF COMMUNITY ACQUIRED PNEUMONIA: Status: RESOLVED | Noted: 2024-11-05 | Resolved: 2025-07-09

## 2025-07-09 PROBLEM — Z87.898 HISTORY OF POSTNASAL DRIP: Status: RESOLVED | Noted: 2025-01-21 | Resolved: 2025-07-09

## 2025-07-09 PROBLEM — Z20.828 EXPOSURE TO THE FLU: Status: RESOLVED | Noted: 2025-01-28 | Resolved: 2025-07-09

## 2025-07-09 PROBLEM — R68.83 CHILLS: Status: RESOLVED | Noted: 2024-09-13 | Resolved: 2025-07-09

## 2025-07-09 PROBLEM — Z87.09 HISTORY OF SORE THROAT: Status: RESOLVED | Noted: 2025-01-21 | Resolved: 2025-07-09

## 2025-07-09 PROBLEM — R10.2 PELVIC PAIN IN FEMALE: Status: RESOLVED | Noted: 2023-01-10 | Resolved: 2025-07-09

## 2025-07-09 PROBLEM — M79.89 CALF SWELLING: Status: RESOLVED | Noted: 2025-05-19 | Resolved: 2025-07-09

## 2025-07-09 PROBLEM — Z82.0 FAMILY HISTORY OF PARKINSON'S DISEASE: Status: ACTIVE | Noted: 2025-07-09

## 2025-07-09 PROBLEM — S99.922S FOOT TRAUMA, LEFT, SEQUELA: Status: RESOLVED | Noted: 2024-09-13 | Resolved: 2025-07-09

## 2025-07-09 PROCEDURE — 99396 PREV VISIT EST AGE 40-64: CPT

## 2025-07-09 RX ORDER — ALBUTEROL SULFATE 90 UG/1
108 AEROSOL, METERED RESPIRATORY (INHALATION)
Refills: 0 | Status: ACTIVE | COMMUNITY

## 2025-08-17 ENCOUNTER — NON-APPOINTMENT (OUTPATIENT)
Age: 40
End: 2025-08-17